# Patient Record
Sex: FEMALE | Race: WHITE | NOT HISPANIC OR LATINO | Employment: OTHER | URBAN - METROPOLITAN AREA
[De-identification: names, ages, dates, MRNs, and addresses within clinical notes are randomized per-mention and may not be internally consistent; named-entity substitution may affect disease eponyms.]

---

## 2017-02-13 ENCOUNTER — GENERIC CONVERSION - ENCOUNTER (OUTPATIENT)
Dept: OTHER | Facility: OTHER | Age: 47
End: 2017-02-13

## 2017-02-13 DIAGNOSIS — Z79.01 LONG TERM CURRENT USE OF ANTICOAGULANT: ICD-10-CM

## 2017-02-13 DIAGNOSIS — Z12.31 ENCOUNTER FOR SCREENING MAMMOGRAM FOR MALIGNANT NEOPLASM OF BREAST: ICD-10-CM

## 2017-02-13 LAB
AMBIG ABBREV CMP14 DEFAULT (HISTORICAL): NORMAL
AMBIG ABBREV LP DEFAULT (HISTORICAL): NORMAL

## 2017-02-14 LAB
A/G RATIO (HISTORICAL): 1.4 (ref 1.1–2.5)
ALBUMIN SERPL BCP-MCNC: 4.3 G/DL (ref 3.5–5.5)
ALP SERPL-CCNC: 73 IU/L (ref 39–117)
ALT SERPL W P-5'-P-CCNC: 23 IU/L (ref 0–32)
AST SERPL W P-5'-P-CCNC: 21 IU/L (ref 0–40)
BILIRUB SERPL-MCNC: <0.2 MG/DL (ref 0–1.2)
BUN SERPL-MCNC: 14 MG/DL (ref 6–24)
BUN/CREA RATIO (HISTORICAL): 20 (ref 9–23)
CALCIUM SERPL-MCNC: 9.4 MG/DL (ref 8.7–10.2)
CHLORIDE SERPL-SCNC: 103 MMOL/L (ref 96–106)
CHOLEST SERPL-MCNC: 223 MG/DL (ref 100–199)
CO2 SERPL-SCNC: 22 MMOL/L (ref 18–29)
CREAT SERPL-MCNC: 0.69 MG/DL (ref 0.57–1)
EGFR AFRICAN AMERICAN (HISTORICAL): 119 ML/MIN/1.73
EGFR-AMERICAN CALC (HISTORICAL): 103 ML/MIN/1.73
GLUCOSE SERPL-MCNC: 117 MG/DL (ref 65–99)
HDLC SERPL-MCNC: 46 MG/DL
LDLC SERPL CALC-MCNC: 116 MG/DL (ref 0–99)
POTASSIUM SERPL-SCNC: 4.4 MMOL/L (ref 3.5–5.2)
SODIUM SERPL-SCNC: 141 MMOL/L (ref 134–144)
TOT. GLOBULIN, SERUM (HISTORICAL): 3 G/DL (ref 1.5–4.5)
TOTAL PROTEIN (HISTORICAL): 7.3 G/DL (ref 6–8.5)
TRIGL SERPL-MCNC: 307 MG/DL (ref 0–149)
TSH SERPL DL<=0.05 MIU/L-ACNC: 2.61 UIU/ML (ref 0.45–4.5)
VLDLC SERPL CALC-MCNC: 61 MG/DL (ref 5–40)

## 2017-02-16 ENCOUNTER — ALLSCRIPTS OFFICE VISIT (OUTPATIENT)
Dept: OTHER | Facility: OTHER | Age: 47
End: 2017-02-16

## 2017-03-20 DIAGNOSIS — Z79.01 LONG TERM CURRENT USE OF ANTICOAGULANT: ICD-10-CM

## 2017-04-24 DIAGNOSIS — Z79.01 LONG TERM CURRENT USE OF ANTICOAGULANT: ICD-10-CM

## 2017-05-16 ENCOUNTER — HOSPITAL ENCOUNTER (OUTPATIENT)
Dept: MAMMOGRAPHY | Facility: CLINIC | Age: 47
Discharge: HOME | End: 2017-05-16
Payer: MEDICARE

## 2017-05-16 DIAGNOSIS — Z12.31 ENCOUNTER FOR SCREENING MAMMOGRAM FOR MALIGNANT NEOPLASM OF BREAST: ICD-10-CM

## 2017-05-16 PROCEDURE — G0202 SCR MAMMO BI INCL CAD: HCPCS

## 2017-05-16 PROCEDURE — 77063 BREAST TOMOSYNTHESIS BI: CPT

## 2017-05-29 DIAGNOSIS — Z79.01 LONG TERM CURRENT USE OF ANTICOAGULANT: ICD-10-CM

## 2017-06-19 DIAGNOSIS — Z12.31 ENCOUNTER FOR SCREENING MAMMOGRAM FOR MALIGNANT NEOPLASM OF BREAST: ICD-10-CM

## 2017-06-19 DIAGNOSIS — R73.01 IMPAIRED FASTING GLUCOSE: ICD-10-CM

## 2017-06-19 DIAGNOSIS — Z79.01 LONG TERM CURRENT USE OF ANTICOAGULANT: ICD-10-CM

## 2017-06-19 DIAGNOSIS — R19.00 INTRA-ABDOMINAL AND PELVIC SWELLING, MASS AND LUMP, UNSPECIFIED SITE: ICD-10-CM

## 2017-06-19 DIAGNOSIS — E78.5 HYPERLIPIDEMIA: ICD-10-CM

## 2017-07-07 ENCOUNTER — HOSPITAL ENCOUNTER (EMERGENCY)
Facility: HOSPITAL | Age: 47
Discharge: HOME | End: 2017-07-07
Attending: EMERGENCY MEDICINE | Admitting: EMERGENCY MEDICINE
Payer: MEDICARE

## 2017-07-07 ENCOUNTER — APPOINTMENT (EMERGENCY)
Dept: CT IMAGING | Facility: HOSPITAL | Age: 47
End: 2017-07-07
Payer: MEDICARE

## 2017-07-07 ENCOUNTER — ALLSCRIPTS OFFICE VISIT (OUTPATIENT)
Dept: OTHER | Facility: OTHER | Age: 47
End: 2017-07-07

## 2017-07-07 VITALS
BODY MASS INDEX: 40.75 KG/M2 | RESPIRATION RATE: 16 BRPM | SYSTOLIC BLOOD PRESSURE: 136 MMHG | WEIGHT: 230 LBS | HEIGHT: 63 IN | TEMPERATURE: 98.7 F | DIASTOLIC BLOOD PRESSURE: 65 MMHG | OXYGEN SATURATION: 97 % | HEART RATE: 94 BPM

## 2017-07-07 DIAGNOSIS — R10.9 NONSPECIFIC ABDOMINAL PAIN: Primary | ICD-10-CM

## 2017-07-07 DIAGNOSIS — N83.8 OVARIAN MASS, RIGHT: ICD-10-CM

## 2017-07-07 LAB
ANION GAP SERPL CALCULATED.3IONS-SCNC: 11 MMOL/L (ref 4–13)
BASOPHILS # BLD AUTO: 0.08 THOUSANDS/ΜL (ref 0–0.1)
BASOPHILS NFR BLD AUTO: 1 % (ref 0–1)
BILIRUB UR QL STRIP: NEGATIVE
BUN SERPL-MCNC: 10 MG/DL (ref 5–25)
CALCIUM SERPL-MCNC: 8.7 MG/DL (ref 8.3–10.1)
CHLORIDE SERPL-SCNC: 103 MMOL/L (ref 100–108)
CLARITY UR: CLEAR
CO2 SERPL-SCNC: 24 MMOL/L (ref 21–32)
COLOR UR: YELLOW
CREAT SERPL-MCNC: 0.68 MG/DL (ref 0.6–1.3)
EOSINOPHIL # BLD AUTO: 0.31 THOUSAND/ΜL (ref 0–0.61)
EOSINOPHIL NFR BLD AUTO: 3 % (ref 0–6)
ERYTHROCYTE [DISTWIDTH] IN BLOOD BY AUTOMATED COUNT: 17.5 % (ref 11.6–15.1)
GFR SERPL CREATININE-BSD FRML MDRD: >60 ML/MIN/1.73SQ M
GLUCOSE SERPL-MCNC: 92 MG/DL (ref 65–140)
GLUCOSE UR STRIP-MCNC: NEGATIVE MG/DL
HCG SERPL QL: NEGATIVE
HCT VFR BLD AUTO: 36.4 % (ref 34.8–46.1)
HGB BLD-MCNC: 11 G/DL (ref 11.5–15.4)
HGB UR QL STRIP.AUTO: NEGATIVE
KETONES UR STRIP-MCNC: NEGATIVE MG/DL
LEUKOCYTE ESTERASE UR QL STRIP: NEGATIVE
LYMPHOCYTES # BLD AUTO: 2.03 THOUSANDS/ΜL (ref 0.6–4.47)
LYMPHOCYTES NFR BLD AUTO: 21 % (ref 14–44)
MCH RBC QN AUTO: 23.5 PG (ref 26.8–34.3)
MCHC RBC AUTO-ENTMCNC: 30.2 G/DL (ref 31.4–37.4)
MCV RBC AUTO: 78 FL (ref 82–98)
MONOCYTES # BLD AUTO: 0.8 THOUSAND/ΜL (ref 0.17–1.22)
MONOCYTES NFR BLD AUTO: 8 % (ref 4–12)
NEUTROPHILS # BLD AUTO: 6.35 THOUSANDS/ΜL (ref 1.85–7.62)
NEUTS SEG NFR BLD AUTO: 66 % (ref 43–75)
NITRITE UR QL STRIP: NEGATIVE
NRBC BLD AUTO-RTO: 0 /100 WBCS
PH UR STRIP.AUTO: 7 [PH] (ref 4.5–8)
PLATELET # BLD AUTO: 242 THOUSANDS/UL (ref 149–390)
PMV BLD AUTO: 11.5 FL (ref 8.9–12.7)
POTASSIUM SERPL-SCNC: 3.6 MMOL/L (ref 3.5–5.3)
PROT UR STRIP-MCNC: NEGATIVE MG/DL
RBC # BLD AUTO: 4.68 MILLION/UL (ref 3.81–5.12)
SODIUM SERPL-SCNC: 138 MMOL/L (ref 136–145)
SP GR UR STRIP.AUTO: 1.01 (ref 1–1.03)
UROBILINOGEN UR QL STRIP.AUTO: 0.2 E.U./DL
WBC # BLD AUTO: 9.61 THOUSAND/UL (ref 4.31–10.16)

## 2017-07-07 PROCEDURE — 36415 COLL VENOUS BLD VENIPUNCTURE: CPT | Performed by: EMERGENCY MEDICINE

## 2017-07-07 PROCEDURE — 84703 CHORIONIC GONADOTROPIN ASSAY: CPT | Performed by: EMERGENCY MEDICINE

## 2017-07-07 PROCEDURE — 80048 BASIC METABOLIC PNL TOTAL CA: CPT | Performed by: EMERGENCY MEDICINE

## 2017-07-07 PROCEDURE — 74177 CT ABD & PELVIS W/CONTRAST: CPT

## 2017-07-07 PROCEDURE — 96360 HYDRATION IV INFUSION INIT: CPT

## 2017-07-07 PROCEDURE — 99284 EMERGENCY DEPT VISIT MOD MDM: CPT

## 2017-07-07 PROCEDURE — 96361 HYDRATE IV INFUSION ADD-ON: CPT

## 2017-07-07 PROCEDURE — 85025 COMPLETE CBC W/AUTO DIFF WBC: CPT | Performed by: EMERGENCY MEDICINE

## 2017-07-07 PROCEDURE — 81003 URINALYSIS AUTO W/O SCOPE: CPT | Performed by: EMERGENCY MEDICINE

## 2017-07-07 RX ADMIN — IOHEXOL 100 ML: 350 INJECTION, SOLUTION INTRAVENOUS at 18:24

## 2017-07-07 RX ADMIN — SODIUM CHLORIDE 1000 ML: 0.9 INJECTION, SOLUTION INTRAVENOUS at 16:18

## 2017-07-14 ENCOUNTER — HOSPITAL ENCOUNTER (OUTPATIENT)
Dept: ULTRASOUND IMAGING | Facility: CLINIC | Age: 47
Discharge: HOME | End: 2017-07-14
Payer: MEDICARE

## 2017-07-14 DIAGNOSIS — R19.00 INTRA-ABDOMINAL AND PELVIC SWELLING, MASS AND LUMP, UNSPECIFIED SITE: ICD-10-CM

## 2017-07-14 PROCEDURE — 76856 US EXAM PELVIC COMPLETE: CPT

## 2017-07-14 PROCEDURE — 76830 TRANSVAGINAL US NON-OB: CPT

## 2017-07-18 ENCOUNTER — ALLSCRIPTS OFFICE VISIT (OUTPATIENT)
Dept: OTHER | Facility: OTHER | Age: 47
End: 2017-07-18

## 2017-07-28 ENCOUNTER — ALLSCRIPTS OFFICE VISIT (OUTPATIENT)
Dept: OTHER | Facility: OTHER | Age: 47
End: 2017-07-28

## 2017-07-28 DIAGNOSIS — N83.9 NONINFLAMMATORY DISORDER OF OVARY, FALLOPIAN TUBE, AND BROAD LIGAMENT: ICD-10-CM

## 2017-08-29 ENCOUNTER — GENERIC CONVERSION - ENCOUNTER (OUTPATIENT)
Dept: OTHER | Facility: OTHER | Age: 47
End: 2017-08-29

## 2017-09-11 DIAGNOSIS — N83.201 CYST OF RIGHT OVARY: ICD-10-CM

## 2017-09-11 DIAGNOSIS — Z01.818 ENCOUNTER FOR OTHER PREPROCEDURAL EXAMINATION: ICD-10-CM

## 2017-09-12 ENCOUNTER — HOSPITAL ENCOUNTER (OUTPATIENT)
Dept: ULTRASOUND IMAGING | Facility: CLINIC | Age: 47
Discharge: HOME | End: 2017-09-12
Payer: MEDICARE

## 2017-09-12 DIAGNOSIS — N83.201 CYST OF RIGHT OVARY: ICD-10-CM

## 2017-09-12 PROCEDURE — 76856 US EXAM PELVIC COMPLETE: CPT

## 2017-09-12 PROCEDURE — 76830 TRANSVAGINAL US NON-OB: CPT

## 2017-09-15 ENCOUNTER — GENERIC CONVERSION - ENCOUNTER (OUTPATIENT)
Dept: OTHER | Facility: OTHER | Age: 47
End: 2017-09-15

## 2017-09-18 ENCOUNTER — GENERIC CONVERSION - ENCOUNTER (OUTPATIENT)
Dept: OTHER | Facility: OTHER | Age: 47
End: 2017-09-18

## 2017-09-20 ENCOUNTER — ALLSCRIPTS OFFICE VISIT (OUTPATIENT)
Dept: OTHER | Facility: OTHER | Age: 47
End: 2017-09-20

## 2017-10-30 ENCOUNTER — ALLSCRIPTS OFFICE VISIT (OUTPATIENT)
Dept: OTHER | Facility: OTHER | Age: 47
End: 2017-10-30

## 2017-10-30 ENCOUNTER — TRANSCRIBE ORDERS (OUTPATIENT)
Dept: LAB | Facility: CLINIC | Age: 47
End: 2017-10-30

## 2017-10-30 ENCOUNTER — APPOINTMENT (OUTPATIENT)
Dept: LAB | Facility: CLINIC | Age: 47
End: 2017-10-30
Payer: MEDICARE

## 2017-10-30 DIAGNOSIS — R73.01 IMPAIRED FASTING GLUCOSE: ICD-10-CM

## 2017-10-30 DIAGNOSIS — Z01.818 PREOP EXAMINATION: Primary | ICD-10-CM

## 2017-10-30 DIAGNOSIS — Z01.818 PREOP EXAMINATION: ICD-10-CM

## 2017-10-30 DIAGNOSIS — Z01.818 ENCOUNTER FOR OTHER PREPROCEDURAL EXAMINATION: ICD-10-CM

## 2017-10-30 DIAGNOSIS — E78.5 HYPERLIPIDEMIA: ICD-10-CM

## 2017-10-30 LAB
APTT PPP: 32 SECONDS (ref 23–35)
CHOLEST SERPL-MCNC: 256 MG/DL (ref 50–200)
ERYTHROCYTE [DISTWIDTH] IN BLOOD BY AUTOMATED COUNT: 16.8 % (ref 11.6–15.1)
EST. AVERAGE GLUCOSE BLD GHB EST-MCNC: 126 MG/DL
HBA1C MFR BLD: 6 % (ref 4.2–6.3)
HCT VFR BLD AUTO: 34.6 % (ref 34.8–46.1)
HDLC SERPL-MCNC: 40 MG/DL (ref 40–60)
HGB BLD-MCNC: 10.6 G/DL (ref 11.5–15.4)
INR PPP: 1.26 (ref 0.86–1.16)
LDLC SERPL CALC-MCNC: 156 MG/DL (ref 0–100)
MCH RBC QN AUTO: 22.9 PG (ref 26.8–34.3)
MCHC RBC AUTO-ENTMCNC: 30.6 G/DL (ref 31.4–37.4)
MCV RBC AUTO: 75 FL (ref 82–98)
PLATELET # BLD AUTO: 240 THOUSANDS/UL (ref 149–390)
PMV BLD AUTO: 11.3 FL (ref 8.9–12.7)
PROTHROMBIN TIME: 15.9 SECONDS (ref 12.1–14.4)
RBC # BLD AUTO: 4.62 MILLION/UL (ref 3.81–5.12)
TRIGL SERPL-MCNC: 298 MG/DL
WBC # BLD AUTO: 5.6 THOUSAND/UL (ref 4.31–10.16)

## 2017-10-30 PROCEDURE — 85730 THROMBOPLASTIN TIME PARTIAL: CPT

## 2017-10-30 PROCEDURE — 85027 COMPLETE CBC AUTOMATED: CPT

## 2017-10-30 PROCEDURE — 85610 PROTHROMBIN TIME: CPT

## 2017-10-30 PROCEDURE — 36415 COLL VENOUS BLD VENIPUNCTURE: CPT

## 2017-10-30 PROCEDURE — 83036 HEMOGLOBIN GLYCOSYLATED A1C: CPT

## 2017-10-30 PROCEDURE — 80061 LIPID PANEL: CPT

## 2017-10-31 ENCOUNTER — APPOINTMENT (OUTPATIENT)
Dept: LAB | Facility: HOSPITAL | Age: 47
End: 2017-10-31
Attending: OBSTETRICS & GYNECOLOGY
Payer: COMMERCIAL

## 2017-10-31 ENCOUNTER — TRANSCRIBE ORDERS (OUTPATIENT)
Dept: ADMINISTRATIVE | Facility: HOSPITAL | Age: 47
End: 2017-10-31

## 2017-10-31 ENCOUNTER — GENERIC CONVERSION - ENCOUNTER (OUTPATIENT)
Dept: OTHER | Facility: OTHER | Age: 47
End: 2017-10-31

## 2017-10-31 DIAGNOSIS — Z01.818 PREOP EXAMINATION: Primary | ICD-10-CM

## 2017-10-31 DIAGNOSIS — Z01.818 PREOP EXAMINATION: ICD-10-CM

## 2017-10-31 LAB
ABO GROUP BLD: NORMAL
BLD GP AB SCN SERPL QL: NEGATIVE
RH BLD: POSITIVE
SPECIMEN EXPIRATION DATE: NORMAL

## 2017-10-31 PROCEDURE — 86901 BLOOD TYPING SEROLOGIC RH(D): CPT

## 2017-10-31 PROCEDURE — 86850 RBC ANTIBODY SCREEN: CPT

## 2017-10-31 PROCEDURE — 86900 BLOOD TYPING SEROLOGIC ABO: CPT

## 2017-10-31 PROCEDURE — 36415 COLL VENOUS BLD VENIPUNCTURE: CPT

## 2017-10-31 RX ORDER — OMEPRAZOLE 20 MG/1
20 CAPSULE, DELAYED RELEASE ORAL DAILY
COMMUNITY
End: 2020-03-02

## 2017-10-31 RX ORDER — ROSUVASTATIN CALCIUM 10 MG/1
10 TABLET, COATED ORAL DAILY
COMMUNITY
End: 2018-04-04 | Stop reason: SDUPTHER

## 2017-11-01 NOTE — PROGRESS NOTES
Assessment  1  Impaired fasting glucose (790 21) (R73 01)   2  Hyperlipidemia (272 4) (E78 5)   3  Animal bites (879 8,E906 5) (T14 8XXA)   4  Preoperative clearance (V72 84) (Z01 818)    Plan   Animal bites    · Tdap    (1) HEMOGLOBIN A1C; Status:Resulted - Requires Verification;   Done: 97UML5392 12:00AM  Due:30Oct2018; Ordered;    For:Hyperlipidemia, Impaired fasting glucose; Ordered By:Dana Hand;   (1) LIPID PANEL, FASTING; Status:Resulted - Requires Verification;   Done: 39FAE7755 12:00AM  Due:30Oct2018; Ordered;    For:Hyperlipidemia, Impaired fasting glucose; Ordered By:Sha Hand; Discussion/Summary  Surgical Clearance: She is at a LOW TO MODERATE risk from a cardiovascular standpoint at this time without any additional cardiac testing  Reevaluation needed, if she should present with symptoms prior to surgery/procedure  Surgical clearance faxed to Dr Tip Vivar   No medical contraindications to proceed with surgery  She will hold her Xarelto 48 hours prior to surgery and she can resume when okayed by surgery  Chief Complaint  preop      History of Present Illness  Pre-Op Visit (Brief): The patient is being seen for a preoperative visit-- and-- oopherectomy/salpingo  Surgical Risk Assessment:   Prior Anesthesia: She had prior anesthesia,-- no prior adverse reaction to edidural anesthesia,-- no prior adverse reaction to spinal anesthesia-- and-- no prior adverse reaction to general anesthesia  Pertinent Past Medical History: no pertinent past medical history  Exercise Capacity: able to walk four blocks without symptoms-- and-- able to walk two flights of stairs without symptoms  Lifestyle Factors: denies tobacco use  Symptoms: no symptoms  HPI: bit by pig right lower ext, red but healing not sure if she needs tdap      Review of Systems    Constitutional: No fever, no chills, feels well, no tiredness, no recent weight gain or weight loss     Eyes: No complaints of eye pain, no red eyes, no eyesight problems, no discharge, no dry eyes, no itching of eyes  ENT: no complaints of earache, no loss of hearing, no nose bleeds, no nasal discharge, no sore throat, no hoarseness  Cardiovascular: No complaints of slow heart rate, no fast heart rate, no chest pain, no palpitations, no leg claudication, no lower extremity edema  Respiratory: No complaints of shortness of breath, no wheezing, no cough, no SOB on exertion, no orthopnea, no PND  Gastrointestinal: No complaints of abdominal pain, no constipation, no nausea or vomiting, no diarrhea, no bloody stools  Genitourinary: No complaints of dysuria, no incontinence, no pelvic pain, no dysmenorrhea, no vaginal discharge or bleeding  Musculoskeletal: No complaints of arthralgias, no myalgias, no joint swelling or stiffness, no limb pain or swelling  Integumentary: as noted in HPI  Neurological: No complaints of headache, no confusion, no convulsions, no numbness, no dizziness or fainting, no tingling, no limb weakness, no difficulty walking  Psychiatric: Not suicidal, no sleep disturbance, no anxiety or depression, no change in personality, no emotional problems  Endocrine: No complaints of proptosis, no hot flashes, no muscle weakness, no deepening of the voice, no feelings of weakness  Hematologic/Lymphatic: No complaints of swollen glands, no swollen glands in the neck, does not bleed easily, does not bruise easily  Active Problems  1  Abdomen enlarged (789 30) (R19 8)   2  Abdominal pain, acute, right lower quadrant (789 03,338 19) (R10 31)   3  Actinic keratosis (702 0) (L57 0)   4  Amputation of leg (V49 70) (Z89 619)   5  Anticoagulant long-term use (V58 61) (Z79 01)   6  Autoimmune disease (279 49) (M35 9)   7  Changing skin lesion (709 9) (L98 9)   8  Cyst of ovary, right (620 2) (N83 201)   9  Depression (311) (F32 9)   10  Dermatitis (692 9) (L30 9)   11  Elevated liver enzymes (790 5) (R74 8)   12   Exertional angina (413 9) (I20 8)   13  Hyperlipidemia (272 4) (E78 5)   14  Impaired fasting glucose (790 21) (R73 01)   15  Inflammatory spondylopathy of lumbosacral region (720 9) (M46 97)   16  Microcytosis (790 09) (R71 8)   17  Multiple benign nevi (216 9) (D22 9)   18  Ovarian mass (620 9) (N83 9)   19  Pelvic mass (789 30) (R19 00)   20  Pre-op testing (V72 84) (Z01 818)   21  Pulmonary embolism (415 19) (I26 99)   22  RSD (reflex sympathetic dystrophy) (337 20) (G90 50)   23  Skin neoplasm (239 2) (D49 2)   24  Urine retention (788 20) (R33 9)   25  Vertigo (780 4) (R42)    Past Medical History   · H/O nonmelanoma skin cancer (V10 83) (Y42 340)   · History of arthritis (V13 4) (Z87 39)   · History of squamous cell carcinoma (V10 89) (Z85 89)   · History of urinary incontinence (V13 09) (Q00 527)   · History of uterine leiomyoma (V13 29) (Z86 018)   · History of Other screening mammogram (V76 12) (Z12 31)    The active problems and past medical history were reviewed and updated today  Surgical History   · History of Amputation Of Leg Below Knee   · History of Ankle Surgery   · History of  Section   · History of Hysterectomy   · History of Spinal Stereotaxis Stimulation Of Cord    The surgical history was reviewed and updated today  Family History  Mother    · Family history of Coronary artery disease due to calcified coronary lesion   · Family history of hypertension (V17 49) (Z82 49)  Father    · Family history of Type 2 diabetes, controlled, with neuropathy  Daughter    · Family history of Vitiligo  Brother    · Family history of Vitiligo  Maternal Cousin    · Family history of malignant neoplasm of breast (V16 3) (Z80 3)  Paternal Cousin    · Family history of malignant neoplasm of breast (V16 3) (Z80 3)  Multiple Family Members    · Family history of malignant neoplasm of breast (V16 3) (Z80 3)    The family history was reviewed and updated today         Social History   · Currently sexually active   ·    · Never smoker   · Occasional alcohol use   · Retired  The social history was reviewed and updated today  Current Meds   1  B Complex Plus Oral Tablet Recorded   2  Desonide 0 05 % External Cream; apply sparingly to face bid  1 week; Therapy: 89Qmf3465 to (Last Rx:33Dlg1287)  Requested for: 10VFO1807 Ordered   3  Folic Acid 1 MG Oral Tablet; 2 tabs daily; Last Rx:78Pos4294 Ordered   4  Glucosamine Chondro Complex TABS; Therapy: (Recorded:61Iry1037) to Recorded   5  LORazepam 0 5 MG Oral Tablet; take 1-2 tabs q 8 hours as needed; Last Rx:19Fzs9923   Ordered   6  Methotrexate 2 5 MG Oral Tablet; 6 q Tuesday; Last Rx:29Ofc0501 Ordered   7  Omeprazole 20 MG Oral Capsule Delayed Release; take bid  Requested for:   54Udc9342; Last Rx:33Wgk0220 Ordered   8  Probiotic Oral Capsule Recorded   9  Rosuvastatin Calcium 10 MG Oral Tablet; take 1 tablet by mouth every day; Therapy: 88BTC6195 to (03 17 74 30 53)  Requested for: 54IMB9300; Last   Rx:94Ssm8165 Ordered   10  Sertraline HCl - 100 MG Oral Tablet; Take 1 tablet daily; Therapy: 75TKI1743 to (Aguilar Chew)  Requested for: 57PEF3107; Last    Rx:21Ukp8390 Ordered   11  SulfaSALAzine 500 MG Oral Tablet; TAKE 6 TABLET Daily; Therapy: (Recorded:53Eyj1880) to Recorded   12  TraMADol HCl - 50 MG Oral Tablet; take one tablet by mouth every six hours as needed; Last Rx:82Xsv7782 Ordered   13  Vitamin D 1000 UNIT Oral Tablet Recorded   14  Xarelto 20 MG Oral Tablet; take one tablet by mouth every day; Therapy: 33Aap0073 to (Evaluate:35Oje2551)  Requested for: 15Asg3186; Last    Rx:03Mkl7386 Ordered    The medication list was reviewed and updated today  Allergies  1  Ciprofloxacin HCl TABS   2  Codeine Sulfate TABS   3  Morphine Sulfate TABS   4   Penicillins    Vitals   Recorded: 96HEA3059 09:01AM   Heart Rate 84   Respiration 14   Systolic 626   Diastolic 88   Height 5 ft 4 in   Weight 229 lb 2 oz   BMI Calculated 39 33 BSA Calculated 2 07     Physical Exam    Constitutional   General appearance: No acute distress, well appearing and well nourished  Head and Face   Head and face: Normal     Eyes   Conjunctiva and lids: No swelling, erythema or discharge  Neck   Neck: Supple, symmetric, trachea midline, no masses  Pulmonary   Auscultation of lungs: Clear to auscultation  Cardiovascular   Auscultation of heart: Normal rate and rhythm, normal S1 and S2, no murmurs  Carotid pulses: 2+ bilaterally  Examination of extremities for edema and/or varicosities: Normal     Abdomen   Abdomen: Non-tender, no masses  Lymphatic   Palpation of lymph nodes in neck: No lymphadenopathy  Musculoskeletal   Gait and station: Normal     Skin   Examination of the skin for lesions: Abnormal  -- small erthythematous abrasions to skin  Education  Education Items with no Session   Tdap;  Provided: 51HXP7880 10:07AM; Counselor: Sheyla Borges; End of Encounter Meds  1  Sertraline HCl - 100 MG Oral Tablet; Take 1 tablet daily; Therapy: 40RLK1716 to [phone])  Requested for: 92ZEM4139; Last   Rx:30Mar2017 Ordered  2  Desonide 0 05 % External Cream; apply sparingly to face bid  1 week; Therapy: 27Ogq5407 to (Last Rx:75Mps8274)  Requested for: 45QVL4747 Ordered  3  Folic Acid 1 MG Oral Tablet; 2 tabs daily; Last Rx:49Ywo8506 Ordered   4  LORazepam 0 5 MG Oral Tablet; take 1-2 tabs q 8 hours as needed; Last Rx:71Ncu5519   Ordered   5  Omeprazole 20 MG Oral Capsule Delayed Release; take bid  Requested for:   22Puq8338; Last Rx:30Jzw7049 Ordered   6  TraMADol HCl - 50 MG Oral Tablet; take one tablet by mouth every six hours as needed; Last Rx:71Sgv8267 Ordered  7  Rosuvastatin Calcium 10 MG Oral Tablet (Crestor); take 1 tablet by mouth every day; Therapy: 01LHI7367 to (03 17 74 30 53)  Requested for: 62BXQ6532; Last   Rx:38Gvb1494 Ordered  8  Methotrexate 2 5 MG Oral Tablet; 6 q Tuesday;  Last Rx:46Qpe5485 Ordered  9  Xarelto 20 MG Oral Tablet; take one tablet by mouth every day; Therapy: 03Mxu7571 to (Evaluate:25Jxu3473)  Requested for: 58Tth9059; Last   Rx:79Btb1991 Ordered  10  B Complex Plus Oral Tablet Recorded   11  Glucosamine Chondro Complex TABS; Therapy: (Recorded:71Vyv6342) to Recorded   12  Probiotic Oral Capsule Recorded   13  SulfaSALAzine 500 MG Oral Tablet; TAKE 6 TABLET Daily; Therapy: (Recorded:27Wuv8610) to Recorded   14   Vitamin D 1000 UNIT Oral Tablet Recorded    Future Appointments    Date/Time Provider Specialty Site   11/24/2017 01:30 PM Deb Landaverde [facility]     Signatures   Electronically signed by : Deb Koehler; Oct 31 2017  9:50AM EST                       (Author)    Electronically signed by : MAURO Hernandez ; Oct 31 2017  6:18PM EST

## 2017-11-09 ENCOUNTER — ANESTHESIA EVENT (OUTPATIENT)
Dept: PERIOP | Facility: HOSPITAL | Age: 47
End: 2017-11-09
Payer: MEDICARE

## 2017-11-10 ENCOUNTER — ANESTHESIA (OUTPATIENT)
Dept: PERIOP | Facility: HOSPITAL | Age: 47
End: 2017-11-10
Payer: MEDICARE

## 2017-11-10 ENCOUNTER — HOSPITAL ENCOUNTER (OUTPATIENT)
Facility: HOSPITAL | Age: 47
Setting detail: OUTPATIENT SURGERY
Discharge: HOME | End: 2017-11-10
Attending: OBSTETRICS & GYNECOLOGY | Admitting: OBSTETRICS & GYNECOLOGY
Payer: MEDICARE

## 2017-11-10 VITALS
SYSTOLIC BLOOD PRESSURE: 123 MMHG | DIASTOLIC BLOOD PRESSURE: 57 MMHG | BODY MASS INDEX: 40.62 KG/M2 | RESPIRATION RATE: 14 BRPM | HEART RATE: 84 BPM | HEIGHT: 63 IN | TEMPERATURE: 98 F | OXYGEN SATURATION: 95 % | WEIGHT: 229.28 LBS

## 2017-11-10 DIAGNOSIS — R10.2 PELVIC AND PERINEAL PAIN: ICD-10-CM

## 2017-11-10 DIAGNOSIS — N83.201 RIGHT OVARIAN CYST: ICD-10-CM

## 2017-11-10 PROCEDURE — 88305 TISSUE EXAM BY PATHOLOGIST: CPT | Performed by: OBSTETRICS & GYNECOLOGY

## 2017-11-10 RX ORDER — SODIUM CHLORIDE, SODIUM LACTATE, POTASSIUM CHLORIDE, CALCIUM CHLORIDE 600; 310; 30; 20 MG/100ML; MG/100ML; MG/100ML; MG/100ML
125 INJECTION, SOLUTION INTRAVENOUS
Status: COMPLETED | OUTPATIENT
Start: 2017-11-10 | End: 2017-11-10

## 2017-11-10 RX ORDER — LIDOCAINE HYDROCHLORIDE 10 MG/ML
INJECTION, SOLUTION INFILTRATION; PERINEURAL AS NEEDED
Status: DISCONTINUED | OUTPATIENT
Start: 2017-11-10 | End: 2017-11-10 | Stop reason: SURG

## 2017-11-10 RX ORDER — KETOROLAC TROMETHAMINE 30 MG/ML
INJECTION, SOLUTION INTRAMUSCULAR; INTRAVENOUS AS NEEDED
Status: DISCONTINUED | OUTPATIENT
Start: 2017-11-10 | End: 2017-11-10 | Stop reason: SURG

## 2017-11-10 RX ORDER — ROCURONIUM BROMIDE 10 MG/ML
INJECTION, SOLUTION INTRAVENOUS AS NEEDED
Status: DISCONTINUED | OUTPATIENT
Start: 2017-11-10 | End: 2017-11-10 | Stop reason: SURG

## 2017-11-10 RX ORDER — MAGNESIUM HYDROXIDE 1200 MG/15ML
LIQUID ORAL AS NEEDED
Status: DISCONTINUED | OUTPATIENT
Start: 2017-11-10 | End: 2017-11-10 | Stop reason: HOSPADM

## 2017-11-10 RX ORDER — SODIUM CHLORIDE 9 MG/ML
INJECTION, SOLUTION INTRAVENOUS CONTINUOUS PRN
Status: DISCONTINUED | OUTPATIENT
Start: 2017-11-10 | End: 2017-11-10 | Stop reason: SURG

## 2017-11-10 RX ORDER — LIDOCAINE HYDROCHLORIDE AND EPINEPHRINE 10; 10 MG/ML; UG/ML
INJECTION, SOLUTION INFILTRATION; PERINEURAL AS NEEDED
Status: DISCONTINUED | OUTPATIENT
Start: 2017-11-10 | End: 2017-11-10 | Stop reason: HOSPADM

## 2017-11-10 RX ORDER — MIDAZOLAM HYDROCHLORIDE 1 MG/ML
INJECTION INTRAMUSCULAR; INTRAVENOUS AS NEEDED
Status: DISCONTINUED | OUTPATIENT
Start: 2017-11-10 | End: 2017-11-10 | Stop reason: SURG

## 2017-11-10 RX ORDER — GLYCOPYRROLATE 0.2 MG/ML
INJECTION INTRAMUSCULAR; INTRAVENOUS AS NEEDED
Status: DISCONTINUED | OUTPATIENT
Start: 2017-11-10 | End: 2017-11-10 | Stop reason: SURG

## 2017-11-10 RX ORDER — OXYCODONE HYDROCHLORIDE AND ACETAMINOPHEN 5; 325 MG/1; MG/1
1 TABLET ORAL EVERY 4 HOURS PRN
Status: DISCONTINUED | OUTPATIENT
Start: 2017-11-10 | End: 2017-11-10 | Stop reason: HOSPADM

## 2017-11-10 RX ORDER — MAG HYDROX/ALUMINUM HYD/SIMETH 400-400-40
SUSPENSION, ORAL (FINAL DOSE FORM) ORAL
COMMUNITY

## 2017-11-10 RX ORDER — FENTANYL CITRATE 50 UG/ML
INJECTION, SOLUTION INTRAMUSCULAR; INTRAVENOUS AS NEEDED
Status: DISCONTINUED | OUTPATIENT
Start: 2017-11-10 | End: 2017-11-10 | Stop reason: SURG

## 2017-11-10 RX ORDER — EPHEDRINE SULFATE 50 MG/ML
INJECTION, SOLUTION INTRAVENOUS AS NEEDED
Status: DISCONTINUED | OUTPATIENT
Start: 2017-11-10 | End: 2017-11-10

## 2017-11-10 RX ORDER — SERTRALINE HYDROCHLORIDE 100 MG/1
50 TABLET, FILM COATED ORAL DAILY
COMMUNITY
End: 2018-04-11 | Stop reason: SDUPTHER

## 2017-11-10 RX ORDER — ONDANSETRON 2 MG/ML
INJECTION INTRAMUSCULAR; INTRAVENOUS AS NEEDED
Status: DISCONTINUED | OUTPATIENT
Start: 2017-11-10 | End: 2017-11-10 | Stop reason: SURG

## 2017-11-10 RX ORDER — OXYCODONE HYDROCHLORIDE AND ACETAMINOPHEN 5; 325 MG/1; MG/1
1 TABLET ORAL EVERY 4 HOURS PRN
Qty: 30 TABLET | Refills: 0 | Status: SHIPPED | OUTPATIENT
Start: 2017-11-10 | End: 2017-11-20

## 2017-11-10 RX ORDER — VITAMIN B COMPLEX
1 CAPSULE ORAL DAILY
COMMUNITY

## 2017-11-10 RX ORDER — PROPOFOL 10 MG/ML
INJECTION, EMULSION INTRAVENOUS CONTINUOUS PRN
Status: DISCONTINUED | OUTPATIENT
Start: 2017-11-10 | End: 2017-11-10 | Stop reason: SURG

## 2017-11-10 RX ORDER — DIPHENHYDRAMINE HYDROCHLORIDE 50 MG/ML
12.5 INJECTION INTRAMUSCULAR; INTRAVENOUS ONCE AS NEEDED
Status: DISCONTINUED | OUTPATIENT
Start: 2017-11-10 | End: 2017-11-10 | Stop reason: HOSPADM

## 2017-11-10 RX ORDER — METOCLOPRAMIDE HYDROCHLORIDE 5 MG/ML
INJECTION INTRAMUSCULAR; INTRAVENOUS AS NEEDED
Status: DISCONTINUED | OUTPATIENT
Start: 2017-11-10 | End: 2017-11-10 | Stop reason: SURG

## 2017-11-10 RX ORDER — SUCCINYLCHOLINE CHLORIDE 20 MG/ML
INJECTION INTRAMUSCULAR; INTRAVENOUS AS NEEDED
Status: DISCONTINUED | OUTPATIENT
Start: 2017-11-10 | End: 2017-11-10 | Stop reason: SURG

## 2017-11-10 RX ORDER — FENTANYL CITRATE/PF 50 MCG/ML
50 SYRINGE (ML) INJECTION
Status: COMPLETED | OUTPATIENT
Start: 2017-11-10 | End: 2017-11-10

## 2017-11-10 RX ORDER — ONDANSETRON 2 MG/ML
4 INJECTION INTRAMUSCULAR; INTRAVENOUS ONCE AS NEEDED
Status: DISCONTINUED | OUTPATIENT
Start: 2017-11-10 | End: 2017-11-10 | Stop reason: HOSPADM

## 2017-11-10 RX ORDER — SCOLOPAMINE TRANSDERMAL SYSTEM 1 MG/1
1 PATCH, EXTENDED RELEASE TRANSDERMAL ONCE
Status: DISCONTINUED | OUTPATIENT
Start: 2017-11-10 | End: 2017-11-10 | Stop reason: HOSPADM

## 2017-11-10 RX ORDER — LANOLIN ALCOHOL/MO/W.PET/CERES
1 CREAM (GRAM) TOPICAL 3 TIMES DAILY
COMMUNITY

## 2017-11-10 RX ORDER — SODIUM CHLORIDE, SODIUM LACTATE, POTASSIUM CHLORIDE, CALCIUM CHLORIDE 600; 310; 30; 20 MG/100ML; MG/100ML; MG/100ML; MG/100ML
INJECTION, SOLUTION INTRAVENOUS CONTINUOUS PRN
Status: DISCONTINUED | OUTPATIENT
Start: 2017-11-10 | End: 2017-11-10 | Stop reason: SURG

## 2017-11-10 RX ORDER — PROPOFOL 10 MG/ML
INJECTION, EMULSION INTRAVENOUS AS NEEDED
Status: DISCONTINUED | OUTPATIENT
Start: 2017-11-10 | End: 2017-11-10 | Stop reason: SURG

## 2017-11-10 RX ORDER — SULFASALAZINE 500 MG/1
500 TABLET ORAL 2 TIMES DAILY
COMMUNITY
End: 2022-02-14 | Stop reason: CLARIF

## 2017-11-10 RX ORDER — KETAMINE HYDROCHLORIDE 50 MG/ML
INJECTION, SOLUTION, CONCENTRATE INTRAMUSCULAR; INTRAVENOUS AS NEEDED
Status: DISCONTINUED | OUTPATIENT
Start: 2017-11-10 | End: 2017-11-10 | Stop reason: SURG

## 2017-11-10 RX ADMIN — ROCURONIUM BROMIDE 50 MG: 10 INJECTION INTRAVENOUS at 11:05

## 2017-11-10 RX ADMIN — HYDROMORPHONE HYDROCHLORIDE 0.4 MG: 1 INJECTION, SOLUTION INTRAMUSCULAR; INTRAVENOUS; SUBCUTANEOUS at 13:38

## 2017-11-10 RX ADMIN — HYDROMORPHONE HYDROCHLORIDE 0.4 MG: 1 INJECTION, SOLUTION INTRAMUSCULAR; INTRAVENOUS; SUBCUTANEOUS at 13:04

## 2017-11-10 RX ADMIN — OXYCODONE HYDROCHLORIDE AND ACETAMINOPHEN 1 TABLET: 5; 325 TABLET ORAL at 13:59

## 2017-11-10 RX ADMIN — SODIUM CHLORIDE, SODIUM LACTATE, POTASSIUM CHLORIDE, AND CALCIUM CHLORIDE: .6; .31; .03; .02 INJECTION, SOLUTION INTRAVENOUS at 10:09

## 2017-11-10 RX ADMIN — SUCCINYLCHOLINE CHLORIDE 160 MG: 20 INJECTION, SOLUTION INTRAMUSCULAR; INTRAVENOUS at 10:56

## 2017-11-10 RX ADMIN — SODIUM CHLORIDE, SODIUM LACTATE, POTASSIUM CHLORIDE, AND CALCIUM CHLORIDE 125 ML/HR: .6; .31; .03; .02 INJECTION, SOLUTION INTRAVENOUS at 10:10

## 2017-11-10 RX ADMIN — KETOROLAC TROMETHAMINE 30 MG: 30 INJECTION, SOLUTION INTRAMUSCULAR at 11:59

## 2017-11-10 RX ADMIN — GLYCOPYRROLATE 0.6 MG: 0.2 INJECTION, SOLUTION INTRAMUSCULAR; INTRAVENOUS at 12:22

## 2017-11-10 RX ADMIN — REMIFENTANIL HYDROCHLORIDE 0.15 MCG/KG/MIN: 1 INJECTION, POWDER, LYOPHILIZED, FOR SOLUTION INTRAVENOUS at 10:59

## 2017-11-10 RX ADMIN — DEXAMETHASONE SODIUM PHOSPHATE 4 MG: 10 INJECTION INTRAMUSCULAR; INTRAVENOUS at 11:03

## 2017-11-10 RX ADMIN — FENTANYL CITRATE 50 MCG: 50 INJECTION INTRAMUSCULAR; INTRAVENOUS at 12:55

## 2017-11-10 RX ADMIN — HYDROMORPHONE HYDROCHLORIDE 0.4 MG: 1 INJECTION, SOLUTION INTRAMUSCULAR; INTRAVENOUS; SUBCUTANEOUS at 13:19

## 2017-11-10 RX ADMIN — DEXMEDETOMIDINE 0.3 MCG/KG/HR: 100 INJECTION, SOLUTION, CONCENTRATE INTRAVENOUS at 10:59

## 2017-11-10 RX ADMIN — SODIUM CHLORIDE: 0.9 INJECTION, SOLUTION INTRAVENOUS at 11:00

## 2017-11-10 RX ADMIN — FENTANYL CITRATE 100 MCG: 50 INJECTION, SOLUTION INTRAMUSCULAR; INTRAVENOUS at 10:56

## 2017-11-10 RX ADMIN — KETAMINE HYDROCHLORIDE 50 MG: 50 INJECTION INTRAMUSCULAR; INTRAVENOUS at 11:02

## 2017-11-10 RX ADMIN — KETAMINE HYDROCHLORIDE 0.15 MG/KG/HR: 50 INJECTION INTRAMUSCULAR; INTRAVENOUS at 10:59

## 2017-11-10 RX ADMIN — METOCLOPRAMIDE HYDROCHLORIDE 10 MG: 5 INJECTION INTRAMUSCULAR; INTRAVENOUS at 11:58

## 2017-11-10 RX ADMIN — SCOPALAMINE 1 PATCH: 1 PATCH, EXTENDED RELEASE TRANSDERMAL at 10:00

## 2017-11-10 RX ADMIN — PROPOFOL 100 MCG/KG/MIN: 10 INJECTION, EMULSION INTRAVENOUS at 10:59

## 2017-11-10 RX ADMIN — LIDOCAINE HYDROCHLORIDE 100 MG: 10 INJECTION, SOLUTION INFILTRATION; PERINEURAL at 10:56

## 2017-11-10 RX ADMIN — NEOSTIGMINE METHYLSULFATE 4 MG: 1 INJECTION, SOLUTION INTRAMUSCULAR; INTRAVENOUS; SUBCUTANEOUS at 12:22

## 2017-11-10 RX ADMIN — FENTANYL CITRATE 50 MCG: 50 INJECTION INTRAMUSCULAR; INTRAVENOUS at 12:49

## 2017-11-10 RX ADMIN — MIDAZOLAM HYDROCHLORIDE 2 MG: 1 INJECTION, SOLUTION INTRAMUSCULAR; INTRAVENOUS at 10:50

## 2017-11-10 RX ADMIN — PROPOFOL 200 MG: 10 INJECTION, EMULSION INTRAVENOUS at 10:56

## 2017-11-10 RX ADMIN — ONDANSETRON 4 MG: 2 INJECTION INTRAMUSCULAR; INTRAVENOUS at 11:57

## 2017-11-10 RX ADMIN — ENOXAPARIN SODIUM 40 MG: 40 INJECTION SUBCUTANEOUS at 10:10

## 2017-11-10 NOTE — ANESTHESIA POSTPROCEDURE EVALUATION
Post-Op Assessment Note      CV Status:  Stable    Post-procedure mental status: resting, arousable      Hydration Status:  Stable    PONV Controlled:  None    Airway Patency:  Patent    Post Op Vitals Reviewed: Yes          Staff: CRNA           BP   136/84   Temp   97 0   Pulse  78   Resp   21   SpO2   97% on 6LFM   Postop VS in PACU noted above, SV non-obstructed, denies pain

## 2017-11-10 NOTE — ANESTHESIA PREPROCEDURE EVALUATION
Review of Systems/Medical History  Patient summary reviewed    History of anesthetic complications PONV    Cardiovascular  DVT  PE,  Pulmonary  Negative pulmonary ROS ,        GI/Hepatic    GERD ,        Negative  ROS        Endo/Other  Arthritis     GYN  Negative gynecology ROS          Hematology  Negative hematology ROS      Musculoskeletal  Obesity  morbid obesity,        Neurology  Negative neurology ROS   Neuromuscular disease (CRPS) ,    Psychology   Negative psychology ROS            Physical Exam    Airway    Mallampati score: II  TM Distance: >3 FB  Neck ROM: full     Dental       Cardiovascular  Cardiovascular exam normal    Pulmonary  Pulmonary exam normal     Other Findings        Anesthesia Plan  ASA Score- 3       Anesthesia Type- general with ASA Monitors  Additional Monitors:   Airway Plan: ETT  Induction- intravenous  Informed Consent- Anesthetic plan and risks discussed with patient

## 2017-11-10 NOTE — DISCHARGE INSTRUCTIONS
Ovarian Cyst   WHAT YOU NEED TO KNOW:   An ovarian cyst is a sac that grows on an ovary  This sac usually contains fluid, but may sometimes have blood or tissue in it  Most ovarian cysts are harmless and go away without treatment in a few months  Some cysts can grow large, cause pain, or break open  DISCHARGE INSTRUCTIONS:   Call 911 for any of the following:   · You are too weak or dizzy to stand up  Return to the emergency department if:   · You have severe abdominal pain  The pain may be sharp and sudden  · You have a fever  Contact your healthcare provider if:   · Your periods are early, late, or more painful than usual     · You have bleeding from your vagina that is not your period  · You have abdominal pain all the time  · Your abdomen is swollen  · You have feelings of fullness, pressure, or discomfort in your abdomen  · You have trouble urinating or emptying your bladder completely  · You have pain during sex  · You are losing weight without trying  · You have questions or concerns about your condition or care  Medicines: You may need any of the following:  · NSAIDs , such as ibuprofen, help decrease swelling, pain, and fever  This medicine is available with or without a doctor's order  NSAIDs can cause stomach bleeding or kidney problems in certain people  If you take blood thinner medicine, always ask if NSAIDs are safe for you  Always read the medicine label and follow directions  Do not give these medicines to children under 10months of age without direction from your child's healthcare provider  · Birth control pills  may help to control your periods, prevent cysts, or cause them to shrink  · Take your medicine as directed  Contact your healthcare provider if you think your medicine is not helping or if you have side effects  Tell him or her if you are allergic to any medicine  Keep a list of the medicines, vitamins, and herbs you take   Include the amounts, and when and why you take them  Bring the list or the pill bottles to follow-up visits  Carry your medicine list with you in case of an emergency  Follow up with your healthcare provider as directed:  Write down your questions so you remember to ask them during your visits  Apply heat to decrease pain and cramping:  Sit in a warm bath, or place a heating pad (turned on low) or a hot water bottle on your abdomen  Do this for 15 to 20 minutes every hour for as many days as directed  © 2017 [address] [employer] Information is for End User's use only and may not be sold, redistributed or otherwise used for commercial purposes  All illustrations and images included in CareNotes® are the copyrighted property of [employer] M [employer]  or [employer]  The above information is an  only  It is not intended as medical advice for individual conditions or treatments  Talk to your doctor, nurse or pharmacist before following any medical regimen to see if it is safe and effective for you  Can restart Eliquis tomorrow  Laparoscopic Oophorectomy   WHAT YOU SHOULD KNOW:   Laparoscopic oophorectomy is surgery to remove one or both of your ovaries  Your surgeon will use a laparoscope (a thin tube with a light and tiny video camera on the end) and small tools  He may use a machine (robot) that has mechanical arms to operate the tools  AFTER YOU LEAVE:   Medicines: The following medicines may be ordered for you:  · NSAIDs  help decrease swelling and pain or fever  This medicine is available with or without a doctor's order  NSAIDs can cause stomach bleeding or kidney problems in certain people  If you take blood thinner medicine, always ask your healthcare provider if NSAIDs are safe for you  Always read the medicine label and follow directions  · Pain medicine  takes away or decreases pain  Do not wait until the pain is severe before you take your medicine  · Take your medicine as directed  Call your healthcare provider if you think your medicine is not helping or if you have side effects  Tell him if you are allergic to any medicine  Keep a list of the medicines, vitamins, and herbs you take  Include the amounts, and when and why you take them  Bring the list or the pill bottles to follow-up visits  Carry your medicine list with you in case of an emergency  Follow up with your primary healthcare provider or surgeon as directed: You may need to return to have your stitches removed  Write down your questions so you remember to ask them during your visits  Wound care:   · Do not remove the strips or medical glue used to close your incisions for a week or as directed by your surgeon  · Keep your surgical incision wound clean  Ask your primary healthcare provider or surgeon how to care for your wound  He will tell you when and how to clean it and to check for signs of infection  · Keep your wound dry  Do not take a shower or bath for 24 hours after surgery, or as directed by your primary healthcare provider  Ask how long you will need to keep your wound covered while you bathe, and what to use to cover it  Check your wound for signs of infection, such as redness or swelling  Self-care:   · You may have a sore throat if a tube was used to give you anesthesia during surgery  Use throat lozenges or gargle with warm salt water to help relieve your sore throat  · Do not lift heavy objects for up to 6 weeks after surgery or as directed by your primary healthcare provider  · Ask your primary healthcare provider when it is okay to start having sex again  Contact your primary healthcare provider or surgeon if:   · You have a fever  · You have shoulder or back pain or nausea that does not go away after a few days or gets worse  · Your wound is red, swollen, or draining pus  · You have questions or concerns about your condition or care    Seek care immediately or call 911 if:   · Blood soaks through your bandage  · Your leg feels warm, tender, and painful  It may look swollen and red  · You feel weak, dizzy, or faint  · You feel lightheaded, short of breath, or have chest pain  © 2014 [address] is for End User's use only and may not be sold, redistributed or otherwise used for commercial purposes  All illustrations and images included in CareNotes® are the copyrighted property of A D [employer] M , Inc  or [address]  The above information is an  only  It is not intended as medical advice for individual conditions or treatments  Talk to your doctor, nurse or pharmacist before following any medical regimen to see if it is safe and effective for you  Menopause   WHAT YOU NEED TO KNOW:   What is menopause? Menopause is a normal stage in a woman's life when her monthly periods stop  Menopause starts when the ovaries slowly stop making the female hormones estrogen and progesterone  After menopause, a woman is no longer able to become pregnant  A woman who has not had a period for a full year after the age of 39 is considered to be in menopause  Perimenopause is a stage before menopause that may cause signs and symptoms similar to menopause  Perimenopause can last an average of 4 to 5 years  What are the signs and symptoms of menopause?   The signs and symptoms of menopause can be different from woman to woman:  · Menstrual period changes such as skipped periods or periods that are closer together, or lighter or heavier than usual    · Hot flashes (feeling warm, flushed, and sweaty)     · Mood changes such as irritability or decreased desire to have sex    · Breast changes such as tenderness or pain    · Hair changes such as thinning hair or increased hair on your face    · Vaginal changes such as increased dryness     · Urinary changes such as increased urinary tract infections (UTIs) or urgency (feeling that you need to urinate right away)    · Other symptoms such as headaches, trouble sleeping, fatigue, or heart palpitations (strong, fast heartbeats)  What do I need to know about menopause? · You can still get pregnant while you have periods  Continue to use birth control if you do not want to get pregnant  You may need to use birth control until it has been 1 year since your periods stopped  Ask your healthcare provider when you can stop using birth control to prevent pregnancy  · Hormone replacement therapy can be used to treat symptoms of menopause  Hormone replacement therapy (HRT) is medicine that replaces your low hormone levels  HRT contains estrogen and sometimes progestin  HRT has benefits and risks  HRT decreases your risk for bone fractures by helping to prevent osteoporosis  HRT also protects you from colon cancer  HRT may increase your risk for breast cancer, blood clots, heart disease, and stroke  Ask your healthcare provider if HRT is right for you  How can I live a healthy lifestyle during and after menopause? After menopause, your risk for heart disease and bone loss increases  Ask about these and other ways to stay healthy:  · Exercise regularly  Exercise helps you maintain a healthy weight  Exercise can also help to control your blood pressure and cholesterol levels  Include weight-bearing exercise for strong bones  Ask your healthcare provider about the best exercise plan for you  · Eat a variety of healthy foods  Include fruits, vegetables, whole grains (whole-wheat bread, pasta, and cereals), low-fat dairy, and lean protein foods (beans, poultry, and fish)  Limit foods high in sodium (salt)  Ask your healthcare provider for more information about a meal plan that is right for you  · Maintain a healthy weight  Check with your healthcare provider before you start any weight loss program      · Take supplements as directed  You may need extra calcium and vitamin D to help prevent osteoporosis       · Limit alcohol and caffeine  Alcohol and caffeine may worsen your symptoms  · Do not smoke  If you smoke, it is never too late to quit  You are more likely to have a heart attack, lung disease, blood clots, and cancer if you smoke  Ask your healthcare provider for information if you need help quitting  When should I contact my healthcare provider? · You have vaginal bleeding after menopause  · You have questions or concerns about your condition or care  CARE AGREEMENT:   You have the right to help plan your care  Learn about your health condition and how it may be treated  Discuss treatment options with your caregivers to decide what care you want to receive  You always have the right to refuse treatment  The above information is an  only  It is not intended as medical advice for individual conditions or treatments  Talk to your doctor, nurse or pharmacist before following any medical regimen to see if it is safe and effective for you  © 2017 [address]  Information is for End User's use only and may not be sold, redistributed or otherwise used for commercial purposes  All illustrations and images included in CareNotes® are the copyrighted property of [employer] , Inc  or [employer]

## 2017-11-12 NOTE — OP NOTE
OPERATIVE REPORT  PATIENT NAME: Jaylene King    :  1970  MRN: [id number]  Pt Location: [facility] OR ROOM 04    SURGERY DATE: 11/10/2017    Surgeon(s) and Role:     * Yasmeen Holden MD - Primary    Preop Diagnosis:  Right ovarian cyst [N83 201]  Pelvic and perineal pain [R10 2]    Post-Op Diagnosis Codes:     * Right ovarian cyst [N83 201]     * Pelvic and perineal pain [R10 2]    Procedure(s) (LRB):  LAPAROSCOPIC REMOVAL OF BILATERAL OVARIES;  LYSIS OF ADHESIONS (N/A)    Specimen(s):  ID Type Source Tests Collected by Time Destination   1 : Right Ovary  Tissue Ovary, Right TISSUE EXAM Yasmeen Holden MD 11/10/2017 1139    2 : Left Ovary  Tissue Ovary, Left TISSUE EXAM Yasmeen Holden MD 11/10/2017 1139        Estimated Blood Loss:   Minimal    Drains:       Anesthesia Type:   General    Operative Indications:  Right ovarian cyst [N83 201]  Pelvic and perineal pain [R10 2]      Operative Findings:  1  Healing boil on right of umbilicus  2  Right Ovary 10cm - no adhesions, ruptured while trying to place specimen into bag-clear fluid  3  Left Ovary - normal in appearance medial pole scarred to anterior abdominal wall with small bowel adhesions as well  4  Anterior abdominal wall with omental adhesions  Complications:   None    Procedure and Technique:  The patient was taken to the operating room and placed in supine position on OR table  General anesthesia was established without difficulty  The patient was then positioned on the operating table in the dorsal lithotomy position with the legs supported using stirrups  All pressure points were padded and a Gerard hugger was placed to maintain control of core body temperature  The patient was then prepped and draped in the usual sterile fashion  A time out was performed to confirm correct patient and correct procedure        The bladder was drained with catheter        Surgeons gloves were then changed and attention was turned to the abdomen  A small incision was then made vertically 2cm above the umbilicus, and a 5mm trocar and sleeve were inserted through the incision into the peritoneum under direct visualization  Laparoscopic visualization confirmed the intraperitoneal insertion of the port  Pneumoperitoneum was established to 15mmHg and maintained using carbon dioxide  Local anesthesia was injected into each port site      Subsequently, two additional small incisions were made in both the right and left lower quadrants, approximately 3 cm above and 2 cm medial to the anterior superior iliac spine  Two ports 11mm (left), 5mm (right) were introduced under direct visualization       The patient was placed in trendelenburg, the entire pelvis was inspected revealing the above noted findings       The anterior abdominal wall adhesions were taken down first using the Harmonic Ace  Attention was then turned to the left adnexa  The ovary was adhered to the lateral anterior abdominal wall  This adhesion was released as well as the small bowel adhesions to the medial ovarian pole  The infundibulopelvic ligament was identified, sealed and  with good hemostasis  The specimen was placed into an endocatch bag in its entirety  The right ovary was elevated and the IPL was identified sealed and  with the Harmonic Ace with good hemostasis  While lifting the ovary to place it into an Endocatch bag, the grasper pierced a cyst and clear fluid was seen  At this time to aid in removal of the specimen, I used an aspirating needle attached to suction to decompress the ovarian cyst   The specimen was than placed into the bag and sealed        The abdomen and pelvis was visualized and good hemostasis was noted  Irrigation was completed  The incision was extended slightly to allow for better exposure  The Right ovary and cyst was aspirated and fluid suctioned out of the bag    This decompressed the specimen and it was pulled through the incision with the bag intact  The left side was easily removed  Pneumoperitoneum was then evacuated  Trocars were removed  The fascia of the 11 mm incision (extended to 3cm)  was grasped with a Solomon Lass and a 0-vicryl was used to close the fascia  The skin was closed with a subcutaneous stitch and covered with Histacryl  The 5 mm trochar incisions were closed with Histoacryl  All instruments were then removed from the vagina      She was placed in the supine position and awakened from general anesthesia without difficulty  She tolerated the procedure well and was transferred to the recovery room in stable condition  All needle, sponge, and instrument counts were noted to be correct at the end of the procedure      I was present for the entire procedure and A qualified resident physician was not available    Patient Disposition:  PACU     SIGNATURE: Jose Montero MD  DATE: November 12, 2017  TIME: 9:50 AM

## 2017-11-21 ENCOUNTER — GENERIC CONVERSION - ENCOUNTER (OUTPATIENT)
Dept: OTHER | Facility: OTHER | Age: 47
End: 2017-11-21

## 2017-11-24 ENCOUNTER — GENERIC CONVERSION - ENCOUNTER (OUTPATIENT)
Dept: OTHER | Facility: OTHER | Age: 47
End: 2017-11-24

## 2018-01-12 VITALS
BODY MASS INDEX: 40.8 KG/M2 | HEART RATE: 80 BPM | SYSTOLIC BLOOD PRESSURE: 124 MMHG | WEIGHT: 230.25 LBS | HEIGHT: 63 IN | DIASTOLIC BLOOD PRESSURE: 76 MMHG | RESPIRATION RATE: 14 BRPM

## 2018-01-12 VITALS
SYSTOLIC BLOOD PRESSURE: 122 MMHG | BODY MASS INDEX: 40.07 KG/M2 | HEIGHT: 63 IN | WEIGHT: 226.13 LBS | DIASTOLIC BLOOD PRESSURE: 72 MMHG

## 2018-01-12 NOTE — MISCELLANEOUS
Message   Recorded as Task   Date: 09/15/2017 07:30 AM, Created By: Santos Angel   Task Name: Go to Result   Assigned To: Colin Freeman   Regarding Patient: Aldo Walters, Status: In Progress   Husambronwyn Gardner - 15 Sep 2017 7:30 AM     TASK CREATED  please call Nisha Arias ultrasound shows that cyst is still present  she should come in to discuss possible surgery to remove the cyst   nIga Madden - 15 Sep 2017 7:42 AM     TASK IN PROGRESS   Inga Madden - 15 Sep 2017 7:47 AM     TASK REPLIED TO: Previously Assigned To [facility],Team  Left message to call me bk @ my ex   Inga Madden - 15 Sep 2017 2:03 PM     TASK REPLIED TO: Previously Assigned To [address] pt again no answer left message to call us valerie  Inga Madden - 15 Sep 2017 2:04 PM     TASK EDITED   Deepika Azar - 18 Sep 2017 7:43 AM     TASK IN PROGRESS   Deepika Azar - 18 Sep 2017 7:48 AM     TASK EDITED  [facility] for pt to cb - tried to call the home # listed, but it never rang  ext: [address] - 18 Sep 2017 11:19 AM     TASK EDITED  lmtcb   Liliana Lainez - 18 Sep 2017 12:10 PM     TASK EDITED  Pt is seeing KTM on 20th to discuss cysts found on [address] - 18 Sep 2017 12:43 PM     TASK IN PROGRESS        Active Problems    1  Abdomen enlarged (789 30) (R19 8)   2  Abdominal pain, acute, right lower quadrant (789 03,338 19) (R10 31)   3  Actinic keratosis (702 0) (L57 0)   4  Amputation of leg (V49 70) (Z89 619)   5  Anticoagulant long-term use (V58 61) (Z79 01)   6  Autoimmune disease (279 49) (M35 9)   7  Changing skin lesion (709 9) (L98 9)   8  Cyst of ovary, right (620 2) (N83 201)   9  Depression (311) (F32 9)   10  Dermatitis (692 9) (L30 9)   11  Elevated liver enzymes (790 5) (R74 8)   12  Exertional angina (413 9) (I20 8)   13  Hyperlipidemia (272 4) (E78 5)   14  Impaired fasting glucose (790 21) (R73 01)   15   Inflammatory spondylopathy of lumbosacral region (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 9) (M46 97)   16  Microcytosis (790 09) (R71 8)   17  Multiple benign nevi (216 9) (D22 9)   18  Ovarian mass (620 9) (N83 9)   19  Pelvic mass (789 30) (R19 00)   20  Pulmonary embolism (415 19) (I26 99)   21  RSD (reflex sympathetic dystrophy) (337 20) (G90 50)   22  Skin neoplasm (239 2) (D49 2)   23  Urine retention (788 20) (R33 9)   24  Vertigo (780 4) (R42)    Current Meds   1  B Complex Plus Oral Tablet Recorded   2  Desonide 0 05 % External Cream; apply sparingly to face bid  1 week; Therapy: 53Ooo7965 to (Last Rx:84Mwb9516)  Requested for: 59VIX1844 Ordered   3  Folic Acid 1 MG Oral Tablet; 2 tabs daily; Last Rx:41Tvp0552 Ordered   4  Glucosamine Chondro Complex TABS; Therapy: (Recorded:10Efd4522) to Recorded   5  LORazepam 0 5 MG Oral Tablet; take 1-2 tabs q 8 hours as needed; Last Rx:40Vhs9029   Ordered   6  Methotrexate 2 5 MG Oral Tablet; 6 q Tuesday; Last Rx:78Yhc9271 Ordered   7  Omeprazole 20 MG Oral Capsule Delayed Release; take bid  Requested for:   96Ifw8215; Last Rx:81Kzi9044 Ordered   8  Probiotic Oral Capsule Recorded   9  Rosuvastatin Calcium 10 MG Oral Tablet (Crestor); take 1 tablet by mouth every day; Therapy: 20FFE6967 to ([phone])  Requested for: 41MWQ8065; Last   Rx:88Fjb0657 Ordered   10  Sertraline HCl - 100 MG Oral Tablet; Take 1 tablet daily; Therapy: 78XGB7135 to (Gege Gottron)  Requested for: 17FQK6170; Last    Rx:30Mar2017 Ordered   11  SulfaSALAzine 500 MG Oral Tablet; TAKE 6 TABLET Daily; Therapy: (Recorded:11Lpf9656) to Recorded   12  TraMADol HCl - 50 MG Oral Tablet; take one tablet by mouth every six hours as needed; Last Rx:39Dhv4517 Ordered   13  Vitamin D 1000 UNIT Oral Tablet Recorded   14  Xarelto 20 MG Oral Tablet; take one tablet by mouth every day; Therapy: 74Cvh9659 to (Evaluate:47Zmq7998)  Requested for: 20Clt5336; Last    Rx:39Why4281 Ordered    Allergies    1  Ciprofloxacin HCl TABS   2  Codeine Sulfate TABS   3   Morphine Sulfate TABS   4   Penicillins    Signatures   Electronically signed by : Kimani Willams, ; Sep 18 2017  5:13PM EST                       (Author)

## 2018-01-13 VITALS
WEIGHT: 229.13 LBS | DIASTOLIC BLOOD PRESSURE: 88 MMHG | HEART RATE: 84 BPM | RESPIRATION RATE: 14 BRPM | SYSTOLIC BLOOD PRESSURE: 120 MMHG | HEIGHT: 64 IN | BODY MASS INDEX: 39.12 KG/M2

## 2018-01-13 VITALS
WEIGHT: 227 LBS | DIASTOLIC BLOOD PRESSURE: 72 MMHG | SYSTOLIC BLOOD PRESSURE: 118 MMHG | BODY MASS INDEX: 38.76 KG/M2 | HEIGHT: 64 IN

## 2018-01-13 VITALS
HEART RATE: 94 BPM | DIASTOLIC BLOOD PRESSURE: 78 MMHG | WEIGHT: 230 LBS | SYSTOLIC BLOOD PRESSURE: 118 MMHG | OXYGEN SATURATION: 96 % | HEIGHT: 63 IN | BODY MASS INDEX: 40.75 KG/M2

## 2018-01-13 VITALS — SYSTOLIC BLOOD PRESSURE: 122 MMHG | DIASTOLIC BLOOD PRESSURE: 70 MMHG | HEIGHT: 64 IN

## 2018-01-16 NOTE — MISCELLANEOUS
Message   Recorded as Task   Date: 09/15/2017 07:30 AM, Created By: Janett Etienne   Task Name: Go to Result   Assigned To: Jaswinder Cole   Regarding Patient: Karan Jacobo, Status: In Progress   CommentCheri Spine - 15 Sep 2017 7:30 AM     TASK CREATED  please call Mitchel Velarde ultrasound shows that cyst is still present  she should come in to discuss possible surgery to remove the cyst   Rani Bal - 15 Sep 2017 7:42 AM     TASK IN PROGRESS   Rani Bal - 15 Sep 2017 7:47 AM     TASK REPLIED TO: Previously Assigned To [facility],Team  Left message to call me valerie @ my ex   Rani Bal - 15 Sep 2017 2:03 PM     TASK REPLIED TO: Previously Assigned To [address] pt again no answer left message to call us valerie  Active Problems    1  Abdomen enlarged (789 30) (R19 8)   2  Abdominal pain, acute, right lower quadrant (789 03,338 19) (R10 31)   3  Actinic keratosis (702 0) (L57 0)   4  Amputation of leg (V49 70) (Z89 619)   5  Anticoagulant long-term use (V58 61) (Z79 01)   6  Autoimmune disease (279 49) (M35 9)   7  Changing skin lesion (709 9) (L98 9)   8  Cyst of ovary, right (620 2) (N83 201)   9  Depression (311) (F32 9)   10  Dermatitis (692 9) (L30 9)   11  Elevated liver enzymes (790 5) (R74 8)   12  Exertional angina (413 9) (I20 8)   13  Hyperlipidemia (272 4) (E78 5)   14  Impaired fasting glucose (790 21) (R73 01)   15  Inflammatory spondylopathy of lumbosacral region (720 9) (M46 97)   16  Microcytosis (790 09) (R71 8)   17  Multiple benign nevi (216 9) (D22 9)   18  Ovarian mass (620 9) (N83 9)   19  Pelvic mass (789 30) (R19 00)   20  Pulmonary embolism (415 19) (I26 99)   21  RSD (reflex sympathetic dystrophy) (337 20) (G90 50)   22  Skin neoplasm (239 2) (D49 2)   23  Urine retention (788 20) (R33 9)   24  Vertigo (780 4) (R42)    Current Meds   1  B Complex Plus Oral Tablet Recorded   2   Desonide 0 05 % External Cream; apply sparingly to face bid  1 week; Therapy: 73Ggc4703 to (Last Rx:30Vns5405)  Requested for: 10LOT8033 Ordered   3  Folic Acid 1 MG Oral Tablet; 2 tabs daily; Last Rx:79Nup2425 Ordered   4  Glucosamine Chondro Complex TABS; Therapy: (Recorded:71Xeb8666) to Recorded   5  LORazepam 0 5 MG Oral Tablet; take 1-2 tabs q 8 hours as needed; Last Rx:88Sje9390   Ordered   6  Methotrexate 2 5 MG Oral Tablet; 6 q Tuesday; Last Rx:23Dvf8632 Ordered   7  Omeprazole 20 MG Oral Capsule Delayed Release; take bid  Requested for:   80Xyc2773; Last Rx:15Uiu0977 Ordered   8  Probiotic Oral Capsule Recorded   9  Rosuvastatin Calcium 10 MG Oral Tablet (Crestor); take 1 tablet by mouth every day; Therapy: 72FPD5683 to (96 529480)  Requested for: 04TMW0777; Last   Rx:21Jnp5417 Ordered   10  Sertraline HCl - 100 MG Oral Tablet; Take 1 tablet daily; Therapy: 59FUO2104 to (077 0252 9144)  Requested for: 85BFY1999; Last    Rx:45Azm7845 Ordered   11  SulfaSALAzine 500 MG Oral Tablet; TAKE 6 TABLET Daily; Therapy: (Recorded:42Ffu5172) to Recorded   12  TraMADol HCl - 50 MG Oral Tablet; take one tablet by mouth every six hours as needed; Last Rx:49Ujq8172 Ordered   13  Vitamin D 1000 UNIT Oral Tablet Recorded   14  Xarelto 20 MG Oral Tablet; take one tablet by mouth every day; Therapy: 78Uua9029 to (Evaluate:22Gih5910)  Requested for: 67Mzp9084; Last    Rx:82Rns0290 Ordered    Allergies    1  Ciprofloxacin HCl TABS   2  Codeine Sulfate TABS   3  Morphine Sulfate TABS   4   Penicillins    Signatures   Electronically signed by : Hazeline Najjar, MA; Sep 15 2017  2:04PM EST                       (Author)

## 2018-01-18 NOTE — CONSULTS
Chief Complaint  preop      History of Present Illness  Pre-Op Visit (Brief): The patient is being seen for a preoperative visit and oopherectomy/salpingo  Surgical Risk Assessment:   Prior Anesthesia: She had prior anesthesia, no prior adverse reaction to edidural anesthesia, no prior adverse reaction to spinal anesthesia and no prior adverse reaction to general anesthesia  Pertinent Past Medical History: no pertinent past medical history  Exercise Capacity: able to walk four blocks without symptoms and able to walk two flights of stairs without symptoms  Lifestyle Factors: denies tobacco use  Symptoms: no symptoms  HPI: bit by pig right lower ext, red but healing not sure if she needs tdap      Review of Systems    Constitutional: No fever, no chills, feels well, no tiredness, no recent weight gain or weight loss  Eyes: No complaints of eye pain, no red eyes, no eyesight problems, no discharge, no dry eyes, no itching of eyes  ENT: no complaints of earache, no loss of hearing, no nose bleeds, no nasal discharge, no sore throat, no hoarseness  Cardiovascular: No complaints of slow heart rate, no fast heart rate, no chest pain, no palpitations, no leg claudication, no lower extremity edema  Respiratory: No complaints of shortness of breath, no wheezing, no cough, no SOB on exertion, no orthopnea, no PND  Gastrointestinal: No complaints of abdominal pain, no constipation, no nausea or vomiting, no diarrhea, no bloody stools  Genitourinary: No complaints of dysuria, no incontinence, no pelvic pain, no dysmenorrhea, no vaginal discharge or bleeding  Musculoskeletal: No complaints of arthralgias, no myalgias, no joint swelling or stiffness, no limb pain or swelling  Integumentary: as noted in HPI  Neurological: No complaints of headache, no confusion, no convulsions, no numbness, no dizziness or fainting, no tingling, no limb weakness, no difficulty walking     Psychiatric: Not suicidal, no sleep disturbance, no anxiety or depression, no change in personality, no emotional problems  Endocrine: No complaints of proptosis, no hot flashes, no muscle weakness, no deepening of the voice, no feelings of weakness  Hematologic/Lymphatic: No complaints of swollen glands, no swollen glands in the neck, does not bleed easily, does not bruise easily  Active Problems    1  Abdomen enlarged (789 30) (R19 8)   2  Abdominal pain, acute, right lower quadrant (789 03,338 19) (R10 31)   3  Actinic keratosis (702 0) (L57 0)   4  Amputation of leg (V49 70) (Z89 619)   5  Anticoagulant long-term use (V58 61) (Z79 01)   6  Autoimmune disease (279 49) (M35 9)   7  Changing skin lesion (709 9) (L98 9)   8  Cyst of ovary, right (620 2) (N83 201)   9  Depression (311) (F32 9)   10  Dermatitis (692 9) (L30 9)   11  Elevated liver enzymes (790 5) (R74 8)   12  Exertional angina (413 9) (I20 8)   13  Hyperlipidemia (272 4) (E78 5)   14  Impaired fasting glucose (790 21) (R73 01)   15  Inflammatory spondylopathy of lumbosacral region (720 9) (M46 97)   16  Microcytosis (790 09) (R71 8)   17  Multiple benign nevi (216 9) (D22 9)   18  Ovarian mass (620 9) (N83 9)   19  Pelvic mass (789 30) (R19 00)   20  Pre-op testing (V72 84) (Z01 818)   21  Pulmonary embolism (415 19) (I26 99)   22  RSD (reflex sympathetic dystrophy) (337 20) (G90 50)   23  Skin neoplasm (239 2) (D49 2)   24  Urine retention (788 20) (R33 9)   25  Vertigo (780 4) (R42)    Past Medical History    · H/O nonmelanoma skin cancer (V10 83) (D70 103)   · History of arthritis (V13 4) (Z87 39)   · History of squamous cell carcinoma (V10 89) (Z85 89)   · History of urinary incontinence (V13 09) (A45 335)   · History of uterine leiomyoma (V13 29) (Z86 018)   · History of Other screening mammogram (V76 12) (Z12 31)    The active problems and past medical history were reviewed and updated today        Surgical History    · History of Amputation Of Leg Below Knee   · History of Ankle Surgery   · History of  Section   · History of Hysterectomy   · History of Spinal Stereotaxis Stimulation Of Cord    The surgical history was reviewed and updated today  Family History    · Family history of Coronary artery disease due to calcified coronary lesion   · Family history of hypertension (V17 49) (Z82 49)    · Family history of Type 2 diabetes, controlled, with neuropathy    · Family history of Vitiligo    · Family history of Vitiligo    · Family history of malignant neoplasm of breast (V16 3) (Z80 3)    · Family history of malignant neoplasm of breast (V16 3) (Z80 3)    · Family history of malignant neoplasm of breast (V16 3) (Z80 3)    The family history was reviewed and updated today  Social History    · Currently sexually active   ·    · Never smoker   · Occasional alcohol use   · Retired  The social history was reviewed and updated today  Current Meds   1  B Complex Plus Oral Tablet Recorded   2  Desonide 0 05 % External Cream; apply sparingly to face bid  1 week; Therapy: 28Kvo0026 to (Last Rx:55Ggm1820)  Requested for: 95IFV5623 Ordered   3  Folic Acid 1 MG Oral Tablet; 2 tabs daily; Last Rx:37Nib0690 Ordered   4  Glucosamine Chondro Complex TABS; Therapy: (Recorded:99Iat1969) to Recorded   5  LORazepam 0 5 MG Oral Tablet; take 1-2 tabs q 8 hours as needed; Last Rx:22Jtp6564   Ordered   6  Methotrexate 2 5 MG Oral Tablet; 6 q Tuesday; Last Rx:44Wmk4001 Ordered   7  Omeprazole 20 MG Oral Capsule Delayed Release; take bid  Requested for:   88Azt0185; Last Rx:15Ztl2764 Ordered   8  Probiotic Oral Capsule Recorded   9  Rosuvastatin Calcium 10 MG Oral Tablet; take 1 tablet by mouth every day; Therapy: 82NKJ4439 to (77 879 135)  Requested for: 22RCZ9519; Last   Rx:86Rkw3830 Ordered   10  Sertraline HCl - 100 MG Oral Tablet; Take 1 tablet daily; Therapy: 41FPA1242 to (14650 61 83 15)  Requested for: 81DWJ2471; Last    Rx:14Vko7546 Ordered   11  SulfaSALAzine 500 MG Oral Tablet; TAKE 6 TABLET Daily; Therapy: (Recorded:23Abp8710) to Recorded   12  TraMADol HCl - 50 MG Oral Tablet; take one tablet by mouth every six hours as needed; Last Rx:48Zzt2213 Ordered   13  Vitamin D 1000 UNIT Oral Tablet Recorded   14  Xarelto 20 MG Oral Tablet; take one tablet by mouth every day; Therapy: 78Eec7597 to (Evaluate:85Jub9159)  Requested for: 08Ltu7993; Last    Rx:73Orv7232 Ordered    The medication list was reviewed and updated today  Allergies    1  Ciprofloxacin HCl TABS   2  Codeine Sulfate TABS   3  Morphine Sulfate TABS   4  Penicillins    Vitals  Signs    Heart Rate: 84  Respiration: 14  Systolic: 189  Diastolic: 88  Height: 5 ft 4 in  Weight: 229 lb 2 oz  BMI Calculated: 39 33  BSA Calculated: 2 07    Physical Exam    Constitutional   General appearance: No acute distress, well appearing and well nourished  Head and Face   Head and face: Normal     Eyes   Conjunctiva and lids: No swelling, erythema or discharge  Neck   Neck: Supple, symmetric, trachea midline, no masses  Pulmonary   Auscultation of lungs: Clear to auscultation  Cardiovascular   Auscultation of heart: Normal rate and rhythm, normal S1 and S2, no murmurs  Carotid pulses: 2+ bilaterally  Examination of extremities for edema and/or varicosities: Normal     Abdomen   Abdomen: Non-tender, no masses  Lymphatic   Palpation of lymph nodes in neck: No lymphadenopathy  Musculoskeletal   Gait and station: Normal     Skin   Examination of the skin for lesions: Abnormal   small erthythematous abrasions to skin  Assessment    1  Impaired fasting glucose (790 21) (R73 01)   2  Hyperlipidemia (272 4) (E78 5)   3  Animal bites (879 8,E906 5) (T14 8XXA)   4  Preoperative clearance (V72 84) (Z01 818)    Plan   Animal bites    · Tdap    (1) HEMOGLOBIN A1C; Status:Resulted - Requires Verification;   Done: 17HXD2620 12:00AM  Due:30Oct2018; Ordered;    For:Hyperlipidemia, Impaired fasting glucose; Ordered By:Sha Hand;   (1) LIPID PANEL, FASTING; Status:Resulted - Requires Verification;   Done: 63FFX0468 12:00AM  Due:30Oct2018; Ordered;    For:Hyperlipidemia, Impaired fasting glucose; Ordered By:Sha Hand; Discussion/Summary  Surgical Clearance: She is at a LOW TO MODERATE risk from a cardiovascular standpoint at this time without any additional cardiac testing  Reevaluation needed, if she should present with symptoms prior to surgery/procedure  Surgical clearance faxed to Dr Tip Vivar   No medical contraindications to proceed with surgery  She will hold her Xarelto 48 hours prior to surgery and she can resume when okayed by surgery  Education  Education Items with no Session   Tdap;  Provided: 93HCS3440 10:07AM; Counselor: Staci Gloria; End of Encounter Meds    1  Sertraline HCl - 100 MG Oral Tablet; Take 1 tablet daily; Therapy: 47WVL4007 to (077 0252 9144)  Requested for: 94WUL5633; Last   Rx:30Mar2017 Ordered    2  Desonide 0 05 % External Cream; apply sparingly to face bid  1 week; Therapy: 49Qgp1239 to (Last Rx:30Psy1240)  Requested for: 61JXM1629 Ordered    3  Folic Acid 1 MG Oral Tablet; 2 tabs daily; Last Rx:86Dvg6749 Ordered   4  LORazepam 0 5 MG Oral Tablet; take 1-2 tabs q 8 hours as needed; Last Rx:75Iog6350   Ordered   5  Omeprazole 20 MG Oral Capsule Delayed Release; take bid  Requested for:   84Bbb3150; Last Rx:17Ozq0714 Ordered   6  TraMADol HCl - 50 MG Oral Tablet; take one tablet by mouth every six hours as needed; Last Rx:54Egq7518 Ordered    7  Rosuvastatin Calcium 10 MG Oral Tablet (Crestor); take 1 tablet by mouth every day; Therapy: 59RDZ9018 to (96 019618)  Requested for: 32MGK9894; Last   Rx:61Rih6343 Ordered    8  Methotrexate 2 5 MG Oral Tablet; 6 q Tuesday; Last Rx:91Ioy3550 Ordered    9  Xarelto 20 MG Oral Tablet; take one tablet by mouth every day;    Therapy: 35Hkm3646 to (Evaluate:67Ywd3750)  Requested for: 66Lxr9827; Last   Rx:65Bnz4267 Ordered    10  B Complex Plus Oral Tablet Recorded   11  Glucosamine Chondro Complex TABS; Therapy: (Recorded:17Fic7689) to Recorded   12  Probiotic Oral Capsule Recorded   13  SulfaSALAzine 500 MG Oral Tablet; TAKE 6 TABLET Daily; Therapy: (Recorded:69Lnj0578) to Recorded   14   Vitamin D 1000 UNIT Oral Tablet Recorded    Signatures   Electronically signed by : Gloria Carey; Oct 31 2017  9:50AM EST                       (Author)    Electronically signed by : MAURO Salas ; Oct 31 2017  6:18PM EST

## 2018-01-22 VITALS
BODY MASS INDEX: 38.58 KG/M2 | WEIGHT: 226 LBS | SYSTOLIC BLOOD PRESSURE: 126 MMHG | HEIGHT: 64 IN | DIASTOLIC BLOOD PRESSURE: 84 MMHG

## 2018-01-22 VITALS
HEIGHT: 64 IN | BODY MASS INDEX: 38.84 KG/M2 | DIASTOLIC BLOOD PRESSURE: 72 MMHG | WEIGHT: 227.5 LBS | SYSTOLIC BLOOD PRESSURE: 126 MMHG | HEART RATE: 72 BPM | RESPIRATION RATE: 14 BRPM

## 2018-02-13 ENCOUNTER — TELEPHONE (OUTPATIENT)
Dept: INTERNAL MEDICINE CLINIC | Facility: CLINIC | Age: 48
End: 2018-02-13

## 2018-02-13 NOTE — TELEPHONE ENCOUNTER
[facility] [place] ([facility]) calling re: pt s clearance sheet for surgery next month which DD signed  She will be sending back to us for reconsideration of meds discontinuation    Instead of d/c Xarelto for 2 days, they require she d/c for 5 days but need Dr Abreu Speaker  She will be faxing to our live fax today      T: [phone] ext  [phone]  F: [phone]

## 2018-02-13 NOTE — TELEPHONE ENCOUNTER
There is no reason to discontinue Xarelto 5 days prior to surgery  She has had life-threatening venous thromboembolism  Half life of Xarelto is between 5 and 9 hours and will be eliminated within 2 days  Longer discontinuation can put her at high risk for recurrent venous thromboembolism

## 2018-02-27 ENCOUNTER — CONSULT (OUTPATIENT)
Dept: INTERNAL MEDICINE CLINIC | Facility: CLINIC | Age: 48
End: 2018-02-27
Payer: MEDICARE

## 2018-02-27 VITALS
BODY MASS INDEX: 41.92 KG/M2 | HEIGHT: 63 IN | HEART RATE: 80 BPM | DIASTOLIC BLOOD PRESSURE: 78 MMHG | RESPIRATION RATE: 16 BRPM | WEIGHT: 236.6 LBS | SYSTOLIC BLOOD PRESSURE: 136 MMHG

## 2018-02-27 DIAGNOSIS — M46.97 INFLAMMATORY SPONDYLOPATHY OF LUMBOSACRAL REGION (HCC): Primary | ICD-10-CM

## 2018-02-27 DIAGNOSIS — Z15.89 HLA B27 (HLA B27 POSITIVE): ICD-10-CM

## 2018-02-27 DIAGNOSIS — I26.99 OTHER PULMONARY EMBOLISM WITHOUT ACUTE COR PULMONALE, UNSPECIFIED CHRONICITY (HCC): ICD-10-CM

## 2018-02-27 DIAGNOSIS — M35.9 AUTOIMMUNE DISEASE (HCC): ICD-10-CM

## 2018-02-27 DIAGNOSIS — R73.01 IMPAIRED FASTING GLUCOSE: ICD-10-CM

## 2018-02-27 DIAGNOSIS — K21.9 GASTROESOPHAGEAL REFLUX DISEASE WITHOUT ESOPHAGITIS: ICD-10-CM

## 2018-02-27 PROCEDURE — 99214 OFFICE O/P EST MOD 30 MIN: CPT | Performed by: INTERNAL MEDICINE

## 2018-02-27 RX ORDER — PHENOL 1.4 %
600 AEROSOL, SPRAY (ML) MUCOUS MEMBRANE 2 TIMES DAILY WITH MEALS
COMMUNITY

## 2018-02-27 RX ORDER — LORAZEPAM 0.5 MG/1
TABLET ORAL
COMMUNITY
End: 2018-11-26 | Stop reason: SDUPTHER

## 2018-02-27 RX ORDER — FOLIC ACID 1 MG/1
3 TABLET ORAL DAILY
COMMUNITY

## 2018-02-27 NOTE — PATIENT INSTRUCTIONS
I see no medical contraindication to proceeding with planned surgery  Patient with significant history of venous thromboembolism and pulmonary embolism - temporary IVC filter is reasonable in prophylaxis against PE  Xarelto should be resumed at 10 mg daily postoperatively as soon as hemostasis is achieved and then IVC filter should be retrieved as soon as possible  Resume methotrexate and sulfasalazine postoperatively at discretion of Rheumatology and surgeon  Other medications should be continued  Throughout perioperative period

## 2018-03-02 ENCOUNTER — TELEPHONE (OUTPATIENT)
Dept: INTERNAL MEDICINE CLINIC | Facility: CLINIC | Age: 48
End: 2018-03-02

## 2018-03-02 NOTE — TELEPHONE ENCOUNTER
Spoke w/ alex, nothing received    I called and spoke anna marie/ [facility], they faxed several times to live fax, she is re faxing now

## 2018-03-02 NOTE — TELEPHONE ENCOUNTER
Received paperwork, faxed to central scanning to be scanned in and put a copy on dr [employer]    Forwarding message to dr Dennison Many

## 2018-03-02 NOTE — TELEPHONE ENCOUNTER
She is waiting for PCP clearance  PT's surgery is Mon  She faxed bloodwork, EKG CXR here several times  She rec'ed ov note but doesn't state that she is cleared  She has to get this ASAP this morning as it has to go to anesthesia

## 2018-03-08 ENCOUNTER — TELEPHONE (OUTPATIENT)
Dept: INTERNAL MEDICINE CLINIC | Facility: CLINIC | Age: 48
End: 2018-03-08

## 2018-03-08 NOTE — TELEPHONE ENCOUNTER
Pt cld from hospital stating that they won't release her and she wanted to know if Dr Hung Irwin could do something  I did explain that he is out until Lula Heard but is being updated   And if she feels that bad to have the nurse there call the on-call

## 2018-03-14 ENCOUNTER — TELEPHONE (OUTPATIENT)
Dept: INTERNAL MEDICINE CLINIC | Facility: CLINIC | Age: 48
End: 2018-03-14

## 2018-03-14 ENCOUNTER — LAB (OUTPATIENT)
Dept: LAB | Facility: CLINIC | Age: 48
End: 2018-03-14
Payer: MEDICARE

## 2018-03-14 ENCOUNTER — OFFICE VISIT (OUTPATIENT)
Dept: INTERNAL MEDICINE CLINIC | Facility: CLINIC | Age: 48
End: 2018-03-14
Payer: MEDICARE

## 2018-03-14 VITALS
HEART RATE: 76 BPM | HEIGHT: 63 IN | WEIGHT: 236.2 LBS | BODY MASS INDEX: 41.85 KG/M2 | RESPIRATION RATE: 14 BRPM | DIASTOLIC BLOOD PRESSURE: 80 MMHG | SYSTOLIC BLOOD PRESSURE: 142 MMHG

## 2018-03-14 DIAGNOSIS — M46.97 INFLAMMATORY SPONDYLOPATHY OF LUMBOSACRAL REGION (HCC): ICD-10-CM

## 2018-03-14 DIAGNOSIS — D50.0 BLOOD LOSS ANEMIA: Primary | ICD-10-CM

## 2018-03-14 DIAGNOSIS — D50.0 BLOOD LOSS ANEMIA: ICD-10-CM

## 2018-03-14 DIAGNOSIS — I26.99 OTHER PULMONARY EMBOLISM WITHOUT ACUTE COR PULMONALE, UNSPECIFIED CHRONICITY (HCC): ICD-10-CM

## 2018-03-14 LAB
ALBUMIN SERPL BCP-MCNC: 3.4 G/DL (ref 3.5–5)
ALP SERPL-CCNC: 75 U/L (ref 46–116)
ALT SERPL W P-5'-P-CCNC: 64 U/L (ref 12–78)
ANION GAP SERPL CALCULATED.3IONS-SCNC: 9 MMOL/L (ref 4–13)
AST SERPL W P-5'-P-CCNC: 28 U/L (ref 5–45)
BILIRUB SERPL-MCNC: 0.39 MG/DL (ref 0.2–1)
BUN SERPL-MCNC: 17 MG/DL (ref 5–25)
CALCIUM SERPL-MCNC: 9.5 MG/DL (ref 8.3–10.1)
CHLORIDE SERPL-SCNC: 102 MMOL/L (ref 100–108)
CO2 SERPL-SCNC: 25 MMOL/L (ref 21–32)
CREAT SERPL-MCNC: 0.8 MG/DL (ref 0.6–1.3)
ERYTHROCYTE [DISTWIDTH] IN BLOOD BY AUTOMATED COUNT: 19.7 % (ref 11.6–15.1)
ERYTHROCYTE [SEDIMENTATION RATE] IN BLOOD: 38 MM/HOUR (ref 0–20)
FERRITIN SERPL-MCNC: 69 NG/ML (ref 8–388)
GFR SERPL CREATININE-BSD FRML MDRD: 87 ML/MIN/1.73SQ M
GLUCOSE P FAST SERPL-MCNC: 143 MG/DL (ref 65–99)
HCT VFR BLD AUTO: 38.1 % (ref 34.8–46.1)
HGB BLD-MCNC: 12 G/DL (ref 11.5–15.4)
IRON SATN MFR SERPL: 18 %
IRON SERPL-MCNC: 60 UG/DL (ref 50–170)
MCH RBC QN AUTO: 24.5 PG (ref 26.8–34.3)
MCHC RBC AUTO-ENTMCNC: 31.5 G/DL (ref 31.4–37.4)
MCV RBC AUTO: 78 FL (ref 82–98)
PLATELET # BLD AUTO: 397 THOUSANDS/UL (ref 149–390)
PMV BLD AUTO: 10.4 FL (ref 8.9–12.7)
POTASSIUM SERPL-SCNC: 4.3 MMOL/L (ref 3.5–5.3)
PROT SERPL-MCNC: 7.7 G/DL (ref 6.4–8.2)
RBC # BLD AUTO: 4.9 MILLION/UL (ref 3.81–5.12)
SODIUM SERPL-SCNC: 136 MMOL/L (ref 136–145)
TIBC SERPL-MCNC: 342 UG/DL (ref 250–450)
WBC # BLD AUTO: 13.61 THOUSAND/UL (ref 4.31–10.16)

## 2018-03-14 PROCEDURE — 36415 COLL VENOUS BLD VENIPUNCTURE: CPT

## 2018-03-14 PROCEDURE — 82728 ASSAY OF FERRITIN: CPT

## 2018-03-14 PROCEDURE — 99214 OFFICE O/P EST MOD 30 MIN: CPT | Performed by: INTERNAL MEDICINE

## 2018-03-14 PROCEDURE — 85027 COMPLETE CBC AUTOMATED: CPT

## 2018-03-14 PROCEDURE — 83540 ASSAY OF IRON: CPT

## 2018-03-14 PROCEDURE — 83550 IRON BINDING TEST: CPT

## 2018-03-14 PROCEDURE — 85652 RBC SED RATE AUTOMATED: CPT

## 2018-03-14 PROCEDURE — 80053 COMPREHEN METABOLIC PANEL: CPT

## 2018-03-14 RX ORDER — TRAMADOL HYDROCHLORIDE 50 MG/1
1 TABLET ORAL EVERY 6 HOURS PRN
COMMUNITY
End: 2018-11-26

## 2018-03-14 RX ORDER — PREDNISONE 20 MG/1
TABLET ORAL
Refills: 0 | COMMUNITY
Start: 2018-03-09 | End: 2018-11-26

## 2018-03-14 RX ORDER — DIAZEPAM 5 MG/1
TABLET ORAL
Refills: 0 | COMMUNITY
Start: 2018-03-09 | End: 2019-04-12 | Stop reason: ALTCHOICE

## 2018-03-14 RX ORDER — MOMETASONE FUROATE 50 UG/1
SPRAY, METERED NASAL
COMMUNITY
Start: 2014-01-27 | End: 2018-12-17

## 2018-03-14 RX ORDER — OXYCODONE HYDROCHLORIDE AND ACETAMINOPHEN 5; 325 MG/1; MG/1
1 TABLET ORAL
Refills: 0 | COMMUNITY
Start: 2018-03-09 | End: 2018-11-26

## 2018-03-14 NOTE — PATIENT INSTRUCTIONS
Patient appears to be doing quite well postoperatively  I am concerned for the high doses of steroids for prolonged periods of time with regards to wound healing  She does appears stable for a more rapid taper and I have advised 2 days in between tapering each 5 mg dose rather than 5  Certainly, she should address this and follow up with her surgeon and rheumatologist as well and resume the methotrexate and sulfasalazine as soon as deemed safe and appropriate      Acute blood loss anemia- patient appears stable hemodynamically, check CBC and iron studies today and follow accordingly

## 2018-03-14 NOTE — TELEPHONE ENCOUNTER
----- Message from Claribel Chua MD sent at 3/14/2018  6:16 PM EDT -----  Notify-labs look pretty good, hemoglobin and iron levels are normal, from my perspective okay to stop the iron supplements

## 2018-03-14 NOTE — PROGRESS NOTES
Notify-labs look pretty good, hemoglobin and iron levels are normal, from my perspective okay to stop the iron supplements

## 2018-03-14 NOTE — PROGRESS NOTES
Assessment/Plan:    No problem-specific Assessment & Plan notes found for this encounter  Diagnoses and all orders for this visit:    Blood loss anemia  -     CBC; Future  -     Comprehensive metabolic panel; Future  -     Iron Panel; Future  -     Ferritin; Future    Other pulmonary embolism without acute cor pulmonale, unspecified chronicity (HCC)    Inflammatory spondylopathy of lumbosacral region ([facility] [address] )  -     Sedimentation rate, automated; Future    Other orders  -     oxyCODONE-acetaminophen (PERCOCET) 5-325 mg per tablet; Take 1 tablet by mouth Every 4 to 6 hours as needed for pain  -     predniSONE 20 mg tablet; 2 TABLET ORAL DAILY DURING MEAL TAPER 5 MG PO X5 MH EVERY 5 DAYS  -     polyethylene glycol-propylene glycol (SYSTANE) 0 4-0 3 %; Apply to eye  -     traMADol (ULTRAM) 50 mg tablet; Take 1 tablet by mouth every 6 (six) hours as needed  -     mometasone (NASONEX) 50 mcg/act nasal spray; into each nostril  -     diazepam (VALIUM) 5 mg tablet; TAKE 1 TABLET BY MOUTH EVERY 6 HOURS AS NEEDED FOR MUSCLE SPASM        Patient Instructions    Patient appears to be doing quite well postoperatively  I am concerned for the high doses of steroids for prolonged periods of time with regards to wound healing  She does appears stable for a more rapid taper and I have advised 2 days in between tapering each 5 mg dose rather than 5  Certainly, she should address this and follow up with her surgeon and rheumatologist as well and resume the methotrexate and sulfasalazine as soon as deemed safe and appropriate  Acute blood loss anemia- patient appears stable hemodynamically, check CBC and iron studies today and follow accordingly      Subjective:      Patient ID: Minerva Chaparro is a 50 y o  female  Patient was operated on March 5th  Procedure was scheduled for 2 and 0 5 hours but took more than 8 hours due to difficulty with the anterior approach due to scar tissue and adhesions    She was given 2 units of packed red blood cells intraoperatively, 1 unit on postoperative day 1 and then 2 more units on postoperative day 2  She had 2 CT scans to assess for bleeding both of which were presumably negative however I do not have them or any medical records from her hospitalization for review  She is not sure what her discharge hemoglobin was  Her last CT scan was on Wednesday March 7th  Xarelto was resumed on March 8th and she was discharged on March 9th  She had significant arthralgias and had been advised to discontinue her methotrexate and sulfasalazine 2 weeks preoperatively  She was put on a prolonged prednisone taper starting at 40 mg for 5 days and decreasing 5 mg every 5 days  She questions whether this can be tapered more quickly  Her joint pains are relatively well controlled, she just dropped to 35 mg today  She  Did discuss with her rheumatologist however no further directive was given on tapering the steroids or resuming the methotrexate and sulfasalazine  Presumably this is to be addressed at her surgical follow-up next week  She was fed clear liquids on postoperative day 1  She has not had any issues with bowel movements, ileus, melena, dysuria, hematuria  She has not had postoperative fevers or chills  She is concerned with a slight foot drop on the right  She had some calf pain on the left and had ultrasound that did not show evidence for deep vein thrombosis  The following portions of the patient's history were reviewed and updated as appropriate: allergies, current medications, past family history, past medical history, past social history, past surgical history and problem list     Review of Systems   Constitutional: Negative for appetite change, chills, diaphoresis, fatigue, fever and unexpected weight change  HENT: Negative for congestion, hearing loss and rhinorrhea  Eyes: Negative for visual disturbance     Respiratory: Negative for cough, chest tightness, shortness of breath and wheezing  Cardiovascular: Negative for chest pain, palpitations and leg swelling  Gastrointestinal: Negative for abdominal pain and blood in stool  Endocrine: Negative for cold intolerance, heat intolerance, polydipsia and polyuria  Genitourinary: Negative for difficulty urinating, dysuria, frequency and urgency  Musculoskeletal: Positive for arthralgias  Negative for myalgias  Skin: Negative for rash  Neurological: Negative for dizziness, weakness, light-headedness and headaches  Hematological: Does not bruise/bleed easily  Psychiatric/Behavioral: Negative for dysphoric mood and sleep disturbance  Objective:      /80 (BP Location: Left arm, Patient Position: Sitting)   Pulse 76   Resp 14   Ht 5' 3" (1 6 m)   Wt 107 kg (236 lb 3 2 oz)   BMI 41 84 kg/m²          Physical Exam   Constitutional: She is oriented to person, place, and time  She appears well-developed and well-nourished  HENT:   Head: Normocephalic and atraumatic  Nose: Nose normal    Mouth/Throat: Oropharynx is clear and moist    Eyes: Conjunctivae and EOM are normal  Pupils are equal, round, and reactive to light  No scleral icterus  Neck: Normal range of motion  Neck supple  No JVD present  No tracheal deviation present  No thyromegaly present  Cardiovascular: Normal rate, regular rhythm and intact distal pulses  Exam reveals no gallop and no friction rub  No murmur heard  Pulmonary/Chest: Effort normal and breath sounds normal  No respiratory distress  She has no wheezes  She has no rales  Abdominal: Soft  Bowel sounds are normal  She exhibits no distension and no mass  There is tenderness  There is no rebound and no guarding  Musculoskeletal: She exhibits no edema or tenderness  Lymphadenopathy:     She has no cervical adenopathy  Neurological: She is alert and oriented to person, place, and time  No cranial nerve deficit  Skin: Skin is warm and dry  No rash noted  No erythema  Incisions midline in the abdomen as well as bilateral paralumbar spine are covered with Steri-Strips but are without erythema or exudate   Psychiatric: She has a normal mood and affect   Her behavior is normal  Judgment and thought content normal

## 2018-04-04 DIAGNOSIS — E78.49 OTHER HYPERLIPIDEMIA: Primary | ICD-10-CM

## 2018-04-05 RX ORDER — ROSUVASTATIN CALCIUM 10 MG/1
TABLET, COATED ORAL
Qty: 30 TABLET | Refills: 4 | Status: SHIPPED | OUTPATIENT
Start: 2018-04-05 | End: 2018-08-26 | Stop reason: SDUPTHER

## 2018-04-11 DIAGNOSIS — F32.A DEPRESSION, UNSPECIFIED DEPRESSION TYPE: Primary | ICD-10-CM

## 2018-04-12 RX ORDER — SERTRALINE HYDROCHLORIDE 100 MG/1
TABLET, FILM COATED ORAL
Qty: 90 TABLET | Refills: 3 | Status: SHIPPED | OUTPATIENT
Start: 2018-04-12 | End: 2018-11-12 | Stop reason: SDUPTHER

## 2018-04-23 DIAGNOSIS — R73.01 IMPAIRED FASTING GLUCOSE: ICD-10-CM

## 2018-04-23 DIAGNOSIS — E78.5 HYPERLIPIDEMIA: ICD-10-CM

## 2018-07-08 DIAGNOSIS — O22.30 DVT (DEEP VEIN THROMBOSIS) IN PREGNANCY: Primary | ICD-10-CM

## 2018-07-09 RX ORDER — RIVAROXABAN 20 MG/1
TABLET, FILM COATED ORAL
Qty: 90 TABLET | Refills: 3 | Status: SHIPPED | OUTPATIENT
Start: 2018-07-09 | End: 2018-12-17 | Stop reason: SDUPTHER

## 2018-08-26 DIAGNOSIS — E78.49 OTHER HYPERLIPIDEMIA: ICD-10-CM

## 2018-08-27 RX ORDER — ROSUVASTATIN CALCIUM 10 MG/1
TABLET, COATED ORAL
Qty: 30 TABLET | Refills: 4 | Status: SHIPPED | OUTPATIENT
Start: 2018-08-27 | End: 2019-01-17 | Stop reason: SDUPTHER

## 2018-10-31 ENCOUNTER — OFFICE VISIT (OUTPATIENT)
Dept: DERMATOLOGY | Facility: CLINIC | Age: 48
End: 2018-10-31
Payer: MEDICARE

## 2018-10-31 DIAGNOSIS — Z13.89 SCREENING FOR SKIN CONDITION: ICD-10-CM

## 2018-10-31 DIAGNOSIS — Z85.828 HISTORY OF SKIN CANCER: ICD-10-CM

## 2018-10-31 DIAGNOSIS — L98.9 UNKNOWN SKIN LESION: Primary | ICD-10-CM

## 2018-10-31 DIAGNOSIS — H01.139 ECZEMATOUS DERMATITIS OF EYELID, UNSPECIFIED LATERALITY: ICD-10-CM

## 2018-10-31 DIAGNOSIS — F42.4 EXCORIATION (SKIN-PICKING) DISORDER: ICD-10-CM

## 2018-10-31 PROCEDURE — 99213 OFFICE O/P EST LOW 20 MIN: CPT | Performed by: DERMATOLOGY

## 2018-10-31 PROCEDURE — 88305 TISSUE EXAM BY PATHOLOGIST: CPT | Performed by: PATHOLOGY

## 2018-10-31 PROCEDURE — 11100 PR BIOPSY OF SKIN LESION: CPT | Performed by: DERMATOLOGY

## 2018-10-31 PROCEDURE — 88312 SPECIAL STAINS GROUP 1: CPT | Performed by: PATHOLOGY

## 2018-10-31 PROCEDURE — 11101 PR BIOPSY, EACH ADDED LESION: CPT | Performed by: DERMATOLOGY

## 2018-10-31 RX ORDER — KETOCONAZOLE 20 MG/G
CREAM TOPICAL DAILY
Qty: 30 G | Refills: 2 | Status: SHIPPED | OUTPATIENT
Start: 2018-10-31 | End: 2021-07-06 | Stop reason: CLARIF

## 2018-10-31 NOTE — PATIENT INSTRUCTIONS
Skin lesions await results of biopsy further treatment needed hopefully lesions will be amenable to curettage if positive  Eyelid dermatitis possible seborrheic dermatitis we discussed the potential of this being atopic related or just an eczematous irritant reaction will go ahead and try to treat this with ketoconazole cream to see if this will help  Excoriation and told patient difficult to assess underlying process and if there is something that is actually problem will be difficult to assess  History of skin cancer in no recurrence nothing else atypical sunblock recommended follow-up in 6 months  Screening for dermatologic disorders nothing else of concern noted on complete exam follow-up in 6 months  Wound care instructions given to patient

## 2018-10-31 NOTE — PROGRESS NOTES
[facility] DERMATOLOGY  [address] [place] [address]  [phone]  [phone]     MRN: [id number] : 1970  Encounter: [id number]  Patient Information: Will Doe  Chief complaint:  Skin rash on eyelids also irritated lesions on her skin with history of skin cancer    History of present illness:  57-year-old female who had not seen for 2 years with previous squamous cell carcinoma and actinic keratosis presents for overall checkup patient notably has some sores on her skin that does not resolve she does know she picks at lots of lesions and is concerned regarding a couple spots that do not seem to resolve    In addition the rash on the eyelids over a year and has not resolved even with use of steroid cream  Past Medical History:   Diagnosis Date    Arthritis     CRPS (complex regional pain syndrome)     [facility] thrombosis) ([facility])     Inflammatory spondylopathies ([address] )     Nonmelanoma skin cancer     LAST ASSESSED: [id number]    PE [facility]pulmonary thromboembolism) ([facility])     PONV (postoperative nausea and vomiting)     Squamous cell carcinoma     Urinary incontinence     Uterine leiomyoma     LAST ASSESSED: [id number]     Past Surgical History:   Procedure Laterality Date    ANKLE SURGERY      BACK SURGERY  2018    BELOW KNEE LEG AMPUTATION      LAST ASSESSED: [id number]     SECTION      HYSTERECTOMY      LAST ASSESSED: [id number]    OTHER SURGICAL HISTORY      SPINAL STEROTAXIS STIMULATION OF CORD; LAST ASSESSED: [id number]    LA LAP,RMV  ADNEXAL STRUCTURE N/A 11/10/2017    Procedure: LAPAROSCOPIC REMOVAL OF BILATERAL OVARIES;  LYSIS OF ADHESIONS;  Surgeon: Zach Glez MD;  Location: [facility];  Service: Gynecology    SINUS SURGERY       Social History   History   Alcohol Use No     Comment: occasional alcohol use as per all scripts      History   Drug Use No     History   Smoking Status    Never Smoker Smokeless Tobacco    Never Used     Family History   Problem Relation Age of Onset    Hypertension Mother     Heart disease Mother     Coronary artery disease Mother         Due to calcified coronary lesion     Diabetes Father         Type 2, controlled with neuropathy     Vitiligo Brother     Breast cancer Cousin     Breast cancer Cousin     Breast cancer Other     Vitiligo Daughter      Meds/Allergies   Allergies   Allergen Reactions    Penicillins Hives    Ciprofloxacin Rash    Codeine Itching and Rash    Morphine Itching and Rash       Meds:  Prior to Admission medications    Medication Sig Start Date End Date Taking?  Authorizing Provider   b complex vitamins capsule Take 1 capsule by mouth daily   Yes Historical Provider, MD   calcium carbonate (OS-APRYL) 600 MG tablet Take 600 mg by mouth 2 (two) times a day with meals   Yes Historical Provider, MD   Cholecalciferol (VITAMIN D3) 5000 units CAPS Take by mouth   Yes Historical Provider, MD   diazepam (VALIUM) 5 mg tablet TAKE 1 TABLET BY MOUTH EVERY 6 HOURS AS NEEDED FOR MUSCLE SPASM 3/9/18  Yes Historical Provider, MD   ferrous gluconate (FERGON) 324 mg tablet Take 1 tablet by mouth 2 (two) times a day with meals 3/12/18  Yes Historical Provider, MD   folic acid (FOLVITE) 1 mg tablet Take 2 tablets by mouth daily   Yes Historical Provider, MD   glucosamine-chondroitin 500-400 MG tablet Take 1 tablet by mouth 3 (three) times a day   Yes Historical Provider, MD   LORazepam (ATIVAN) 0 5 mg tablet Take by mouth   Yes Historical Provider, MD   methotrexate 2 5 mg tablet Take 2 5 mg by mouth once a week 6 pills weekly    Yes Historical Provider, MD   mometasone (NASONEX) 50 mcg/act nasal spray into each nostril 1/27/14  Yes Historical Provider, MD   NUCYNTA 50 MG tablet  2/14/18  Yes Historical Provider, MD   olopatadine HCl (PATADAY) 0 2 % opth drops Apply to eye 6/4/14  Yes Historical Provider, MD   omeprazole (PriLOSEC) 20 mg delayed release capsule Take 20 mg by mouth daily   Yes Historical Provider, MD   oxyCODONE-acetaminophen (PERCOCET) 5-325 mg per tablet Take 1 tablet by mouth Every 4 to 6 hours as needed for pain 3/9/18  Yes Historical Provider, MD   polyethylene glycol-propylene glycol (SYSTANE) 0 4-0 3 % Apply to eye   Yes Historical Provider, MD   predniSONE 20 mg tablet 2 TABLET ORAL DAILY DURING MEAL TAPER 5 MG PO X5 MH EVERY 5 DAYS 3/9/18  Yes Historical Provider, MD   Probiotic Product (PROBIOTIC-10) CAPS Take by mouth   Yes Historical Provider, MD   rivaroxaban (XARELTO) 10 mg tablet Take 10 mg by mouth daily   Yes Historical Provider, MD   rosuvastatin (CRESTOR) 10 MG tablet TAKE 1 TABLET BY MOUTH EVERY DAY 8/27/18  Yes BRAYDEN Quesada   sertraline (ZOLOFT) 100 mg tablet TAKE 1 TABLET DAILY 4/12/18  Yes BRAYDEN Quesada   sulfaSALAzine (AZULFIDINE) 500 mg tablet Take 500 mg by mouth 2 (two) times a day 3 tabs bid     Yes Historical Provider, MD   Tapentadol HCl (NUCYNTA PO) Take by mouth   Yes Historical Provider, MD   traMADol (ULTRAM) 50 mg tablet Take 1 tablet by mouth every 6 (six) hours as needed   Yes Historical Provider, MD   XARELTO 20 MG tablet TAKE ONE TABLET BY MOUTH EVERY DAY 7/9/18  Yes BRAYDEN Quesada       Subjective:     Review of Systems:    General: negative for - chills, fatigue, fever,  weight gain or weight loss  Psychological: negative for - anxiety, behavioral disorder, concentration difficulties, decreased libido, depression, irritability, memory difficulties, mood swings, sleep disturbances or suicidal ideation  ENT: negative for - hearing difficulties , nasal congestion, nasal discharge, oral lesions, sinus pain, sneezing, sore throat  Allergy and Immunology: negative for - hives, insect bite sensitivity,  Hematological and Lymphatic: negative for - bleeding problems, blood clots,bruising, swollen lymph nodes  Endocrine: negative for - hair pattern changes, hot flashes, malaise/lethargy, mood swings, palpitations, polydipsia/polyuria, skin changes, temperature intolerance or unexpected weight change  Respiratory: negative for - cough, hemoptysis, orthopnea, shortness of breath, or wheezing  Cardiovascular: negative for - chest pain, dyspnea on exertion, edema,  Gastrointestinal: negative for - abdominal pain, nausea/vomiting  Genito-Urinary: negative for - dysuria, incontinence, irregular/heavy menses or urinary frequency/urgency  Musculoskeletal: negative for - gait disturbance, joint pain, joint stiffness, joint swelling, muscle pain, muscular weakness  Dermatological:  As in HPI  Neurological: negative for confusion, dizziness, headaches, impaired coordination/balance, memory loss, numbness/tingling, seizures, speech problems, tremors or weakness       Objective: There were no vitals taken for this visit  Physical Exam:    General Appearance:    Alert, cooperative, no distress   Head:    Normocephalic, without obvious abnormality, atraumatic           Skin:   A full skin exam was performed including scalp, head scalp, eyes, ears, nose, lips, neck, chest, axilla, abdomen, back, buttocks, bilateral upper extremities, bilateral lower extremities, hands, feet, fingers, toes, fingernails, and toenails scaling erythematous area on the upper eyelid a numerous areas of excoriations noted face and elsewhere some postinflammatory erythema the 1 she is pointing out to which she feels is different is a 4 mm red papule on left forearm for 3 mm red papule on the right shoulder        Shave Biopsy Procedure Note    Pre-operative Diagnosis:  Squamous cell cancer versus prurigo nodularis    Plan:  1  Instructed to keep the wound dry and covered for 24 and clean thereafter  2  Warning signs of infection were reviewed  3  Recommended that the patient use OTC acetaminophen as needed for pain     4  Return  Pending results of biopsy(ies)    Locations:Right shoulder and left forearm    Indications:  Nonhealing lesion    Anesthesia: Lidocaine 1% without epinephrine without added sodium bicarbonate    Procedure Details     Patient informed of the risks (including bleeding and infection) and benefits of the   procedure and Verbal informed consent obtained  The lesion and surrounding area were given a sterile prep using alcohol and draped in the usual sterile fashion  A Blue blade razor was used to obtain a specimen  Hemostasis achieved with aluminum chloride  Petrolatum and a sterile dressing applied  The specimen was sent for pathologic examination  The patient tolerated the procedure(s) well  Complications:  none  Assessment:     1  Unknown skin lesion     2  Excoriation (skin-picking) disorder     3  Eczematous dermatitis of eyelid, unspecified laterality     4  Screening for skin condition     5  History of skin cancer           Plan:   Skin lesions await results of biopsy further treatment needed hopefully lesions will be amenable to curettage if positive  Eyelid dermatitis possible seborrheic dermatitis we discussed the potential of this being atopic related or just an eczematous irritant reaction will go ahead and try to treat this with ketoconazole cream to see if this will help  Excoriation and told patient difficult to assess underlying process and if there is something that is actually problem will be difficult to assess  History of skin cancer in no recurrence nothing else atypical sunblock recommended follow-up in 6 months  Screening for dermatologic disorders nothing else of concern noted on complete exam follow-up in 6 months    Lindsay Thomas MD  10/31/2018,2:38 PM    Portions of the record may have been created with voice recognition software   Occasional wrong word or "sound a like" substitutions may have occurred due to the inherent limitations of voice recognition software   Read the chart carefully and recognize, using context, where substitutions have occurred

## 2018-11-01 ENCOUNTER — APPOINTMENT (OUTPATIENT)
Dept: LAB | Facility: CLINIC | Age: 48
End: 2018-11-01
Payer: MEDICARE

## 2018-11-01 ENCOUNTER — OFFICE VISIT (OUTPATIENT)
Dept: INTERNAL MEDICINE CLINIC | Facility: CLINIC | Age: 48
End: 2018-11-01
Payer: MEDICARE

## 2018-11-01 VITALS
DIASTOLIC BLOOD PRESSURE: 84 MMHG | BODY MASS INDEX: 42.06 KG/M2 | HEIGHT: 63 IN | SYSTOLIC BLOOD PRESSURE: 152 MMHG | HEART RATE: 76 BPM | WEIGHT: 237.4 LBS

## 2018-11-01 DIAGNOSIS — R73.01 IMPAIRED FASTING BLOOD SUGAR: ICD-10-CM

## 2018-11-01 DIAGNOSIS — E78.5 HYPERLIPIDEMIA: ICD-10-CM

## 2018-11-01 DIAGNOSIS — R73.01 IMPAIRED FASTING GLUCOSE: ICD-10-CM

## 2018-11-01 DIAGNOSIS — R53.83 OTHER FATIGUE: ICD-10-CM

## 2018-11-01 DIAGNOSIS — E78.5 HYPERLIPIDEMIA, UNSPECIFIED HYPERLIPIDEMIA TYPE: ICD-10-CM

## 2018-11-01 DIAGNOSIS — M35.9 AUTOIMMUNE DISEASE (HCC): ICD-10-CM

## 2018-11-01 DIAGNOSIS — D64.9 ANEMIA, UNSPECIFIED TYPE: ICD-10-CM

## 2018-11-01 DIAGNOSIS — D64.9 ANEMIA, UNSPECIFIED TYPE: Primary | ICD-10-CM

## 2018-11-01 DIAGNOSIS — M46.97 INFLAMMATORY SPONDYLOPATHY OF LUMBOSACRAL REGION (HCC): ICD-10-CM

## 2018-11-01 DIAGNOSIS — G90.50 RSD (REFLEX SYMPATHETIC DYSTROPHY): ICD-10-CM

## 2018-11-01 LAB
ALBUMIN SERPL BCP-MCNC: 3.8 G/DL (ref 3.5–5)
ALP SERPL-CCNC: 72 U/L (ref 46–116)
ALT SERPL W P-5'-P-CCNC: 33 U/L (ref 12–78)
ANION GAP SERPL CALCULATED.3IONS-SCNC: 6 MMOL/L (ref 4–13)
AST SERPL W P-5'-P-CCNC: 22 U/L (ref 5–45)
BILIRUB SERPL-MCNC: 0.18 MG/DL (ref 0.2–1)
BUN SERPL-MCNC: 14 MG/DL (ref 5–25)
CALCIUM SERPL-MCNC: 9.4 MG/DL (ref 8.3–10.1)
CHLORIDE SERPL-SCNC: 106 MMOL/L (ref 100–108)
CHOLEST SERPL-MCNC: 198 MG/DL (ref 50–200)
CO2 SERPL-SCNC: 27 MMOL/L (ref 21–32)
CREAT SERPL-MCNC: 0.75 MG/DL (ref 0.6–1.3)
ERYTHROCYTE [DISTWIDTH] IN BLOOD BY AUTOMATED COUNT: 17.3 % (ref 11.6–15.1)
EST. AVERAGE GLUCOSE BLD GHB EST-MCNC: 131 MG/DL
FERRITIN SERPL-MCNC: 6 NG/ML (ref 8–388)
GFR SERPL CREATININE-BSD FRML MDRD: 93 ML/MIN/1.73SQ M
GLUCOSE SERPL-MCNC: 125 MG/DL (ref 65–140)
HBA1C MFR BLD: 6.2 % (ref 4.2–6.3)
HCT VFR BLD AUTO: 37.1 % (ref 34.8–46.1)
HDLC SERPL-MCNC: 50 MG/DL (ref 40–60)
HGB BLD-MCNC: 11.1 G/DL (ref 11.5–15.4)
IRON SATN MFR SERPL: 7 %
IRON SERPL-MCNC: 25 UG/DL (ref 50–170)
LDLC SERPL CALC-MCNC: 80 MG/DL (ref 0–100)
MCH RBC QN AUTO: 22.8 PG (ref 26.8–34.3)
MCHC RBC AUTO-ENTMCNC: 29.9 G/DL (ref 31.4–37.4)
MCV RBC AUTO: 76 FL (ref 82–98)
NONHDLC SERPL-MCNC: 148 MG/DL
PLATELET # BLD AUTO: 265 THOUSANDS/UL (ref 149–390)
PMV BLD AUTO: 11.7 FL (ref 8.9–12.7)
POTASSIUM SERPL-SCNC: 4.1 MMOL/L (ref 3.5–5.3)
PROT SERPL-MCNC: 7.7 G/DL (ref 6.4–8.2)
RBC # BLD AUTO: 4.86 MILLION/UL (ref 3.81–5.12)
SODIUM SERPL-SCNC: 139 MMOL/L (ref 136–145)
TIBC SERPL-MCNC: 352 UG/DL (ref 250–450)
TRIGL SERPL-MCNC: 339 MG/DL
TSH SERPL DL<=0.05 MIU/L-ACNC: 1.62 UIU/ML (ref 0.36–3.74)
WBC # BLD AUTO: 6.81 THOUSAND/UL (ref 4.31–10.16)

## 2018-11-01 PROCEDURE — 82728 ASSAY OF FERRITIN: CPT

## 2018-11-01 PROCEDURE — 83036 HEMOGLOBIN GLYCOSYLATED A1C: CPT

## 2018-11-01 PROCEDURE — 85027 COMPLETE CBC AUTOMATED: CPT

## 2018-11-01 PROCEDURE — 84443 ASSAY THYROID STIM HORMONE: CPT

## 2018-11-01 PROCEDURE — 99214 OFFICE O/P EST MOD 30 MIN: CPT | Performed by: NURSE PRACTITIONER

## 2018-11-01 PROCEDURE — 80061 LIPID PANEL: CPT

## 2018-11-01 PROCEDURE — 80053 COMPREHEN METABOLIC PANEL: CPT

## 2018-11-01 PROCEDURE — 83540 ASSAY OF IRON: CPT

## 2018-11-01 PROCEDURE — 83550 IRON BINDING TEST: CPT

## 2018-11-01 PROCEDURE — 36415 COLL VENOUS BLD VENIPUNCTURE: CPT

## 2018-11-01 RX ORDER — PREDNISONE 1 MG/1
5 TABLET ORAL DAILY
COMMUNITY
End: 2018-11-26

## 2018-11-01 RX ORDER — LEFLUNOMIDE 10 MG/1
20 TABLET ORAL DAILY
COMMUNITY
End: 2018-12-17 | Stop reason: ALTCHOICE

## 2018-11-01 NOTE — PROGRESS NOTES
Assessment/Plan:    Patient Instructions   Anemia, question if this is iron deficiency or anemia of chronic disease, will check iron panel and stool test   I did discuss with the patient that if her stool test is positive that she would need colonoscopy EGD in capsule she is concerned about the prep in relationship to her RSD and her hypercoagulable state  She is high risk for recurrence of DVT off anticoagulant  I discussed with her that we can have this consultation with GI after tests are reviewed  Inflammatory spondylopathy following with Rheumatology         Diagnoses and all orders for this visit:    Anemia, unspecified type  -     Iron Panel; Future  -     Ferritin; Future  -     Occult Blood, Fecal Immunochemical; Future    Hyperlipidemia, unspecified hyperlipidemia type  -     Lipid panel; Future    Other fatigue  -     TSH, 3rd generation; Future    Impaired fasting blood sugar  -     Hemoglobin A1C; Future    Other orders  -     leflunomide (ARAVA) 10 MG tablet; Take 20 mg by mouth daily  -     predniSONE 5 mg tablet; Take 5 mg by mouth daily         Subjective:      Patient ID: Brandie Pino is a 46 y o  female    Patient was seen by Rheumatology today notified that she was anemic  She is not recall being told this in the past except for after her surgery when she became anemic and needed a blood transfusion  She does have inflammatory arthropathy she is currently on methotrexate prednisone and Rheumatology starting her on a new medication    Sometimes when she has a bowel movement she feels like the stools are are and but she can't tell if it is blood or just a odd colored stool    She has never had colonoscopy she is concerned because of her RSD the prep may trigger her symptoms  Additionally she is high risk for DVT coming off of her anticoagulant            Current Outpatient Prescriptions:     b complex vitamins capsule, Take 1 capsule by mouth daily, Disp: , Rfl:     calcium carbonate (OS-APRYL) 600 MG tablet, Take 600 mg by mouth 2 (two) times a day with meals, Disp: , Rfl:     Cholecalciferol (VITAMIN D3) 5000 units CAPS, Take by mouth, Disp: , Rfl:     diazepam (VALIUM) 5 mg tablet, TAKE 1 TABLET BY MOUTH EVERY 6 HOURS AS NEEDED FOR MUSCLE SPASM, Disp: , Rfl: 0    ferrous gluconate (FERGON) 324 mg tablet, Take 1 tablet by mouth 2 (two) times a day with meals, Disp: , Rfl: 0    folic acid (FOLVITE) 1 mg tablet, Take 2 tablets by mouth daily, Disp: , Rfl:     glucosamine-chondroitin 500-400 MG tablet, Take 1 tablet by mouth 3 (three) times a day, Disp: , Rfl:     ketoconazole (NIZORAL) 2 % cream, Apply topically daily, Disp: 30 g, Rfl: 2    leflunomide (ARAVA) 10 MG tablet, Take 20 mg by mouth daily, Disp: , Rfl:     LORazepam (ATIVAN) 0 5 mg tablet, Take by mouth, Disp: , Rfl:     methotrexate 2 5 mg tablet, Take 2 5 mg by mouth once a week 6 pills weekly , Disp: , Rfl:     mometasone (NASONEX) 50 mcg/act nasal spray, into each nostril, Disp: , Rfl:     NUCYNTA 50 MG tablet, , Disp: , Rfl:     olopatadine HCl (PATADAY) 0 2 % opth drops, Apply to eye, Disp: , Rfl:     omeprazole (PriLOSEC) 20 mg delayed release capsule, Take 20 mg by mouth daily, Disp: , Rfl:     oxyCODONE-acetaminophen (PERCOCET) 5-325 mg per tablet, Take 1 tablet by mouth Every 4 to 6 hours as needed for pain, Disp: , Rfl: 0    polyethylene glycol-propylene glycol (SYSTANE) 0 4-0 3 %, Apply to eye, Disp: , Rfl:     predniSONE 5 mg tablet, Take 5 mg by mouth daily, Disp: , Rfl:     Probiotic Product (PROBIOTIC-10) CAPS, Take by mouth, Disp: , Rfl:     rivaroxaban (XARELTO) 10 mg tablet, Take 10 mg by mouth daily, Disp: , Rfl:     rosuvastatin (CRESTOR) 10 MG tablet, TAKE 1 TABLET BY MOUTH EVERY DAY, Disp: 30 tablet, Rfl: 4    sertraline (ZOLOFT) 100 mg tablet, TAKE 1 TABLET DAILY, Disp: 90 tablet, Rfl: 3    sulfaSALAzine (AZULFIDINE) 500 mg tablet, Take 500 mg by mouth 2 (two) times a day 3 tabs bid  , Disp: , Rfl:     Tapentadol HCl (NUCYNTA PO), Take by mouth, Disp: , Rfl:     traMADol (ULTRAM) 50 mg tablet, Take 1 tablet by mouth every 6 (six) hours as needed, Disp: , Rfl:     XARELTO 20 MG tablet, TAKE ONE TABLET BY MOUTH EVERY DAY, Disp: 90 tablet, Rfl: 3    predniSONE 20 mg tablet, 2 TABLET ORAL DAILY DURING MEAL TAPER 5 MG PO X5 MH EVERY 5 DAYS, Disp: , Rfl: 0    No results found for this or any previous visit (from the past 1008 hour(s))  The following portions of the patient's history were reviewed and updated as appropriate: allergies, current medications, past family history, past medical history, past social history, past surgical history and problem list      Review of Systems   Constitutional: Negative for appetite change, chills, diaphoresis, fatigue, fever and unexpected weight change  HENT: Negative for postnasal drip and sneezing  Eyes: Negative for visual disturbance  Respiratory: Negative for chest tightness and shortness of breath  Cardiovascular: Negative for chest pain, palpitations and leg swelling  Gastrointestinal: Negative for abdominal pain and blood in stool  Endocrine: Negative for cold intolerance, heat intolerance, polydipsia, polyphagia and polyuria  Genitourinary: Negative for difficulty urinating, dysuria, frequency and urgency  Musculoskeletal: Negative for arthralgias and myalgias  Skin: Negative for rash and wound  Neurological: Negative for dizziness, weakness, light-headedness and headaches  Hematological: Negative for adenopathy  Psychiatric/Behavioral: Negative for confusion, dysphoric mood and sleep disturbance  The patient is not nervous/anxious  Objective:      /84 (BP Location: Left arm, Patient Position: Sitting)   Pulse 76   Ht 5' 3" (1 6 m)   Wt 108 kg (237 lb 6 4 oz)   BMI 42 05 kg/m²        Physical Exam   Constitutional: She is oriented to person, place, and time  She appears well-developed  No distress     HENT: Head: Normocephalic and atraumatic  Nose: Nose normal    Mouth/Throat: Oropharynx is clear and moist    Eyes: Pupils are equal, round, and reactive to light  Conjunctivae and EOM are normal    Neck: Normal range of motion  Neck supple  No JVD present  No tracheal deviation present  No thyromegaly present  Cardiovascular: Normal rate, regular rhythm, normal heart sounds and intact distal pulses  Exam reveals no gallop and no friction rub  No murmur heard  Pulmonary/Chest: Effort normal and breath sounds normal  No respiratory distress  She has no wheezes  She has no rales  Abdominal: Soft  Bowel sounds are normal  She exhibits no distension  There is no tenderness  Musculoskeletal: Normal range of motion  She exhibits no edema  Lymphadenopathy:     She has no cervical adenopathy  Neurological: She is alert and oriented to person, place, and time  No cranial nerve deficit  Skin: Skin is warm and dry  No rash noted  She is not diaphoretic  Psychiatric: She has a normal mood and affect   Her behavior is normal  Judgment and thought content normal

## 2018-11-01 NOTE — PATIENT INSTRUCTIONS
Anemia, question if this is iron deficiency or anemia of chronic disease, will check iron panel and stool test   I did discuss with the patient that if her stool test is positive that she would need colonoscopy EGD in capsule she is concerned about the prep in relationship to her RSD and her hypercoagulable state  She is high risk for recurrence of DVT off anticoagulant  I discussed with her that we can have this consultation with GI after tests are reviewed      Inflammatory spondylopathy following with Rheumatology    Labs consistent w/ [facility]- referred to GI for egd/colo/capsule

## 2018-11-02 DIAGNOSIS — D50.9 IRON DEFICIENCY ANEMIA, UNSPECIFIED IRON DEFICIENCY ANEMIA TYPE: Primary | ICD-10-CM

## 2018-11-12 ENCOUNTER — OFFICE VISIT (OUTPATIENT)
Dept: INTERNAL MEDICINE CLINIC | Facility: CLINIC | Age: 48
End: 2018-11-12
Payer: MEDICARE

## 2018-11-12 ENCOUNTER — LAB (OUTPATIENT)
Dept: LAB | Facility: CLINIC | Age: 48
End: 2018-11-12
Payer: MEDICARE

## 2018-11-12 VITALS
RESPIRATION RATE: 18 BRPM | TEMPERATURE: 98.6 F | HEART RATE: 104 BPM | DIASTOLIC BLOOD PRESSURE: 78 MMHG | BODY MASS INDEX: 41.96 KG/M2 | HEIGHT: 63 IN | SYSTOLIC BLOOD PRESSURE: 130 MMHG | WEIGHT: 236.8 LBS

## 2018-11-12 DIAGNOSIS — D64.9 ANEMIA, UNSPECIFIED TYPE: ICD-10-CM

## 2018-11-12 DIAGNOSIS — J18.9 PNEUMONIA DUE TO INFECTIOUS ORGANISM, UNSPECIFIED LATERALITY, UNSPECIFIED PART OF LUNG: Primary | ICD-10-CM

## 2018-11-12 DIAGNOSIS — F32.A DEPRESSION, UNSPECIFIED DEPRESSION TYPE: ICD-10-CM

## 2018-11-12 PROBLEM — D50.0 IRON DEFICIENCY ANEMIA DUE TO CHRONIC BLOOD LOSS: Status: ACTIVE | Noted: 2018-11-12

## 2018-11-12 LAB — HEMOCCULT STL QL IA: POSITIVE

## 2018-11-12 PROCEDURE — 99214 OFFICE O/P EST MOD 30 MIN: CPT | Performed by: NURSE PRACTITIONER

## 2018-11-12 PROCEDURE — G0328 FECAL BLOOD SCRN IMMUNOASSAY: HCPCS

## 2018-11-12 RX ORDER — PREDNISONE 10 MG/1
TABLET ORAL
Qty: 18 TABLET | Refills: 0 | Status: SHIPPED | OUTPATIENT
Start: 2018-11-12 | End: 2018-11-26 | Stop reason: SDUPTHER

## 2018-11-12 RX ORDER — ALBUTEROL SULFATE 90 UG/1
2 AEROSOL, METERED RESPIRATORY (INHALATION) EVERY 6 HOURS PRN
Qty: 8.5 G | Refills: 0 | Status: SHIPPED | OUTPATIENT
Start: 2018-11-12 | End: 2018-12-17

## 2018-11-12 RX ORDER — SERTRALINE HYDROCHLORIDE 100 MG/1
TABLET, FILM COATED ORAL
Qty: 30 TABLET | Refills: 0 | Status: SHIPPED | OUTPATIENT
Start: 2018-11-12 | End: 2019-02-09 | Stop reason: SDUPTHER

## 2018-11-12 RX ORDER — AZITHROMYCIN 250 MG/1
TABLET, FILM COATED ORAL
Qty: 6 TABLET | Refills: 0 | Status: SHIPPED | OUTPATIENT
Start: 2018-11-12 | End: 2018-11-17

## 2018-11-12 RX ORDER — BUPROPION HYDROCHLORIDE 150 MG/1
150 TABLET ORAL DAILY
Qty: 30 TABLET | Refills: 1 | Status: SHIPPED | OUTPATIENT
Start: 2018-11-12 | End: 2018-12-17 | Stop reason: SDUPTHER

## 2018-11-12 NOTE — PROGRESS NOTES
Assessment/Plan:    Patient Instructions   Acute bronchitis vs early pneumonia- Recommend prednisone 30mg x 3 days then 20mg x 3 days then 15mg x 5 days then 10 x 5 days then 5mg x 5 days  Also start antibiotics  Continue probiotics  She had been on a taper by Rheumatology she will start with my taper and then switched to there is mid way     Inhaler also sent in    Depression start Wellbutrin 150 in the morning cut Zoloft down to 50 in the evening follow-up in 1 month    Iron deficiency anemia following with Dr Brenden Ho in December         Diagnoses and all orders for this visit:    Pneumonia due to infectious organism, unspecified laterality, unspecified part of lung  -     azithromycin (ZITHROMAX) 250 mg tablet; Take 2 tabs today then 1 tab each additional day until complete  -     predniSONE 10 mg tablet; Take 3 tablets for 3 days then 2 tablets for 3 days then 1 tablet for 3 days  -     albuterol (PROAIR HFA) 90 mcg/act inhaler; Inhale 2 puffs every 6 (six) hours as needed for wheezing    Depression, unspecified depression type  -     buPROPion (WELLBUTRIN XL) 150 mg 24 hr tablet; Take 1 tablet (150 mg total) by mouth daily  -     sertraline (ZOLOFT) 100 mg tablet; Take 1/2 tab daily         Subjective:      Patient ID: Axel Killian is a 46 y o  female    Got her flu shot and then the next day started to not feel well she thinks it was just a coincidence  She started with nasal congestion postnasal drip now she has heaviness in the chest productive cough for a white mucus she feels fatigued chills wheeze and shortness of breath  She using over-the-counter cough medicine it is not helping  She is profoundly depressed  She states she just walks in here with a happy face  She cries all the time  Has no motivation            Current Outpatient Prescriptions:     b complex vitamins capsule, Take 1 capsule by mouth daily, Disp: , Rfl:     calcium carbonate (OS-APRYL) 600 MG tablet, Take 600 mg by mouth 2 (two) times a day with meals, Disp: , Rfl:     Cholecalciferol (VITAMIN D3) 5000 units CAPS, Take by mouth, Disp: , Rfl:     diazepam (VALIUM) 5 mg tablet, TAKE 1 TABLET BY MOUTH EVERY 6 HOURS AS NEEDED FOR MUSCLE SPASM, Disp: , Rfl: 0    folic acid (FOLVITE) 1 mg tablet, Take 3 tablets by mouth daily  , Disp: , Rfl:     glucosamine-chondroitin 500-400 MG tablet, Take 1 tablet by mouth 3 (three) times a day, Disp: , Rfl:     ketoconazole (NIZORAL) 2 % cream, Apply topically daily, Disp: 30 g, Rfl: 2    leflunomide (ARAVA) 10 MG tablet, Take 20 mg by mouth daily, Disp: , Rfl:     LORazepam (ATIVAN) 0 5 mg tablet, Take by mouth, Disp: , Rfl:     methotrexate 2 5 mg tablet, Take 2 5 mg by mouth once a week 6 pills weekly , Disp: , Rfl:     NUCYNTA 50 MG tablet, , Disp: , Rfl:     olopatadine HCl (PATADAY) 0 2 % opth drops, Apply to eye, Disp: , Rfl:     omeprazole (PriLOSEC) 20 mg delayed release capsule, Take 20 mg by mouth daily, Disp: , Rfl:     predniSONE 20 mg tablet, 2 TABLET ORAL DAILY DURING MEAL TAPER 5 MG PO X5 MH EVERY 5 DAYS, Disp: , Rfl: 0    rivaroxaban (XARELTO) 10 mg tablet, Take 10 mg by mouth daily, Disp: , Rfl:     rosuvastatin (CRESTOR) 10 MG tablet, TAKE 1 TABLET BY MOUTH EVERY DAY, Disp: 30 tablet, Rfl: 4    sertraline (ZOLOFT) 100 mg tablet, Take 1/2 tab daily, Disp: 30 tablet, Rfl: 0    sulfaSALAzine (AZULFIDINE) 500 mg tablet, Take 500 mg by mouth 2 (two) times a day 3 tabs bid  , Disp: , Rfl:     Tapentadol HCl (NUCYNTA PO), Take by mouth, Disp: , Rfl:     traMADol (ULTRAM) 50 mg tablet, Take 1 tablet by mouth every 6 (six) hours as needed, Disp: , Rfl:     XARELTO 20 MG tablet, TAKE ONE TABLET BY MOUTH EVERY DAY, Disp: 90 tablet, Rfl: 3    albuterol (PROAIR HFA) 90 mcg/act inhaler, Inhale 2 puffs every 6 (six) hours as needed for wheezing, Disp: 8 5 g, Rfl: 0    azithromycin (ZITHROMAX) 250 mg tablet, Take 2 tabs today then 1 tab each additional day until complete, Disp: 6 tablet, Rfl: 0    buPROPion (WELLBUTRIN XL) 150 mg 24 hr tablet, Take 1 tablet (150 mg total) by mouth daily, Disp: 30 tablet, Rfl: 1    ferrous gluconate (FERGON) 324 mg tablet, Take 1 tablet by mouth 2 (two) times a day with meals, Disp: , Rfl: 0    mometasone (NASONEX) 50 mcg/act nasal spray, into each nostril, Disp: , Rfl:     oxyCODONE-acetaminophen (PERCOCET) 5-325 mg per tablet, Take 1 tablet by mouth Every 4 to 6 hours as needed for pain, Disp: , Rfl: 0    polyethylene glycol-propylene glycol (SYSTANE) 0 4-0 3 %, Apply to eye, Disp: , Rfl:     predniSONE 10 mg tablet, Take 3 tablets for 3 days then 2 tablets for 3 days then 1 tablet for 3 days, Disp: 18 tablet, Rfl: 0    predniSONE 5 mg tablet, Take 5 mg by mouth daily, Disp: , Rfl:     Probiotic Product (PROBIOTIC-10) CAPS, Take by mouth, Disp: , Rfl:     Recent Results (from the past 1008 hour(s))   Tissue Exam    Collection Time: 10/31/18  2:44 PM   Result Value Ref Range    Case Report       Surgical Pathology Report                         Case: [id number]                                   Authorizing Provider:  Lala Sal MD          Collected:           10/31/2018 1444              Ordering Location:     [facility]           Received:            10/31/2018 [facility]                                                           Pathologist:           Marilee Bland MD                                                             Specimens:   A) - Arm, Left, L forearm                                                                           B) - Arm, Right, R upper arm                                                               Addendum       Special stain for fungus (PAS) is performed on specimen B and negative  A copy of the addendum is faxed to Dr Elgin Skaggs on 11/7/18 @ 7478 hours  Final Diagnosis       A  Skin Arm, Left, Left forearm, shave biopsy:  - Benign verrucous keratosis  - Focal epidermolytic hyperkeratosis  - Negative for malignancy  B  Skin Arm, Right, Right upper arm, shave biopsy:  - Benign irritated keratosis with features of prurigo nodularis and excoriation  See Note  - Special stain for fungus (PAS) pending; results will be reported in an addendum    - Negative for malignancy  Note: The chronic stage of an eczematous dermatitis cannot be completely excluded  Interpretation performed at [facility], [address] [place] [address]  Additional Information       All controls performed with the immunohistochemical stains reported above reacted appropriately  These tests were developed and their performance characteristics determined by [facility] or [facility]  They may not be cleared or approved by the [place]  Food and Drug Administration  The FDA has determined that such clearance or approval is not necessary  These tests are used for clinical purposes  They should not be regarded as investigational or for research  This laboratory has been approved by [place] [id number], designated as a high-complexity laboratory and is qualified to perform these tests  Gross Description       A  The specimen is received in formalin, labeled with the patient's name and hospital number, and is designated "left forearm", is a shave biopsy of tan-white, roughened, and slightly raised skin measuring 0 7 x 0 6 x 0 1 cm  The apparent margin of resection is inked green  The specimen is trisected along the short axis and entirely submitted 1 cassette  B  The specimen is received in formalin, labeled with the patient's name and hospital number, and is designated "right upper arm, is a shave biopsy of tan-white, roughened, and slightly raised skin measuring 0 8 x 0 5 x 0 1 cm  The apparent margin of resection is inked green    The specimen is trisected along the short axis and entirely submitted 1 cassette  Note: The estimated total formalin fixation time based upon information provided by the submitting clinician and the standard processing schedule is under 72 hours    RRavotti            Clinical Information       r/o scc[shave x2  Squamous cell cancer versus prurigo nodularis   CBC    Collection Time: 11/01/18  4:09 PM   Result Value Ref Range    WBC 6 81 4 31 - 10 16 Thousand/uL    RBC 4 86 3 81 - 5 12 Million/uL    Hemoglobin 11 1 (L) 11 5 - 15 4 g/dL    Hematocrit 37 1 34 8 - 46 1 %    MCV 76 (L) 82 - 98 fL    MCH 22 8 (L) 26 8 - 34 3 pg    MCHC 29 9 (L) 31 4 - 37 4 g/dL    RDW 17 3 (H) 11 6 - 15 1 %    Platelets 796 210 - 242 Thousands/uL    MPV 11 7 8 9 - 12 7 fL   Comprehensive metabolic panel    Collection Time: 11/01/18  4:09 PM   Result Value Ref Range    Sodium 139 136 - 145 mmol/L    Potassium 4 1 3 5 - 5 3 mmol/L    Chloride 106 100 - 108 mmol/L    CO2 27 21 - 32 mmol/L    ANION GAP 6 4 - 13 mmol/L    BUN 14 5 - 25 mg/dL    Creatinine 0 75 0 60 - 1 30 mg/dL    Glucose 125 65 - 140 mg/dL    Calcium 9 4 8 3 - 10 1 mg/dL    AST 22 5 - 45 U/L    ALT 33 12 - 78 U/L    Alkaline Phosphatase 72 46 - 116 U/L    Total Protein 7 7 6 4 - 8 2 g/dL    Albumin 3 8 3 5 - 5 0 g/dL    Total Bilirubin 0 18 (L) 0 20 - 1 00 mg/dL    eGFR 93 ml/min/1 73sq m   Hemoglobin A1c    Collection Time: 11/01/18  4:09 PM   Result Value Ref Range    Hemoglobin A1C 6 2 4 2 - 6 3 %     mg/dl   Lipid panel    Collection Time: 11/01/18  4:09 PM   Result Value Ref Range    Cholesterol 198 50 - 200 mg/dL    Triglycerides 339 (H) <=150 mg/dL    HDL, Direct 50 40 - 60 mg/dL    LDL Calculated 80 0 - 100 mg/dL    Non-HDL-Chol (CHOL-HDL) 148 mg/dl   TSH, 3rd generation    Collection Time: 11/01/18  4:09 PM   Result Value Ref Range    TSH 3RD GENERATON 1 620 0 358 - 3 740 uIU/mL   Iron Saturation %    Collection Time: 11/01/18  4:09 PM   Result Value Ref Range Iron Saturation 7 %    TIBC 352 250 - 450 ug/dL    Iron 25 (L) 50 - 170 ug/dL   Ferritin    Collection Time: 11/01/18  4:09 PM   Result Value Ref Range    Ferritin 6 (L) 8 - 388 ng/mL       The following portions of the patient's history were reviewed and updated as appropriate: allergies, current medications, past family history, past medical history, past social history, past surgical history and problem list      Review of Systems   Constitutional: Positive for fatigue  Negative for appetite change, chills, diaphoresis, fever and unexpected weight change  HENT: Positive for postnasal drip  Negative for sneezing  Eyes: Negative for visual disturbance  Respiratory: Positive for cough, shortness of breath and wheezing  Negative for chest tightness  Cardiovascular: Negative for chest pain, palpitations and leg swelling  Gastrointestinal: Negative for abdominal pain and blood in stool  Endocrine: Negative for cold intolerance, heat intolerance, polydipsia, polyphagia and polyuria  Genitourinary: Negative for difficulty urinating, dysuria, frequency and urgency  Musculoskeletal: Negative for arthralgias and myalgias  Skin: Negative for rash and wound  Neurological: Negative for dizziness, weakness, light-headedness and headaches  Hematological: Negative for adenopathy  Psychiatric/Behavioral: Positive for decreased concentration  Negative for confusion, dysphoric mood and sleep disturbance  The patient is not nervous/anxious  Objective:      /78   Pulse 104   Temp 98 6 °F (37 °C)   Resp 18   Ht 5' 3" (1 6 m)   Wt 107 kg (236 lb 12 8 oz)   BMI 41 95 kg/m²        Physical Exam   Constitutional: She is oriented to person, place, and time  She appears well-developed  No distress  HENT:   Head: Normocephalic and atraumatic  Nose: Nose normal    Mouth/Throat: Oropharynx is clear and moist    Eyes: Pupils are equal, round, and reactive to light   Conjunctivae and EOM are normal    Neck: Normal range of motion  Neck supple  No JVD present  No tracheal deviation present  No thyromegaly present  Cardiovascular: Normal rate, regular rhythm, normal heart sounds and intact distal pulses  Exam reveals no gallop and no friction rub  No murmur heard  Pulmonary/Chest: Effort normal and breath sounds normal  No respiratory distress  She has no wheezes  She has no rales  Crackle right upper wheeze right lower   Abdominal: Soft  Bowel sounds are normal  She exhibits no distension  There is no tenderness  Musculoskeletal: Normal range of motion  She exhibits no edema  Lymphadenopathy:     She has no cervical adenopathy  Neurological: She is alert and oriented to person, place, and time  No cranial nerve deficit  Skin: Skin is warm and dry  No rash noted  She is not diaphoretic  Psychiatric: She has a normal mood and affect   Her behavior is normal  Judgment and thought content normal

## 2018-11-12 NOTE — PATIENT INSTRUCTIONS
Acute bronchitis vs early pneumonia- Recommend prednisone 30mg x 3 days then 20mg x 3 days then 15mg x 5 days then 10 x 5 days then 5mg x 5 days  Also start antibiotics  Continue probiotics  She had been on a taper by Rheumatology she will start with my taper and then switched to there is mid way     Inhaler also sent in    Depression start Wellbutrin 150 in the morning cut Zoloft down to 50 in the evening follow-up in 1 month    Iron deficiency anemia following with Dr Marta Leon in December

## 2018-11-13 ENCOUNTER — TELEPHONE (OUTPATIENT)
Dept: INTERNAL MEDICINE CLINIC | Facility: CLINIC | Age: 48
End: 2018-11-13

## 2018-11-13 ENCOUNTER — TELEPHONE (OUTPATIENT)
Dept: DERMATOLOGY | Facility: CLINIC | Age: 48
End: 2018-11-13

## 2018-11-13 NOTE — TELEPHONE ENCOUNTER
----- Message from Jessica Knowles, [address] sent at 11/13/2018  8:02 AM EST -----  Let her know that her stool test was positive for blood   She already has appt w/ dr Ivelisse Blank so we just have to see what he says

## 2018-11-13 NOTE — PROGRESS NOTES
Let her know that her stool test was positive for blood   She already has appt w/ dr Ivelisse Blank so we just have to see what he says

## 2018-11-13 NOTE — TELEPHONE ENCOUNTER
----- Message from Wilmer Caballero MD sent at 11/5/2018  3:32 PM EST -----  Please call the patient regarding her abnormal result   Wart  And pickers nodule no treat ment needed

## 2018-11-13 NOTE — TELEPHONE ENCOUNTER
----- Message from Rosi Bronson MD sent at 11/5/2018  3:32 PM EST -----  Please call the patient regarding her abnormal result   Wart  And pickers nodule no treat ment needed

## 2018-11-14 ENCOUNTER — TELEPHONE (OUTPATIENT)
Dept: INTERNAL MEDICINE CLINIC | Facility: CLINIC | Age: 48
End: 2018-11-14

## 2018-11-15 ENCOUNTER — OFFICE VISIT (OUTPATIENT)
Dept: INTERNAL MEDICINE CLINIC | Facility: CLINIC | Age: 48
End: 2018-11-15
Payer: MEDICARE

## 2018-11-15 ENCOUNTER — TELEPHONE (OUTPATIENT)
Dept: INTERNAL MEDICINE CLINIC | Facility: CLINIC | Age: 48
End: 2018-11-15

## 2018-11-15 ENCOUNTER — APPOINTMENT (OUTPATIENT)
Dept: RADIOLOGY | Facility: CLINIC | Age: 48
End: 2018-11-15
Payer: MEDICARE

## 2018-11-15 VITALS
BODY MASS INDEX: 41.96 KG/M2 | DIASTOLIC BLOOD PRESSURE: 80 MMHG | WEIGHT: 236.8 LBS | SYSTOLIC BLOOD PRESSURE: 130 MMHG | RESPIRATION RATE: 22 BRPM | TEMPERATURE: 98.6 F | HEART RATE: 92 BPM | HEIGHT: 63 IN

## 2018-11-15 DIAGNOSIS — R05.9 COUGH: Primary | ICD-10-CM

## 2018-11-15 DIAGNOSIS — R05.9 COUGH: ICD-10-CM

## 2018-11-15 PROCEDURE — 99213 OFFICE O/P EST LOW 20 MIN: CPT | Performed by: NURSE PRACTITIONER

## 2018-11-15 PROCEDURE — 71046 X-RAY EXAM CHEST 2 VIEWS: CPT

## 2018-11-15 RX ORDER — ALBUTEROL SULFATE 2.5 MG/3ML
2.5 SOLUTION RESPIRATORY (INHALATION) EVERY 6 HOURS PRN
Qty: 45 ML | Refills: 0 | Status: SHIPPED | OUTPATIENT
Start: 2018-11-15 | End: 2018-12-17

## 2018-11-15 RX ORDER — PREDNISONE 10 MG/1
TABLET ORAL
Qty: 12 TABLET | Refills: 0 | Status: SHIPPED | OUTPATIENT
Start: 2018-11-15 | End: 2018-11-26

## 2018-11-15 RX ORDER — ALBUTEROL SULFATE 2.5 MG/3ML
2.5 SOLUTION RESPIRATORY (INHALATION) ONCE
Status: COMPLETED | OUTPATIENT
Start: 2018-11-15 | End: 2018-11-15

## 2018-11-15 RX ADMIN — ALBUTEROL SULFATE 2.5 MG: 2.5 SOLUTION RESPIRATORY (INHALATION) at 13:29

## 2018-11-15 NOTE — PATIENT INSTRUCTIONS
Persistent cough/early pneumonia continue prednisone continue 30 mg for another 2 days then taper as discussed, continue to finish out your azithromycin, add nebulizer/albuterol every 4-6 hours for the next 24-48 hours then just as needed  Check chest x-ray today we will call with results if any time your worsening of symptoms okay to go to the emergency room    Addendum:  Patient has been sick for well over 3 weeks despite steroids, nebulizer and antibiotics  She was seen in the emergency over this past weekend for profound fatigue, cough, shortness of breath essentially all her labs and repeat chest x-ray were normal   At this time with her history of autoimmune condition and taking DMARD medications I think it is prudent to check CT to rule out interstitial lung disease versus fibrosis versus other    Will also order PFT

## 2018-11-15 NOTE — PROGRESS NOTES
So she has some inflammation to suggest a viral syndrome, let us just proceed with our plan there is no evidence of pneumonia

## 2018-11-15 NOTE — PROGRESS NOTES
Assessment/Plan:    Patient Instructions   Persistent cough/early pneumonia continue prednisone continue 30 mg for another 2 days then taper as discussed, continue to finish out your azithromycin, add nebulizer/albuterol every 4-6 hours for the next 24-48 hours then just as needed  Check chest x-ray today we will call with results if any time your worsening of symptoms okay to go to the emergency room    Addendum:  Patient has been sick for well over 3 weeks despite steroids, nebulizer and antibiotics  She was seen in the emergency over this past weekend for profound fatigue, cough, shortness of breath essentially all her labs and repeat chest x-ray were normal   At this time with her history of autoimmune condition and taking DMARD medications I think it is prudent to check CT to rule out interstitial lung disease versus fibrosis versus other  Will also order PFT         Diagnoses and all orders for this visit:    Cough  -     Mini neb; Future  -     albuterol inhalation solution 2 5 mg; Take 3 mL (2 5 mg total) by nebulization once   -     XR chest pa & lateral; Future  -     albuterol (2 5 mg/3 mL) 0 083 % nebulizer solution; Take 1 vial (2 5 mg total) by nebulization every 6 (six) hours as needed for wheezing or shortness of breath  -     predniSONE 10 mg tablet; Take 3 tabs daily x 2 days then 2 tabs x 2 days then 1 tab x 2 garber         Subjective:      Patient ID: Sona Ellis is a 46 y o  female    Patient was seen several days ago still not feeling well continues a complaint of heaviness in the chest cough wheeze and shortness of breath  She increased her prednisone to 30 mg for 2 days now she is on 20  She has rescue inhaler she still has cough predominantly only at nighttime feels winded and has wheeze  She can't say that she feels worse but she is just not feeling any better            Current Outpatient Prescriptions:     albuterol (PROAIR HFA) 90 mcg/act inhaler, Inhale 2 puffs every 6 (six) hours as needed for wheezing, Disp: 8 5 g, Rfl: 0    b complex vitamins capsule, Take 1 capsule by mouth daily, Disp: , Rfl:     buPROPion (WELLBUTRIN XL) 150 mg 24 hr tablet, Take 1 tablet (150 mg total) by mouth daily, Disp: 30 tablet, Rfl: 1    calcium carbonate (OS-APRYL) 600 MG tablet, Take 600 mg by mouth 2 (two) times a day with meals, Disp: , Rfl:     Cholecalciferol (VITAMIN D3) 5000 units CAPS, Take by mouth, Disp: , Rfl:     diazepam (VALIUM) 5 mg tablet, TAKE 1 TABLET BY MOUTH EVERY 6 HOURS AS NEEDED FOR MUSCLE SPASM, Disp: , Rfl: 0    ferrous gluconate (FERGON) 324 mg tablet, Take 1 tablet by mouth 2 (two) times a day with meals, Disp: , Rfl: 0    folic acid (FOLVITE) 1 mg tablet, Take 3 tablets by mouth daily  , Disp: , Rfl:     glucosamine-chondroitin 500-400 MG tablet, Take 1 tablet by mouth 3 (three) times a day, Disp: , Rfl:     ketoconazole (NIZORAL) 2 % cream, Apply topically daily, Disp: 30 g, Rfl: 2    leflunomide (ARAVA) 10 MG tablet, Take 20 mg by mouth daily, Disp: , Rfl:     LORazepam (ATIVAN) 0 5 mg tablet, Take by mouth, Disp: , Rfl:     methotrexate 2 5 mg tablet, Take 2 5 mg by mouth once a week 6 pills weekly , Disp: , Rfl:     mometasone (NASONEX) 50 mcg/act nasal spray, into each nostril, Disp: , Rfl:     NUCYNTA 50 MG tablet, , Disp: , Rfl:     olopatadine HCl (PATADAY) 0 2 % opth drops, Apply to eye, Disp: , Rfl:     omeprazole (PriLOSEC) 20 mg delayed release capsule, Take 20 mg by mouth daily, Disp: , Rfl:     oxyCODONE-acetaminophen (PERCOCET) 5-325 mg per tablet, Take 1 tablet by mouth Every 4 to 6 hours as needed for pain, Disp: , Rfl: 0    polyethylene glycol-propylene glycol (SYSTANE) 0 4-0 3 %, Apply to eye, Disp: , Rfl:     predniSONE 10 mg tablet, Take 3 tablets for 3 days then 2 tablets for 3 days then 1 tablet for 3 days, Disp: 18 tablet, Rfl: 0    predniSONE 20 mg tablet, 2 TABLET ORAL DAILY DURING MEAL TAPER 5 MG PO X5 MH EVERY 5 DAYS, Disp: , Rfl: 0    predniSONE 5 mg tablet, Take 5 mg by mouth daily, Disp: , Rfl:     Probiotic Product (PROBIOTIC-10) CAPS, Take by mouth, Disp: , Rfl:     rivaroxaban (XARELTO) 10 mg tablet, Take 10 mg by mouth daily, Disp: , Rfl:     rosuvastatin (CRESTOR) 10 MG tablet, TAKE 1 TABLET BY MOUTH EVERY DAY, Disp: 30 tablet, Rfl: 4    sertraline (ZOLOFT) 100 mg tablet, Take 1/2 tab daily, Disp: 30 tablet, Rfl: 0    sulfaSALAzine (AZULFIDINE) 500 mg tablet, Take 500 mg by mouth 2 (two) times a day 3 tabs bid  , Disp: , Rfl:     Tapentadol HCl (NUCYNTA PO), Take by mouth, Disp: , Rfl:     traMADol (ULTRAM) 50 mg tablet, Take 1 tablet by mouth every 6 (six) hours as needed, Disp: , Rfl:     XARELTO 20 MG tablet, TAKE ONE TABLET BY MOUTH EVERY DAY, Disp: 90 tablet, Rfl: 3    albuterol (2 5 mg/3 mL) 0 083 % nebulizer solution, Take 1 vial (2 5 mg total) by nebulization every 6 (six) hours as needed for wheezing or shortness of breath, Disp: 45 mL, Rfl: 0    predniSONE 10 mg tablet, Take 3 tabs daily x 2 days then 2 tabs x 2 days then 1 tab x 2 garber, Disp: 12 tablet, Rfl: 0    Recent Results (from the past 1008 hour(s))   Tissue Exam    Collection Time: 10/31/18  2:44 PM   Result Value Ref Range    Case Report       Surgical Pathology Report                         Case: [id number]                                   Authorizing Provider:  Erick Minor MD          Collected:           10/31/2018 1444              Ordering Location:     [facility]           Received:            10/31/2018 1917 \[facility] [place] [facility]\"" Dermatology                                                           Pathologist:           Adrian Barnhart MD                                                             Specimens:   A) - Arm, Left, L forearm                                                                           B) - Arm, Right, R upper arm                                                               Addendum       Special stain for fungus (PAS) is performed on specimen B and negative  A copy of the addendum is faxed to Dr Beatrice Arvizu on 11/7/18 @ 1745 hours  Final Diagnosis       A  Skin Arm, Left, Left forearm, shave biopsy:  - Benign verrucous keratosis  - Focal epidermolytic hyperkeratosis  - Negative for malignancy  B  Skin Arm, Right, Right upper arm, shave biopsy:  - Benign irritated keratosis with features of prurigo nodularis and excoriation  See Note  - Special stain for fungus (PAS) pending; results will be reported in an addendum    - Negative for malignancy  Note: The chronic stage of an eczematous dermatitis cannot be completely excluded  Interpretation performed at [facility], [address] [place] [id number]  Additional Information       All controls performed with the immunohistochemical stains reported above reacted appropriately  These tests were developed and their performance characteristics determined by [facility] or [facility]  They may not be cleared or approved by the [place]  Food and Drug Administration  The FDA has determined that such clearance or approval is not necessary  These tests are used for clinical purposes  They should not be regarded as investigational or for research  This laboratory has been approved by [facility], designated as a high-complexity laboratory and is qualified to perform these tests  Gross Description       A  The specimen is received in formalin, labeled with the patient's name and hospital number, and is designated "left forearm", is a shave biopsy of tan-white, roughened, and slightly raised skin measuring 0 7 x 0 6 x 0 1 cm  The apparent margin of resection is inked green  The specimen is trisected along the short axis and entirely submitted 1 cassette  B   The specimen is received in formalin, labeled with the patient's name and hospital number, and is designated "right upper arm, is a shave biopsy of tan-white, roughened, and slightly raised skin measuring 0 8 x 0 5 x 0 1 cm  The apparent margin of resection is inked green  The specimen is trisected along the short axis and entirely submitted 1 cassette  Note: The estimated total formalin fixation time based upon information provided by the submitting clinician and the standard processing schedule is under 72 hours    RRavotti            Clinical Information       r/o scc[shave x2  Squamous cell cancer versus prurigo nodularis   CBC    Collection Time: 11/01/18  4:09 PM   Result Value Ref Range    WBC 6 81 4 31 - 10 16 Thousand/uL    RBC 4 86 3 81 - 5 12 Million/uL    Hemoglobin 11 1 (L) 11 5 - 15 4 g/dL    Hematocrit 37 1 34 8 - 46 1 %    MCV 76 (L) 82 - 98 fL    MCH 22 8 (L) 26 8 - 34 3 pg    MCHC 29 9 (L) 31 4 - 37 4 g/dL    RDW 17 3 (H) 11 6 - 15 1 %    Platelets 697 037 - 363 Thousands/uL    MPV 11 7 8 9 - 12 7 fL   Comprehensive metabolic panel    Collection Time: 11/01/18  4:09 PM   Result Value Ref Range    Sodium 139 136 - 145 mmol/L    Potassium 4 1 3 5 - 5 3 mmol/L    Chloride 106 100 - 108 mmol/L    CO2 27 21 - 32 mmol/L    ANION GAP 6 4 - 13 mmol/L    BUN 14 5 - 25 mg/dL    Creatinine 0 75 0 60 - 1 30 mg/dL    Glucose 125 65 - 140 mg/dL    Calcium 9 4 8 3 - 10 1 mg/dL    AST 22 5 - 45 U/L    ALT 33 12 - 78 U/L    Alkaline Phosphatase 72 46 - 116 U/L    Total Protein 7 7 6 4 - 8 2 g/dL    Albumin 3 8 3 5 - 5 0 g/dL    Total Bilirubin 0 18 (L) 0 20 - 1 00 mg/dL    eGFR 93 ml/min/1 73sq m   Hemoglobin A1c    Collection Time: 11/01/18  4:09 PM   Result Value Ref Range    Hemoglobin A1C 6 2 4 2 - 6 3 %     mg/dl   Lipid panel    Collection Time: 11/01/18  4:09 PM   Result Value Ref Range    Cholesterol 198 50 - 200 mg/dL    Triglycerides 339 (H) <=150 mg/dL    HDL, Direct 50 40 - 60 mg/dL    LDL Calculated 80 0 - 100 mg/dL Non-HDL-Chol (CHOL-HDL) 148 mg/dl   TSH, 3rd generation    Collection Time: 11/01/18  4:09 PM   Result Value Ref Range    TSH 3RD GENERATON 1 620 0 358 - 3 740 uIU/mL   Iron Saturation %    Collection Time: 11/01/18  4:09 PM   Result Value Ref Range    Iron Saturation 7 %    TIBC 352 250 - 450 ug/dL    Iron 25 (L) 50 - 170 ug/dL   Ferritin    Collection Time: 11/01/18  4:09 PM   Result Value Ref Range    Ferritin 6 (L) 8 - 388 ng/mL   Occult Blood, Fecal Immunochemical    Collection Time: 11/12/18  3:14 PM   Result Value Ref Range    OCCULT BLD, FECAL IMMUNOLOGICAL Positive (A) Negative   Comprehensive metabolic panel    Collection Time: 11/24/18  8:34 PM   Result Value Ref Range    Sodium 140 136 - 145 mmol/L    Potassium 4 5 3 5 - 5 3 mmol/L    Chloride 103 100 - 108 mmol/L    CO2 26 21 - 32 mmol/L    ANION GAP 11 4 - 13 mmol/L    BUN 11 5 - 25 mg/dL    Creatinine 0 67 0 60 - 1 30 mg/dL    Glucose 119 65 - 140 mg/dL    Calcium 9 5 8 3 - 10 1 mg/dL    AST 23 5 - 45 U/L    ALT 36 12 - 78 U/L    Alkaline Phosphatase 66 46 - 116 U/L    Total Protein 7 7 6 4 - 8 2 g/dL    Albumin 3 7 3 5 - 5 0 g/dL    Total Bilirubin 0 10 (L) 0 20 - 1 00 mg/dL    eGFR 103 ml/min/1 73sq m   CBC and differential    Collection Time: 11/24/18  8:34 PM   Result Value Ref Range    WBC 9 40 4 31 - 10 16 Thousand/uL    RBC 4 91 3 81 - 5 12 Million/uL    Hemoglobin 11 0 (L) 11 5 - 15 4 g/dL    Hematocrit 36 4 34 8 - 46 1 %    MCV 74 (L) 82 - 98 fL    MCH 22 4 (L) 26 8 - 34 3 pg    MCHC 30 2 (L) 31 4 - 37 4 g/dL    RDW 17 2 (H) 11 6 - 15 1 %    MPV 11 8 8 9 - 12 7 fL    Platelets 639 259 - 946 Thousands/uL    nRBC 0 /100 WBCs    Neutrophils Relative 77 (H) 43 - 75 %    Immat GRANS % 0 0 - 2 %    Lymphocytes Relative 15 14 - 44 %    Monocytes Relative 6 4 - 12 %    Eosinophils Relative 1 0 - 6 %    Basophils Relative 1 0 - 1 %    Neutrophils Absolute 7 22 1 85 - 7 62 Thousands/µL    Immature Grans Absolute 0 03 0 00 - 0 20 Thousand/uL Lymphocytes Absolute 1 44 0 60 - 4 47 Thousands/µL    Monocytes Absolute 0 59 0 17 - 1 22 Thousand/µL    Eosinophils Absolute 0 06 0 00 - 0 61 Thousand/µL    Basophils Absolute 0 06 0 00 - 0 10 Thousands/µL   Troponin I    Collection Time: 11/24/18  8:34 PM   Result Value Ref Range    Troponin I <0 02 <=0 04 ng/mL       The following portions of the patient's history were reviewed and updated as appropriate: allergies, current medications, past family history, past medical history, past social history, past surgical history and problem list      Review of Systems   Constitutional: Positive for fatigue  Respiratory: Positive for cough, shortness of breath and wheezing  Objective:      /80   Pulse 92   Temp 98 6 °F (37 °C)   Resp 22   Ht 5' 3" (1 6 m)   Wt 107 kg (236 lb 12 8 oz)   BMI 41 95 kg/m²        Physical Exam   Constitutional: She is oriented to person, place, and time  She appears well-developed  No distress  HENT:   Head: Normocephalic and atraumatic  Nose: Nose normal    Mouth/Throat: Oropharynx is clear and moist    Eyes: Pupils are equal, round, and reactive to light  Conjunctivae and EOM are normal    Neck: Normal range of motion  Neck supple  No JVD present  No tracheal deviation present  No thyromegaly present  Cardiovascular: Normal rate, regular rhythm, normal heart sounds and intact distal pulses  Exam reveals no gallop and no friction rub  No murmur heard  Pulmonary/Chest: Effort normal  No respiratory distress  She has wheezes  She has no rales  Abdominal: Soft  Bowel sounds are normal  She exhibits no distension  There is no tenderness  Musculoskeletal: Normal range of motion  She exhibits no edema  Lymphadenopathy:     She has no cervical adenopathy  Neurological: She is alert and oriented to person, place, and time  No cranial nerve deficit  Skin: Skin is warm and dry  No rash noted  She is not diaphoretic     Psychiatric: She has a normal mood and affect   Her behavior is normal  Judgment and thought content normal

## 2018-11-15 NOTE — TELEPHONE ENCOUNTER
----- Message from Tamie Edwards, [address] sent at 11/15/2018  2:32 PM EST -----  So she has some inflammation to suggest a viral syndrome, let us just proceed with our plan there is no evidence of pneumonia

## 2018-11-24 ENCOUNTER — APPOINTMENT (EMERGENCY)
Dept: RADIOLOGY | Facility: HOSPITAL | Age: 48
End: 2018-11-24
Payer: MEDICARE

## 2018-11-24 ENCOUNTER — HOSPITAL ENCOUNTER (EMERGENCY)
Facility: HOSPITAL | Age: 48
Discharge: HOME | End: 2018-11-24
Attending: EMERGENCY MEDICINE | Admitting: EMERGENCY MEDICINE
Payer: MEDICARE

## 2018-11-24 VITALS
HEART RATE: 89 BPM | OXYGEN SATURATION: 97 % | BODY MASS INDEX: 40.75 KG/M2 | WEIGHT: 230 LBS | SYSTOLIC BLOOD PRESSURE: 136 MMHG | TEMPERATURE: 98.6 F | DIASTOLIC BLOOD PRESSURE: 79 MMHG | RESPIRATION RATE: 16 BRPM | HEIGHT: 63 IN

## 2018-11-24 DIAGNOSIS — R05.9 COUGH: ICD-10-CM

## 2018-11-24 DIAGNOSIS — R53.1 WEAKNESS: Primary | ICD-10-CM

## 2018-11-24 LAB
ALBUMIN SERPL BCP-MCNC: 3.7 G/DL (ref 3.5–5)
ALP SERPL-CCNC: 66 U/L (ref 46–116)
ALT SERPL W P-5'-P-CCNC: 36 U/L (ref 12–78)
ANION GAP SERPL CALCULATED.3IONS-SCNC: 11 MMOL/L (ref 4–13)
AST SERPL W P-5'-P-CCNC: 23 U/L (ref 5–45)
BASOPHILS # BLD AUTO: 0.06 THOUSANDS/ΜL (ref 0–0.1)
BASOPHILS NFR BLD AUTO: 1 % (ref 0–1)
BILIRUB SERPL-MCNC: 0.1 MG/DL (ref 0.2–1)
BUN SERPL-MCNC: 11 MG/DL (ref 5–25)
CALCIUM SERPL-MCNC: 9.5 MG/DL (ref 8.3–10.1)
CHLORIDE SERPL-SCNC: 103 MMOL/L (ref 100–108)
CO2 SERPL-SCNC: 26 MMOL/L (ref 21–32)
CREAT SERPL-MCNC: 0.67 MG/DL (ref 0.6–1.3)
EOSINOPHIL # BLD AUTO: 0.06 THOUSAND/ΜL (ref 0–0.61)
EOSINOPHIL NFR BLD AUTO: 1 % (ref 0–6)
ERYTHROCYTE [DISTWIDTH] IN BLOOD BY AUTOMATED COUNT: 17.2 % (ref 11.6–15.1)
GFR SERPL CREATININE-BSD FRML MDRD: 103 ML/MIN/1.73SQ M
GLUCOSE SERPL-MCNC: 119 MG/DL (ref 65–140)
HCT VFR BLD AUTO: 36.4 % (ref 34.8–46.1)
HGB BLD-MCNC: 11 G/DL (ref 11.5–15.4)
IMM GRANULOCYTES # BLD AUTO: 0.03 THOUSAND/UL (ref 0–0.2)
IMM GRANULOCYTES NFR BLD AUTO: 0 % (ref 0–2)
LYMPHOCYTES # BLD AUTO: 1.44 THOUSANDS/ΜL (ref 0.6–4.47)
LYMPHOCYTES NFR BLD AUTO: 15 % (ref 14–44)
MCH RBC QN AUTO: 22.4 PG (ref 26.8–34.3)
MCHC RBC AUTO-ENTMCNC: 30.2 G/DL (ref 31.4–37.4)
MCV RBC AUTO: 74 FL (ref 82–98)
MONOCYTES # BLD AUTO: 0.59 THOUSAND/ΜL (ref 0.17–1.22)
MONOCYTES NFR BLD AUTO: 6 % (ref 4–12)
NEUTROPHILS # BLD AUTO: 7.22 THOUSANDS/ΜL (ref 1.85–7.62)
NEUTS SEG NFR BLD AUTO: 77 % (ref 43–75)
NRBC BLD AUTO-RTO: 0 /100 WBCS
PLATELET # BLD AUTO: 225 THOUSANDS/UL (ref 149–390)
PMV BLD AUTO: 11.8 FL (ref 8.9–12.7)
POTASSIUM SERPL-SCNC: 4.5 MMOL/L (ref 3.5–5.3)
PROT SERPL-MCNC: 7.7 G/DL (ref 6.4–8.2)
RBC # BLD AUTO: 4.91 MILLION/UL (ref 3.81–5.12)
SODIUM SERPL-SCNC: 140 MMOL/L (ref 136–145)
TROPONIN I SERPL-MCNC: <0.02 NG/ML
WBC # BLD AUTO: 9.4 THOUSAND/UL (ref 4.31–10.16)

## 2018-11-24 PROCEDURE — 96361 HYDRATE IV INFUSION ADD-ON: CPT

## 2018-11-24 PROCEDURE — 96360 HYDRATION IV INFUSION INIT: CPT

## 2018-11-24 PROCEDURE — 71046 X-RAY EXAM CHEST 2 VIEWS: CPT

## 2018-11-24 PROCEDURE — 80053 COMPREHEN METABOLIC PANEL: CPT | Performed by: EMERGENCY MEDICINE

## 2018-11-24 PROCEDURE — 85025 COMPLETE CBC W/AUTO DIFF WBC: CPT | Performed by: EMERGENCY MEDICINE

## 2018-11-24 PROCEDURE — 99283 EMERGENCY DEPT VISIT LOW MDM: CPT

## 2018-11-24 PROCEDURE — 36415 COLL VENOUS BLD VENIPUNCTURE: CPT | Performed by: EMERGENCY MEDICINE

## 2018-11-24 PROCEDURE — 84484 ASSAY OF TROPONIN QUANT: CPT | Performed by: EMERGENCY MEDICINE

## 2018-11-24 RX ADMIN — SODIUM CHLORIDE 1000 ML: 0.9 INJECTION, SOLUTION INTRAVENOUS at 20:46

## 2018-11-25 NOTE — ED PROVIDER NOTES
History  Chief Complaint   Patient presents with    Hypertension     pt reports being "sick with pneumnia stuff for the last few weeks " pt reports talking her blood pressure being 150/106 and her doctor saying she should come in       3 weeks cough, nonproductive, and runny nose and sore throat  No aggravating or alleviating factors  No syncope or hemoptysis or chest pain  No leg pain or swelling  No recent surgery  No fever  Hypertension   Associated symptoms: shortness of breath    Associated symptoms: no fever        Prior to Admission Medications   Prescriptions Last Dose Informant Patient Reported? Taking?    Cholecalciferol (VITAMIN D3) 5000 units CAPS  Self Yes No   Sig: Take by mouth   LORazepam (ATIVAN) 0 5 mg tablet  Self Yes No   Sig: Take by mouth   NUCYNTA 50 MG tablet  Self Yes No   Probiotic Product (PROBIOTIC-10) CAPS  Self Yes No   Sig: Take by mouth   Tapentadol HCl (NUCYNTA PO)  Self Yes No   Sig: Take by mouth   XARELTO 20 MG tablet  Self No No   Sig: TAKE ONE TABLET BY MOUTH EVERY DAY   albuterol (2 5 mg/3 mL) 0 083 % nebulizer solution   No No   Sig: Take 1 vial (2 5 mg total) by nebulization every 6 (six) hours as needed for wheezing or shortness of breath   albuterol (PROAIR HFA) 90 mcg/act inhaler   No No   Sig: Inhale 2 puffs every 6 (six) hours as needed for wheezing   b complex vitamins capsule  Self Yes No   Sig: Take 1 capsule by mouth daily   buPROPion (WELLBUTRIN XL) 150 mg 24 hr tablet   No No   Sig: Take 1 tablet (150 mg total) by mouth daily   calcium carbonate (OS-APRYL) 600 MG tablet  Self Yes No   Sig: Take 600 mg by mouth 2 (two) times a day with meals   diazepam (VALIUM) 5 mg tablet  Self Yes No   Sig: TAKE 1 TABLET BY MOUTH EVERY 6 HOURS AS NEEDED FOR MUSCLE SPASM   ferrous gluconate (FERGON) 324 mg tablet  Self Yes No   Sig: Take 1 tablet by mouth 2 (two) times a day with meals   folic acid (FOLVITE) 1 mg tablet  Self Yes No   Sig: Take 3 tablets by mouth daily glucosamine-chondroitin 500-400 MG tablet  Self Yes No   Sig: Take 1 tablet by mouth 3 (three) times a day   ketoconazole (NIZORAL) 2 % cream  Self No No   Sig: Apply topically daily   leflunomide (ARAVA) 10 MG tablet  Self Yes No   Sig: Take 20 mg by mouth daily   methotrexate 2 5 mg tablet  Self Yes No   Sig: Take 2 5 mg by mouth once a week 6 pills weekly    mometasone (NASONEX) 50 mcg/act nasal spray  Self Yes No   Sig: into each nostril   olopatadine HCl (PATADAY) 0 2 % opth drops  Self Yes No   Sig: Apply to eye   omeprazole (PriLOSEC) 20 mg delayed release capsule  Self Yes No   Sig: Take 20 mg by mouth daily   oxyCODONE-acetaminophen (PERCOCET) 5-325 mg per tablet  Self Yes No   Sig: Take 1 tablet by mouth Every 4 to 6 hours as needed for pain   polyethylene glycol-propylene glycol (SYSTANE) 0 4-0 3 %  Self Yes No   Sig: Apply to eye   predniSONE 10 mg tablet   No No   Sig: Take 3 tablets for 3 days then 2 tablets for 3 days then 1 tablet for 3 days   predniSONE 10 mg tablet   No No   Sig: Take 3 tabs daily x 2 days then 2 tabs x 2 days then 1 tab x 2 garber   predniSONE 20 mg tablet  Self Yes No   Si TABLET ORAL DAILY DURING MEAL TAPER 5 MG PO X5 MH EVERY 5 DAYS   predniSONE 5 mg tablet   Yes No   Sig: Take 5 mg by mouth daily   rivaroxaban (XARELTO) 10 mg tablet  Self Yes No   Sig: Take 10 mg by mouth daily   rosuvastatin (CRESTOR) 10 MG tablet  Self No No   Sig: TAKE 1 TABLET BY MOUTH EVERY DAY   sertraline (ZOLOFT) 100 mg tablet   No No   Sig: Take 1/2 tab daily   sulfaSALAzine (AZULFIDINE) 500 mg tablet  Self Yes No   Sig: Take 500 mg by mouth 2 (two) times a day 3 tabs bid     traMADol (ULTRAM) 50 mg tablet  Self Yes No   Sig: Take 1 tablet by mouth every 6 (six) hours as needed      Facility-Administered Medications: None       Past Medical History:   Diagnosis Date    Arthritis     CRPS (complex regional pain syndrome)     DVT (deep venous thrombosis) (HCC)     Inflammatory spondylopathies ([facility] [address] )     Nonmelanoma skin cancer     LAST ASSESSED: [id number]    PE (pulmonary thromboembolism) (HCC)     PONV (postoperative nausea and vomiting)     Squamous cell carcinoma     Urinary incontinence     Uterine leiomyoma     LAST ASSESSED: [id number]       Past Surgical History:   Procedure Laterality Date    ANKLE SURGERY      BACK SURGERY  2018    BELOW KNEE LEG AMPUTATION      LAST ASSESSED: [id number]     SECTION      HYSTERECTOMY      LAST ASSESSED: [id number]    OTHER SURGICAL HISTORY      SPINAL STEROTAXIS STIMULATION OF CORD; LAST ASSESSED: [id number]    [place] LAP,RMV  ADNEXAL STRUCTURE N/A 11/10/2017    Procedure: LAPAROSCOPIC REMOVAL OF BILATERAL OVARIES;  LYSIS OF ADHESIONS;  Surgeon: Julianne Ho MD;  Location: [facility];  Service: Gynecology    SINUS SURGERY         Family History   Problem Relation Age of Onset    Hypertension Mother     Heart disease Mother     Coronary artery disease Mother         Due to calcified coronary lesion     Diabetes Father         Type 2, controlled with neuropathy     Vitiligo Brother     Breast cancer Cousin     Breast cancer Cousin     Breast cancer Other     Vitiligo Daughter      I have reviewed and agree with the history as documented  Social History   Substance Use Topics    Smoking status: Never Smoker    Smokeless tobacco: Never Used    Alcohol use No      Comment: occasional alcohol use as per all scripts         Review of Systems   Constitutional: Positive for activity change  Negative for chills and fever  Respiratory: Positive for cough and shortness of breath  All other systems reviewed and are negative  Physical Exam  Physical Exam   Constitutional: She is oriented to person, place, and time  She appears well-developed and well-nourished  No distress  HENT:   Head: Normocephalic and atraumatic  Eyes: Pupils are equal, round, and reactive to light   Conjunctivae and EOM are normal    Neck: Normal range of motion  Neck supple  No JVD present  Cardiovascular: Normal rate, regular rhythm, normal heart sounds and intact distal pulses  Pulmonary/Chest: Effort normal and breath sounds normal  No stridor  Abdominal: Soft  She exhibits no distension  There is no tenderness  There is no rebound and no guarding  Musculoskeletal: Normal range of motion  She exhibits no edema, tenderness or deformity  Neurological: She is alert and oriented to person, place, and time  No cranial nerve deficit or sensory deficit  She exhibits normal muscle tone  Coordination normal    Skin: Skin is warm and dry  Capillary refill takes less than 2 seconds  No rash noted  She is not diaphoretic  No erythema  No pallor  Nursing note and vitals reviewed        Vital Signs  ED Triage Vitals [11/24/18 1908]   Temperature Pulse Respirations Blood Pressure SpO2   98 6 °F (37 °C) 91 16 151/83 98 %      Temp Source Heart Rate Source Patient Position - Orthostatic VS BP Location FiO2 (%)   Oral Monitor Sitting Left arm --      Pain Score       No Pain           Vitals:    11/24/18 1908 11/24/18 2006 11/24/18 2048 11/24/18 2215   BP: 151/83 123/71 123/63 136/79   Pulse: 91 81 84 89   Patient Position - Orthostatic VS: Sitting Sitting Sitting        Visual Acuity      ED Medications  Medications   sodium chloride 0 9 % bolus 1,000 mL (0 mL Intravenous Stopped 11/24/18 2221)       Diagnostic Studies  Results Reviewed     Procedure Component Value Units Date/Time    Troponin I [[id number]]  (Normal) Collected:  11/24/18 2034    Lab Status:  Final result Specimen:  Blood from Arm, Left Updated:  11/24/18 2103     Troponin I <0 02 ng/mL     Comprehensive metabolic panel [[id number]]  (Abnormal) Collected:  11/24/18 2034    Lab Status:  Final result Specimen:  Blood from Hand, Left Updated:  11/24/18 2101     Sodium 140 mmol/L      Potassium 4 5 mmol/L      Chloride 103 mmol/L      CO2 26 mmol/L      ANION GAP 11 mmol/L      BUN 11 mg/dL Creatinine 0 67 mg/dL      Glucose 119 mg/dL      Calcium 9 5 mg/dL      AST 23 U/L      ALT 36 U/L      Alkaline Phosphatase 66 U/L      Total Protein 7 7 g/dL      Albumin 3 7 g/dL      Total Bilirubin 0 10 (L) mg/dL      eGFR 103 ml/min/1 73sq m     Narrative:         National Kidney Disease Education Program recommendations are as follows:  GFR calculation is accurate only with a steady state creatinine  Chronic Kidney disease less than 60 ml/min/1 73 sq  meters  Kidney failure less than 15 ml/min/1 73 sq  meters  CBC and differential [[id number]]  (Abnormal) Collected:  11/24/18 2034    Lab Status:  Final result Specimen:  Blood from Arm, Left Updated:  11/24/18 2042     WBC 9 40 Thousand/uL      RBC 4 91 Million/uL      Hemoglobin 11 0 (L) g/dL      Hematocrit 36 4 %      MCV 74 (L) fL      MCH 22 4 (L) pg      MCHC 30 2 (L) g/dL      RDW 17 2 (H) %      MPV 11 8 fL      Platelets 272 Thousands/uL      nRBC 0 /100 WBCs      Neutrophils Relative 77 (H) %      Immat GRANS % 0 %      Lymphocytes Relative 15 %      Monocytes Relative 6 %      Eosinophils Relative 1 %      Basophils Relative 1 %      Neutrophils Absolute 7 22 Thousands/µL      Immature Grans Absolute 0 03 Thousand/uL      Lymphocytes Absolute 1 44 Thousands/µL      Monocytes Absolute 0 59 Thousand/µL      Eosinophils Absolute 0 06 Thousand/µL      Basophils Absolute 0 06 Thousands/µL                  XR chest 2 views    (Results Pending)              Procedures  Procedures       Phone Contacts  ED Phone Contact    ED Course  ED Course as of Nov 25 0052   Sat Nov 24, 2018 2155 9:55 PM reexamined after fluids, feels improved, vitals remains normal, comfortable w plan to d/c home at this time  Has PCP appt Monday for further testing/tx  Discussed anemia w pt, she is aware, has scheduled f/u for this as well                                   MDM  Number of Diagnoses or Management Options  Cough:   Weakness:   Diagnosis management comments: 3 weeks cough and dyspnea  Well appearing pt  Perc negative, wells 0  Doubt anginal equivalent  Has pcp appt in 2 days  Was concerned about htn but highest   Appears appropriate for d/c home at this time  Amount and/or Complexity of Data Reviewed  Clinical lab tests: reviewed and ordered  Tests in the radiology section of CPT®: ordered and reviewed  Tests in the medicine section of CPT®: ordered and reviewed  Obtain history from someone other than the patient: yes      CritCare Time    Disposition  Final diagnoses:   Weakness   Cough     Time reflects when diagnosis was documented in both MDM as applicable and the Disposition within this note     Time User Action Codes Description Comment    11/24/2018  9:54 PM Amadeo Barthel Add [R53 1] Weakness     11/24/2018  9:54 PM Kennedy, Terri3 Roxanna Sanders [R05] Cough       ED Disposition     ED Disposition Condition Comment    Discharge  Crispin Johnson discharge to home/self care      Condition at discharge: Stable        Follow-up Information    None         Discharge Medication List as of 11/24/2018  9:55 PM      CONTINUE these medications which have NOT CHANGED    Details   albuterol (2 5 mg/3 mL) 0 083 % nebulizer solution Take 1 vial (2 5 mg total) by nebulization every 6 (six) hours as needed for wheezing or shortness of breath, Starting Thu 11/15/2018, Normal      albuterol (PROAIR HFA) 90 mcg/act inhaler Inhale 2 puffs every 6 (six) hours as needed for wheezing, Starting Mon 11/12/2018, Normal      b complex vitamins capsule Take 1 capsule by mouth daily, Historical Med      buPROPion (WELLBUTRIN XL) 150 mg 24 hr tablet Take 1 tablet (150 mg total) by mouth daily, Starting Mon 11/12/2018, Normal      calcium carbonate (OS-APRYL) 600 MG tablet Take 600 mg by mouth 2 (two) times a day with meals, Historical Med      Cholecalciferol (VITAMIN D3) 5000 units CAPS Take by mouth, Historical Med      diazepam (VALIUM) 5 mg tablet TAKE 1 TABLET BY MOUTH EVERY 6 HOURS AS NEEDED FOR MUSCLE SPASM, Historical Med      ferrous gluconate (FERGON) 324 mg tablet Take 1 tablet by mouth 2 (two) times a day with meals, Starting Mon 3/12/2018, Historical Med      folic acid (FOLVITE) 1 mg tablet Take 3 tablets by mouth daily  , Historical Med      glucosamine-chondroitin 500-400 MG tablet Take 1 tablet by mouth 3 (three) times a day, Historical Med      ketoconazole (NIZORAL) 2 % cream Apply topically daily, Starting Wed 10/31/2018, Normal      leflunomide (ARAVA) 10 MG tablet Take 20 mg by mouth daily, Historical Med      LORazepam (ATIVAN) 0 5 mg tablet Take by mouth, Historical Med      methotrexate 2 5 mg tablet Take 2 5 mg by mouth once a week 6 pills weekly , Historical Med      mometasone (NASONEX) 50 mcg/act nasal spray into each nostril, Starting Mon 1/27/2014, Historical Med      !! NUCYNTA 50 MG tablet Starting Wed 2/14/2018, Historical Med      olopatadine HCl (PATADAY) 0 2 % opth drops Apply to eye, Starting Wed 6/4/2014, Historical Med      omeprazole (PriLOSEC) 20 mg delayed release capsule Take 20 mg by mouth daily, Historical Med      oxyCODONE-acetaminophen (PERCOCET) 5-325 mg per tablet Take 1 tablet by mouth Every 4 to 6 hours as needed for pain, Starting Fri 3/9/2018, Historical Med      polyethylene glycol-propylene glycol (SYSTANE) 0 4-0 3 % Apply to eye, Historical Med      !! predniSONE 10 mg tablet Take 3 tablets for 3 days then 2 tablets for 3 days then 1 tablet for 3 days, Normal      !! predniSONE 10 mg tablet Take 3 tabs daily x 2 days then 2 tabs x 2 days then 1 tab x 2 garber, Normal      !! predniSONE 20 mg tablet 2 TABLET ORAL DAILY DURING MEAL TAPER 5 MG PO X5 MH EVERY 5 DAYS, Historical Med      !! predniSONE 5 mg tablet Take 5 mg by mouth daily, Historical Med      Probiotic Product (PROBIOTIC-10) CAPS Take by mouth, Historical Med      !! rivaroxaban (XARELTO) 10 mg tablet Take 10 mg by mouth daily, Historical Med      rosuvastatin (CRESTOR) 10 MG tablet TAKE 1 TABLET BY MOUTH EVERY DAY, Normal      sertraline (ZOLOFT) 100 mg tablet Take 1/2 tab daily, Normal      sulfaSALAzine (AZULFIDINE) 500 mg tablet Take 500 mg by mouth 2 (two) times a day 3 tabs bid  , Historical Med      !! Tapentadol HCl (NUCYNTA PO) Take by mouth, Historical Med      traMADol (ULTRAM) 50 mg tablet Take 1 tablet by mouth every 6 (six) hours as needed, Historical Med      !! XARELTO 20 MG tablet TAKE ONE TABLET BY MOUTH EVERY DAY, Normal       !! - Potential duplicate medications found  Please discuss with provider  No discharge procedures on file      ED Provider  Electronically Signed by           Leandro Robison MD  11/25/18 0572

## 2018-11-25 NOTE — DISCHARGE INSTRUCTIONS
Acute Cough   WHAT YOU NEED TO KNOW:   An acute cough can last up to 3 weeks  Common causes of an acute cough include a cold, allergies, or a lung infection  DISCHARGE INSTRUCTIONS:   Return to the emergency department if:   · You have trouble breathing or feel short of breath  · You cough up blood, or you see blood in your mucus  · You faint or feel weak or dizzy  · You have chest pain when you cough or take a deep breath  · You have new wheezing  Contact your healthcare provider if:   · You have a fever  · Your cough lasts longer than 4 weeks  · Your symptoms do not improve with treatment  · You have questions or concerns about your condition or care  Medicines:   · Medicines  may be needed to stop the cough, decrease swelling in your airways, or help open your airways  Medicine may also be given to help you cough up mucus  Ask your healthcare provider what over-the-counter medicines you can take  If you have an infection caused by bacteria, you may need antibiotics  · Take your medicine as directed  Contact your healthcare provider if you think your medicine is not helping or if you have side effects  Tell him or her if you are allergic to any medicine  Keep a list of the medicines, vitamins, and herbs you take  Include the amounts, and when and why you take them  Bring the list or the pill bottles to follow-up visits  Carry your medicine list with you in case of an emergency  Manage your symptoms:   · Do not smoke and stay away from others who smoke  Nicotine and other chemicals in cigarettes and cigars can cause lung damage and make your cough worse  Ask your healthcare provider for information if you currently smoke and need help to quit  E-cigarettes or smokeless tobacco still contain nicotine  Talk to your healthcare provider before you use these products  · Drink extra liquids as directed  Liquids will help thin and loosen mucus so you can cough it up   Liquids will also help prevent dehydration  Examples of good liquids to drink include water, fruit juice, and broth  Do not drink liquids that contain caffeine  Caffeine can increase your risk for dehydration  Ask your healthcare provider how much liquid to drink each day  · Rest as directed  Do not do activities that make your cough worse, such as exercise  · Use a humidifier or vaporizer  Use a cool mist humidifier or a vaporizer to increase air moisture in your home  This may make it easier for you to breathe and help decrease your cough  · Eat 2 to 5 mL of honey 2 times each day  Honey can help thin mucus and decrease your cough  · Use cough drops or lozenges  These can help decrease throat irritation and your cough  Follow up with your healthcare provider as directed:  Write down your questions so you remember to ask them during your visits  © 2017 [employer] Information is for End User's use only and may not be sold, redistributed or otherwise used for commercial purposes  All illustrations and images included in CareNotes® are the copyrighted property of [employer]  or [employer]  The above information is an  only  It is not intended as medical advice for individual conditions or treatments  Talk to your doctor, nurse or pharmacist before following any medical regimen to see if it is safe and effective for you

## 2018-11-26 ENCOUNTER — OFFICE VISIT (OUTPATIENT)
Dept: INTERNAL MEDICINE CLINIC | Facility: CLINIC | Age: 48
End: 2018-11-26
Payer: MEDICARE

## 2018-11-26 ENCOUNTER — HOSPITAL ENCOUNTER (OUTPATIENT)
Dept: CT IMAGING | Facility: HOSPITAL | Age: 48
Discharge: HOME | End: 2018-11-26
Payer: MEDICARE

## 2018-11-26 VITALS
HEIGHT: 63 IN | SYSTOLIC BLOOD PRESSURE: 130 MMHG | WEIGHT: 238 LBS | BODY MASS INDEX: 42.17 KG/M2 | DIASTOLIC BLOOD PRESSURE: 78 MMHG | TEMPERATURE: 98 F | RESPIRATION RATE: 14 BRPM | HEART RATE: 94 BPM

## 2018-11-26 DIAGNOSIS — R05.9 COUGH: ICD-10-CM

## 2018-11-26 DIAGNOSIS — J18.9 PNEUMONIA DUE TO INFECTIOUS ORGANISM, UNSPECIFIED LATERALITY, UNSPECIFIED PART OF LUNG: ICD-10-CM

## 2018-11-26 DIAGNOSIS — R05.9 COUGH: Primary | ICD-10-CM

## 2018-11-26 DIAGNOSIS — M35.9 AUTOIMMUNE DISEASE (HCC): ICD-10-CM

## 2018-11-26 DIAGNOSIS — D50.0 IRON DEFICIENCY ANEMIA DUE TO CHRONIC BLOOD LOSS: ICD-10-CM

## 2018-11-26 DIAGNOSIS — R00.2 PALPITATION: ICD-10-CM

## 2018-11-26 DIAGNOSIS — F41.9 ANXIETY: Primary | ICD-10-CM

## 2018-11-26 PROCEDURE — 93000 ELECTROCARDIOGRAM COMPLETE: CPT | Performed by: NURSE PRACTITIONER

## 2018-11-26 PROCEDURE — 99214 OFFICE O/P EST MOD 30 MIN: CPT | Performed by: NURSE PRACTITIONER

## 2018-11-26 PROCEDURE — 71250 CT THORAX DX C-: CPT

## 2018-11-26 RX ORDER — LORAZEPAM 0.5 MG/1
0.5 TABLET ORAL
Qty: 30 TABLET | Refills: 0 | Status: SHIPPED | OUTPATIENT
Start: 2018-11-26 | End: 2021-06-25 | Stop reason: SDUPTHER

## 2018-11-26 RX ORDER — PREDNISONE 10 MG/1
TABLET ORAL
Qty: 18 TABLET | Refills: 0 | Status: SHIPPED | OUTPATIENT
Start: 2018-11-26 | End: 2018-12-17

## 2018-11-26 NOTE — PROGRESS NOTES
Assessment/Plan:    Patient Instructions   Status post acute bronchitis with persistent cough fatigue and now palpitations she has completed the azithromycin and 2 courses of prednisone taper  I feel that her fatigue and palpitation are related to just prolonged illness and side effects of the medicine  I discussed over the next several days she should start to improve  Okay to take Ativan as needed  As far as the palpitations EKG in the office was normal, will follow up with a Holter monitor  She should continue to hold off on her DMARDs and contact Rheumatology with how she has been feeling since she has also started a new medication through them  Hypertension blood pressure in office is normal but she is documenting 150s over 100 again may be related to prednisone versus anxiety continue to monitor no indication for medicine at this time         Diagnoses and all orders for this visit:    Anxiety  -     LORazepam (ATIVAN) 0 5 mg tablet; Take 1 tablet (0 5 mg total) by mouth daily at bedtime    Palpitation  -     POCT ECG  -     Holter monitor - 24 hour; Future    Autoimmune disease ([facility] )    Iron deficiency anemia due to chronic blood loss         Subjective:      Patient ID: Rosi Sue is a 46 y o  female    Patient has not been feeling well for several weeks now    She completed antibiotics and 2 rounds of steroids now using nebulizer she continues with cough although that slowly improving has profound fatigue and odd sensation in her chest   She describes it as her heart is racing and flipping in her chest   She recently started arrive she wonders if she is having side effects she has not called Rheumatology  She has intermittent diarrhea she is scheduled to see GI in 2 weeks for heme-positive stool          Current Outpatient Prescriptions:     albuterol (2 5 mg/3 mL) 0 083 % nebulizer solution, Take 1 vial (2 5 mg total) by nebulization every 6 (six) hours as needed for wheezing or shortness of breath, Disp: 45 mL, Rfl: 0    albuterol (PROAIR HFA) 90 mcg/act inhaler, Inhale 2 puffs every 6 (six) hours as needed for wheezing, Disp: 8 5 g, Rfl: 0    b complex vitamins capsule, Take 1 capsule by mouth daily, Disp: , Rfl:     buPROPion (WELLBUTRIN XL) 150 mg 24 hr tablet, Take 1 tablet (150 mg total) by mouth daily, Disp: 30 tablet, Rfl: 1    calcium carbonate (OS-APRYL) 600 MG tablet, Take 600 mg by mouth 2 (two) times a day with meals, Disp: , Rfl:     Cholecalciferol (VITAMIN D3) 5000 units CAPS, Take by mouth, Disp: , Rfl:     diazepam (VALIUM) 5 mg tablet, TAKE 1 TABLET BY MOUTH EVERY 6 HOURS AS NEEDED FOR MUSCLE SPASM, Disp: , Rfl: 0    ferrous gluconate (FERGON) 324 mg tablet, Take 1 tablet by mouth 2 (two) times a day with meals, Disp: , Rfl: 0    folic acid (FOLVITE) 1 mg tablet, Take 3 tablets by mouth daily  , Disp: , Rfl:     glucosamine-chondroitin 500-400 MG tablet, Take 1 tablet by mouth 3 (three) times a day, Disp: , Rfl:     ketoconazole (NIZORAL) 2 % cream, Apply topically daily, Disp: 30 g, Rfl: 2    leflunomide (ARAVA) 10 MG tablet, Take 20 mg by mouth daily, Disp: , Rfl:     LORazepam (ATIVAN) 0 5 mg tablet, Take 1 tablet (0 5 mg total) by mouth daily at bedtime, Disp: 30 tablet, Rfl: 0    methotrexate 2 5 mg tablet, Take 2 5 mg by mouth once a week 6 pills weekly , Disp: , Rfl:     mometasone (NASONEX) 50 mcg/act nasal spray, into each nostril, Disp: , Rfl:     NUCYNTA 50 MG tablet, , Disp: , Rfl:     olopatadine HCl (PATADAY) 0 2 % opth drops, Apply to eye, Disp: , Rfl:     omeprazole (PriLOSEC) 20 mg delayed release capsule, Take 20 mg by mouth daily, Disp: , Rfl:     polyethylene glycol-propylene glycol (SYSTANE) 0 4-0 3 %, Apply to eye, Disp: , Rfl:     Probiotic Product (PROBIOTIC-10) CAPS, Take by mouth, Disp: , Rfl:     rivaroxaban (XARELTO) 10 mg tablet, Take 10 mg by mouth daily, Disp: , Rfl:     rosuvastatin (CRESTOR) 10 MG tablet, TAKE 1 TABLET BY MOUTH EVERY DAY, Disp: 30 tablet, Rfl: 4    sertraline (ZOLOFT) 100 mg tablet, Take 1/2 tab daily, Disp: 30 tablet, Rfl: 0    sulfaSALAzine (AZULFIDINE) 500 mg tablet, Take 500 mg by mouth 2 (two) times a day 3 tabs bid  , Disp: , Rfl:     Tapentadol HCl (NUCYNTA PO), Take by mouth, Disp: , Rfl:     XARELTO 20 MG tablet, TAKE ONE TABLET BY MOUTH EVERY DAY, Disp: 90 tablet, Rfl: 3    predniSONE 10 mg tablet, Take 3 tablets for 3 days then 2 tablets for 3 days then 1 tablet for 3 days (Patient not taking: Reported on 11/26/2018 ), Disp: 18 tablet, Rfl: 0    Recent Results (from the past 1008 hour(s))   Tissue Exam    Collection Time: 10/31/18  2:44 PM   Result Value Ref Range    Case Report       Surgical Pathology Report                         Case: [id number]                                   Authorizing Provider:  Jason Franco MD          Collected:           10/31/2018 1444              Ordering Location:     [facility]           Received:            10/31/2018 [address] [facility]                                                           Pathologist:           Todd Larry MD                                                             Specimens:   A) - Arm, Left, L forearm                                                                           B) - Arm, Right, R upper arm                                                               Addendum       Special stain for fungus (PAS) is performed on specimen B and negative  A copy of the addendum is faxed to Dr Shweta Booth on 11/7/18 @ 1745 hours  Final Diagnosis       A  Skin Arm, Left, Left forearm, shave biopsy:  - Benign verrucous keratosis  - Focal epidermolytic hyperkeratosis  - Negative for malignancy         B  Skin Arm, Right, Right upper arm, shave biopsy:  - Benign irritated keratosis with features of prurigo nodularis and excoriation  See Note  - Special stain for fungus (PAS) pending; results will be reported in an addendum    - Negative for malignancy  Note: The chronic stage of an eczematous dermatitis cannot be completely excluded  Interpretation performed at [facility], [address] [place] [address]  Additional Information       All controls performed with the immunohistochemical stains reported above reacted appropriately  These tests were developed and their performance characteristics determined by [address] [place] [address] or [facility]  They may not be cleared or approved by the [place]  Food and Drug Administration  The FDA has determined that such clearance or approval is not necessary  These tests are used for clinical purposes  They should not be regarded as investigational or for research  This laboratory has been approved by [place] [id number], designated as a high-complexity laboratory and is qualified to perform these tests  Gross Description       A  The specimen is received in formalin, labeled with the patient's name and hospital number, and is designated "left forearm", is a shave biopsy of tan-white, roughened, and slightly raised skin measuring 0 7 x 0 6 x 0 1 cm  The apparent margin of resection is inked green  The specimen is trisected along the short axis and entirely submitted 1 cassette  B  The specimen is received in formalin, labeled with the patient's name and hospital number, and is designated "right upper arm, is a shave biopsy of tan-white, roughened, and slightly raised skin measuring 0 8 x 0 5 x 0 1 cm  The apparent margin of resection is inked green  The specimen is trisected along the short axis and entirely submitted 1 cassette  Note: The estimated total formalin fixation time based upon information provided by the submitting clinician and the standard processing schedule is under 72 hours    RRavotti            Clinical Information       r/o scc[shave x2  Squamous cell cancer versus prurigo nodularis   CBC    Collection Time: 11/01/18  4:09 PM   Result Value Ref Range    WBC 6 81 4 31 - 10 16 Thousand/uL    RBC 4 86 3 81 - 5 12 Million/uL    Hemoglobin 11 1 (L) 11 5 - 15 4 g/dL    Hematocrit 37 1 34 8 - 46 1 %    MCV 76 (L) 82 - 98 fL    MCH 22 8 (L) 26 8 - 34 3 pg    MCHC 29 9 (L) 31 4 - 37 4 g/dL    RDW 17 3 (H) 11 6 - 15 1 %    Platelets 520 964 - 373 Thousands/uL    MPV 11 7 8 9 - 12 7 fL   Comprehensive metabolic panel    Collection Time: 11/01/18  4:09 PM   Result Value Ref Range    Sodium 139 136 - 145 mmol/L    Potassium 4 1 3 5 - 5 3 mmol/L    Chloride 106 100 - 108 mmol/L    CO2 27 21 - 32 mmol/L    ANION GAP 6 4 - 13 mmol/L    BUN 14 5 - 25 mg/dL    Creatinine 0 75 0 60 - 1 30 mg/dL    Glucose 125 65 - 140 mg/dL    Calcium 9 4 8 3 - 10 1 mg/dL    AST 22 5 - 45 U/L    ALT 33 12 - 78 U/L    Alkaline Phosphatase 72 46 - 116 U/L    Total Protein 7 7 6 4 - 8 2 g/dL    Albumin 3 8 3 5 - 5 0 g/dL    Total Bilirubin 0 18 (L) 0 20 - 1 00 mg/dL    eGFR 93 ml/min/1 73sq m   Hemoglobin A1c    Collection Time: 11/01/18  4:09 PM   Result Value Ref Range    Hemoglobin A1C 6 2 4 2 - 6 3 %     mg/dl   Lipid panel    Collection Time: 11/01/18  4:09 PM   Result Value Ref Range    Cholesterol 198 50 - 200 mg/dL    Triglycerides 339 (H) <=150 mg/dL    HDL, Direct 50 40 - 60 mg/dL    LDL Calculated 80 0 - 100 mg/dL    Non-HDL-Chol (CHOL-HDL) 148 mg/dl   TSH, 3rd generation    Collection Time: 11/01/18  4:09 PM   Result Value Ref Range    TSH 3RD GENERATON 1 620 0 358 - 3 740 uIU/mL   Iron Saturation %    Collection Time: 11/01/18  4:09 PM   Result Value Ref Range    Iron Saturation 7 %    TIBC 352 250 - 450 ug/dL    Iron 25 (L) 50 - 170 ug/dL   Ferritin    Collection Time: 11/01/18  4:09 PM   Result Value Ref Range    Ferritin 6 (L) 8 - 388 ng/mL   Occult Blood, Fecal Immunochemical    Collection Time: 11/12/18  3:14 PM   Result Value Ref Range    OCCULT BLD, FECAL IMMUNOLOGICAL Positive (A) Negative   Comprehensive metabolic panel    Collection Time: 11/24/18  8:34 PM   Result Value Ref Range    Sodium 140 136 - 145 mmol/L    Potassium 4 5 3 5 - 5 3 mmol/L    Chloride 103 100 - 108 mmol/L    CO2 26 21 - 32 mmol/L    ANION GAP 11 4 - 13 mmol/L    BUN 11 5 - 25 mg/dL    Creatinine 0 67 0 60 - 1 30 mg/dL    Glucose 119 65 - 140 mg/dL    Calcium 9 5 8 3 - 10 1 mg/dL    AST 23 5 - 45 U/L    ALT 36 12 - 78 U/L    Alkaline Phosphatase 66 46 - 116 U/L    Total Protein 7 7 6 4 - 8 2 g/dL    Albumin 3 7 3 5 - 5 0 g/dL    Total Bilirubin 0 10 (L) 0 20 - 1 00 mg/dL    eGFR 103 ml/min/1 73sq m   CBC and differential    Collection Time: 11/24/18  8:34 PM   Result Value Ref Range    WBC 9 40 4 31 - 10 16 Thousand/uL    RBC 4 91 3 81 - 5 12 Million/uL    Hemoglobin 11 0 (L) 11 5 - 15 4 g/dL    Hematocrit 36 4 34 8 - 46 1 %    MCV 74 (L) 82 - 98 fL    MCH 22 4 (L) 26 8 - 34 3 pg    MCHC 30 2 (L) 31 4 - 37 4 g/dL    RDW 17 2 (H) 11 6 - 15 1 %    MPV 11 8 8 9 - 12 7 fL    Platelets 447 414 - 030 Thousands/uL    nRBC 0 /100 WBCs    Neutrophils Relative 77 (H) 43 - 75 %    Immat GRANS % 0 0 - 2 %    Lymphocytes Relative 15 14 - 44 %    Monocytes Relative 6 4 - 12 %    Eosinophils Relative 1 0 - 6 %    Basophils Relative 1 0 - 1 %    Neutrophils Absolute 7 22 1 85 - 7 62 Thousands/µL    Immature Grans Absolute 0 03 0 00 - 0 20 Thousand/uL    Lymphocytes Absolute 1 44 0 60 - 4 47 Thousands/µL    Monocytes Absolute 0 59 0 17 - 1 22 Thousand/µL    Eosinophils Absolute 0 06 0 00 - 0 61 Thousand/µL    Basophils Absolute 0 06 0 00 - 0 10 Thousands/µL   Troponin I    Collection Time: 11/24/18  8:34 PM   Result Value Ref Range    Troponin I <0 02 <=0 04 ng/mL       The following portions of the patient's history were reviewed and updated as appropriate: allergies, current medications, past family history, past medical history, past social history, past surgical history and problem list      Review of Systems   Constitutional: Positive for fatigue  Negative for appetite change, chills, diaphoresis, fever and unexpected weight change  HENT: Negative for postnasal drip and sneezing  Eyes: Negative for visual disturbance  Respiratory: Positive for cough  Negative for chest tightness and shortness of breath  Cardiovascular: Positive for palpitations  Negative for chest pain and leg swelling  Gastrointestinal: Negative for abdominal pain and blood in stool  Endocrine: Negative for cold intolerance, heat intolerance, polydipsia, polyphagia and polyuria  Genitourinary: Negative for difficulty urinating, dysuria, frequency and urgency  Musculoskeletal: Negative for arthralgias and myalgias  Skin: Negative for rash and wound  Neurological: Negative for dizziness, weakness, light-headedness and headaches  Hematological: Negative for adenopathy  Psychiatric/Behavioral: Negative for confusion, dysphoric mood and sleep disturbance  The patient is not nervous/anxious  Objective:      /78   Pulse 94   Temp 98 °F (36 7 °C)   Resp 14   Ht 5' 3" (1 6 m)   Wt 108 kg (238 lb)   BMI 42 16 kg/m²        Physical Exam   Constitutional: She is oriented to person, place, and time  She appears well-developed  No distress  HENT:   Head: Normocephalic and atraumatic  Nose: Nose normal    Mouth/Throat: Oropharynx is clear and moist    Eyes: Pupils are equal, round, and reactive to light  Conjunctivae and EOM are normal    Neck: Normal range of motion  Neck supple  No JVD present  No tracheal deviation present  No thyromegaly present  Cardiovascular: Normal rate, normal heart sounds and intact distal pulses  Exam reveals no gallop and no friction rub  No murmur heard  Pulmonary/Chest: Effort normal and breath sounds normal  No respiratory distress  She has no wheezes  She has no rales  Abdominal: Soft  Bowel sounds are normal  She exhibits no distension  There is no tenderness  Musculoskeletal: Normal range of motion  She exhibits no edema  Lymphadenopathy:     She has no cervical adenopathy  Neurological: She is alert and oriented to person, place, and time  No cranial nerve deficit  Skin: Skin is warm and dry  No rash noted  She is not diaphoretic  Psychiatric: She has a normal mood and affect   Her behavior is normal  Judgment and thought content normal

## 2018-11-26 NOTE — PATIENT INSTRUCTIONS
Status post acute bronchitis with persistent cough fatigue and now palpitations she has completed the azithromycin and 2 courses of prednisone taper  I feel that her fatigue and palpitation are related to just prolonged illness and side effects of the medicine  I discussed over the next several days she should start to improve  Okay to take Ativan as needed  As far as the palpitations EKG in the office was normal, will follow up with a Holter monitor  She should continue to hold off on her DMARDs and contact Rheumatology with how she has been feeling since she has also started a new medication through them      Hypertension blood pressure in office is normal but she is documenting 150s over 100 again may be related to prednisone versus anxiety continue to monitor no indication for medicine at this time

## 2018-11-29 ENCOUNTER — TELEPHONE (OUTPATIENT)
Dept: GASTROENTEROLOGY | Facility: CLINIC | Age: 48
End: 2018-11-29

## 2018-11-29 NOTE — TELEPHONE ENCOUNTER
----- Message from Radha Navarro MD sent at 11/28/2018 12:57 PM EST -----  pls offer direct egd and colon for iron deficiency anemia

## 2018-12-10 ENCOUNTER — OFFICE VISIT (OUTPATIENT)
Dept: GASTROENTEROLOGY | Facility: CLINIC | Age: 48
End: 2018-12-10
Payer: MEDICARE

## 2018-12-10 VITALS
WEIGHT: 238.13 LBS | SYSTOLIC BLOOD PRESSURE: 130 MMHG | DIASTOLIC BLOOD PRESSURE: 84 MMHG | HEART RATE: 104 BPM | BODY MASS INDEX: 42.18 KG/M2

## 2018-12-10 DIAGNOSIS — D50.0 IRON DEFICIENCY ANEMIA DUE TO CHRONIC BLOOD LOSS: ICD-10-CM

## 2018-12-10 DIAGNOSIS — D50.9 IRON DEFICIENCY ANEMIA, UNSPECIFIED IRON DEFICIENCY ANEMIA TYPE: Primary | ICD-10-CM

## 2018-12-10 DIAGNOSIS — K21.9 GASTROESOPHAGEAL REFLUX DISEASE WITHOUT ESOPHAGITIS: ICD-10-CM

## 2018-12-10 PROCEDURE — 99204 OFFICE O/P NEW MOD 45 MIN: CPT | Performed by: INTERNAL MEDICINE

## 2018-12-10 RX ORDER — DICYCLOMINE HCL 20 MG
20 TABLET ORAL 3 TIMES DAILY PRN
Qty: 10 TABLET | Refills: 0 | Status: SHIPPED | OUTPATIENT
Start: 2018-12-10 | End: 2019-02-27 | Stop reason: CLARIF

## 2018-12-10 NOTE — PROGRESS NOTES
Consultation - [facility]  Colleenolivia Verabrinda 1970 46 y o  female     ASSESSMENT @ PLAN:   He is a 28-year-old female with a stool Hemoccult positive microcytic iron-deficient anemia for year and half  She has severe RSD that is worsened with bowel movements so we talked about this at length  She has known gastroesophageal reflux disease that is under control on omeprazole  1 will do EGD and colonoscopy to investigate; I told her not to do the Dulcolax and to take dicyclomine prior to the bowel preparation    2 she will continue on her omeprazole    3 the endoscopy and colonoscopy are non revealing we will do a video capsule endoscopy    Chief Complaint:   Stool Hemoccult positive iron deficiency anemia    HPI:   She is a 28-year-old female that is here for stool Hemoccult positive result  In reviewing her labs she she has been iron deficient and had a microcytosis and anemia for over a year and a half  She has known hemorrhoids that do bleed at times  She has not had any melena  She has chronic gastroesophageal reflux disease that is under control on omeprazole  When she does not take it she has severe regurgitation and heartburn  She has no melena  She has normal bowel movements twice a day  She is on sulfasalazine for her autoimmune arthritis  She had a hysterectomy and so she does not have a menses  She has a severe reflexive nerve pain that is worsened when she has bowel movements so we spent a lot of the visit talking about the bowel preparation in strategies to minimize this problem  She reports that she was having a lot of gastrointestinal upset and bloating from recent arthritis medication and she stopped it  She has never had endoscopy or colonoscopy  REVIEW OF SYSTEMS:     CONSTITUTIONAL: Denies any fever, chills, or rigors  Good appetite, and no recent weight loss  HEENT: No earache or tinnitus  Denies hearing loss or visual disturbances    CARDIOVASCULAR: No chest pain or palpitations  RESPIRATORY: Denies any cough, hemoptysis, shortness of breath or dyspnea on exertion  GASTROINTESTINAL: As noted in the History of Present Illness  GENITOURINARY: No problems with urination  Denies any hematuria or dysuria  NEUROLOGIC: No dizziness or vertigo, denies headaches  MUSCULOSKELETAL: Denies any muscle or joint pain  SKIN: Denies skin rashes or itching  ENDOCRINE: Denies excessive thirst  Denies intolerance to heat or cold  PSYCHOSOCIAL: Denies depression or anxiety  Denies any recent memory loss       Past Medical History:   Diagnosis Date    Arthritis     CRPS (complex regional pain syndrome)     DVT (deep venous thrombosis) (HCC)     Inflammatory spondylopathies (HCC)     Nonmelanoma skin cancer     LAST ASSESSED: [id number]    PE (pulmonary thromboembolism) (HCC)     PONV (postoperative nausea and vomiting)     Squamous cell carcinoma     Urinary incontinence     Uterine leiomyoma     LAST ASSESSED: [id number]      Past Surgical History:   Procedure Laterality Date    ANKLE SURGERY      BACK SURGERY  2018    BELOW KNEE LEG AMPUTATION      LAST ASSESSED: [id number]     SECTION      HYSTERECTOMY      LAST ASSESSED: [id number]    OTHER SURGICAL HISTORY      SPINAL STEROTAXIS STIMULATION OF CORD; LAST ASSESSED: [id number]    UT LAP,RMV  ADNEXAL STRUCTURE N/A 11/10/2017    Procedure: LAPAROSCOPIC REMOVAL OF BILATERAL OVARIES;  LYSIS OF ADHESIONS;  Surgeon: Farhana Mayo MD;  Location: [facility];  Service: Gynecology    SINUS SURGERY       Social History     Social History    Marital status: /Civil Union     Spouse name: N/A    Number of children: N/A    Years of education: N/A     Occupational History    Retired      Social History Main Topics    Smoking status: Never Smoker    Smokeless tobacco: Never Used    Alcohol use Yes      Comment: occasional alcohol use as per all scripts     Drug use: No    Sexual activity: Yes     Other Topics Concern    Not on file     Social History Narrative    No narrative on file     Family History   Problem Relation Age of Onset    Hypertension Mother     Heart disease Mother     Coronary artery disease Mother         Due to calcified coronary lesion     Diabetes Father         Type 2, controlled with neuropathy     Vitiligo Brother     Breast cancer Cousin     Breast cancer Cousin     Breast cancer Other     Vitiligo Daughter      Penicillins; Ciprofloxacin; Codeine; and Morphine  Current Outpatient Prescriptions   Medication Sig Dispense Refill    b complex vitamins capsule Take 1 capsule by mouth daily      buPROPion (WELLBUTRIN XL) 150 mg 24 hr tablet Take 1 tablet (150 mg total) by mouth daily 30 tablet 1    calcium carbonate (OS-APRYL) 600 MG tablet Take 600 mg by mouth 2 (two) times a day with meals      Cholecalciferol (VITAMIN D3) 5000 units CAPS Take by mouth      folic acid (FOLVITE) 1 mg tablet Take 3 tablets by mouth daily        glucosamine-chondroitin 500-400 MG tablet Take 1 tablet by mouth 3 (three) times a day      ketoconazole (NIZORAL) 2 % cream Apply topically daily 30 g 2    LORazepam (ATIVAN) 0 5 mg tablet Take 1 tablet (0 5 mg total) by mouth daily at bedtime 30 tablet 0    methotrexate 2 5 mg tablet Take 2 5 mg by mouth once a week 6 pills weekly       NUCYNTA 50 MG tablet       omeprazole (PriLOSEC) 20 mg delayed release capsule Take 20 mg by mouth daily      polyethylene glycol-propylene glycol (SYSTANE) 0 4-0 3 % Apply to eye      predniSONE 10 mg tablet Take 3 tablets for 3 days then 2 tablets for 3 days then 1 tablet for 3 days 18 tablet 0    Probiotic Product (PROBIOTIC-10) CAPS Take by mouth      rosuvastatin (CRESTOR) 10 MG tablet TAKE 1 TABLET BY MOUTH EVERY DAY 30 tablet 4    sertraline (ZOLOFT) 100 mg tablet Take 1/2 tab daily 30 tablet 0    sulfaSALAzine (AZULFIDINE) 500 mg tablet Take 500 mg by mouth 2 (two) times a day 3 tabs bid        Tapentadol HCl (NUCYNTA PO) Take by mouth      XARELTO 20 MG tablet TAKE ONE TABLET BY MOUTH EVERY DAY 90 tablet 3    albuterol (2 5 mg/3 mL) 0 083 % nebulizer solution Take 1 vial (2 5 mg total) by nebulization every 6 (six) hours as needed for wheezing or shortness of breath (Patient not taking: Reported on 12/10/2018 ) 45 mL 0    albuterol (PROAIR HFA) 90 mcg/act inhaler Inhale 2 puffs every 6 (six) hours as needed for wheezing (Patient not taking: Reported on 12/10/2018 ) 8 5 g 0    diazepam (VALIUM) 5 mg tablet TAKE 1 TABLET BY MOUTH EVERY 6 HOURS AS NEEDED FOR MUSCLE SPASM  0    dicyclomine (BENTYL) 20 mg tablet Take 1 tablet (20 mg total) by mouth 3 (three) times a day as needed (abdominal pain) 10 tablet 0    ferrous gluconate (FERGON) 324 mg tablet Take 1 tablet by mouth 2 (two) times a day with meals  0    leflunomide (ARAVA) 10 MG tablet Take 20 mg by mouth daily      mometasone (NASONEX) 50 mcg/act nasal spray into each nostril      olopatadine HCl (PATADAY) 0 2 % opth drops Apply to eye      rivaroxaban (XARELTO) 10 mg tablet Take 10 mg by mouth daily       No current facility-administered medications for this visit  Blood pressure 130/84, pulse 104, weight 108 kg (238 lb 2 oz)  PHYSICAL EXAM:     General Appearance:   Alert, cooperative, no distress, appears stated age    HEENT:   Normocephalic, atraumatic, anicteric      Neck:  Supple, symmetrical, trachea midline, no adenopathy;    thyroid: no enlargement/tenderness/nodules; no carotid  bruit or JVD    Lungs:   Clear to auscultation bilaterally; no rales, rhonchi or wheezing; respirations unlabored    Heart[de-identified]   S1 and S2 normal; regular rate and rhythm; no murmur, rub, or gallop     Abdomen:   Soft, non-tender, non-distended; normal bowel sounds; no masses, no organomegaly    Genitalia:   Deferred    Rectal:   Deferred    Extremities:  No cyanosis, clubbing or edema    Pulses:  2+ and symmetric all extremities  Skin:  Skin color, texture, turgor normal, no rashes or lesions    Lymph nodes:  No palpable cervical, axillary or inguinal lymphadenopathy        Lab Results   Component Value Date    WBC 9 40 11/24/2018    HGB 11 0 (L) 11/24/2018    HCT 36 4 11/24/2018    MCV 74 (L) 11/24/2018     11/24/2018     Lab Results   Component Value Date    GLUCOSE 117 (H) 02/13/2017    CALCIUM 9 5 11/24/2018     02/13/2017    K 4 5 11/24/2018    CO2 26 11/24/2018     11/24/2018    BUN 11 11/24/2018    CREATININE 0 67 11/24/2018     Lab Results   Component Value Date    ALT 36 11/24/2018    AST 23 11/24/2018    ALKPHOS 66 11/24/2018    BILITOT <0 2 02/13/2017     Lab Results   Component Value Date    INR 1 26 (H) 10/30/2017    INR 2 57 (H) 08/08/2016    PROTIME 15 9 (H) 10/30/2017    PROTIME 27 2 (H) 08/08/2016

## 2018-12-10 NOTE — LETTER
December 10, 2018     Florentin Simmons MD  [address],  [address] [address]    Patient: Erika Condon   YOB: 1970   Date of Visit: 12/10/2018       Dear Dr Bassam Nam: Thank you for referring Erika Condon to me for evaluation  Below are my notes for this consultation  If you have questions, please do not hesitate to call me  I look forward to following your patient along with you  Sincerely,        Maggie Montero MD        CC: No Recipients  Maggie Montero MD  12/10/2018  5:21 PM  Sign at close encounter  Consultation - [address] [facility]  Erika Condon 1970 46 y o  female     ASSESSMENT @ PLAN:   He is a 73-year-old female with a stool Hemoccult positive microcytic iron-deficient anemia for year and half  She has severe RSD that is worsened with bowel movements so we talked about this at length  She has known gastroesophageal reflux disease that is under control on omeprazole  1 will do EGD and colonoscopy to investigate; I told her not to do the Dulcolax and to take dicyclomine prior to the bowel preparation    2 she will continue on her omeprazole    3 the endoscopy and colonoscopy are non revealing we will do a video capsule endoscopy    Chief Complaint:   Stool Hemoccult positive iron deficiency anemia    HPI:   She is a 73-year-old female that is here for stool Hemoccult positive result  In reviewing her labs she she has been iron deficient and had a microcytosis and anemia for over a year and a half  She has known hemorrhoids that do bleed at times  She has not had any melena  She has chronic gastroesophageal reflux disease that is under control on omeprazole  When she does not take it she has severe regurgitation and heartburn  She has no melena  She has normal bowel movements twice a day  She is on sulfasalazine for her autoimmune arthritis    She had a hysterectomy and so she does not have a menses  She has a severe reflexive nerve pain that is worsened when she has bowel movements so we spent a lot of the visit talking about the bowel preparation in strategies to minimize this problem  She reports that she was having a lot of gastrointestinal upset and bloating from recent arthritis medication and she stopped it  She has never had endoscopy or colonoscopy  REVIEW OF SYSTEMS:     CONSTITUTIONAL: Denies any fever, chills, or rigors  Good appetite, and no recent weight loss  HEENT: No earache or tinnitus  Denies hearing loss or visual disturbances  CARDIOVASCULAR: No chest pain or palpitations  RESPIRATORY: Denies any cough, hemoptysis, shortness of breath or dyspnea on exertion  GASTROINTESTINAL: As noted in the History of Present Illness  GENITOURINARY: No problems with urination  Denies any hematuria or dysuria  NEUROLOGIC: No dizziness or vertigo, denies headaches  MUSCULOSKELETAL: Denies any muscle or joint pain  SKIN: Denies skin rashes or itching  ENDOCRINE: Denies excessive thirst  Denies intolerance to heat or cold  PSYCHOSOCIAL: Denies depression or anxiety  Denies any recent memory loss       Past Medical History:   Diagnosis Date    Arthritis     CRPS (complex regional pain syndrome)     [facility]     Inflammatory spondylopathies (HCC)     Nonmelanoma skin cancer     LAST ASSESSED: [id number]    PE (pulmonary thromboembolism) ([facility])     PONV (postoperative nausea and vomiting)     Squamous cell carcinoma     Urinary incontinence     Uterine leiomyoma     LAST ASSESSED: [id number]      Past Surgical History:   Procedure Laterality Date    ANKLE SURGERY      BACK SURGERY  2018    BELOW KNEE LEG AMPUTATION      LAST ASSESSED: [id number]     SECTION      HYSTERECTOMY      LAST ASSESSED: [id number]    OTHER SURGICAL HISTORY      SPINAL STEROTAXIS STIMULATION OF CORD; LAST ASSESSED: [id number]    ND LAP,RMV  ADNEXAL STRUCTURE N/A 11/10/2017    Procedure: LAPAROSCOPIC REMOVAL OF BILATERAL OVARIES;  LYSIS OF ADHESIONS;  Surgeon: Inna Peres MD;  Location: [facility];  Service: Gynecology    SINUS SURGERY       Social History     Social History    Marital status: /Civil Union     Spouse name: N/A    Number of children: N/A    Years of education: N/A     Occupational History    Retired      Social History Main Topics    Smoking status: Never Smoker    Smokeless tobacco: Never Used    Alcohol use Yes      Comment: occasional alcohol use as per all scripts     Drug use: No    Sexual activity: Yes     Other Topics Concern    Not on file     Social History Narrative    No narrative on file     Family History   Problem Relation Age of Onset    Hypertension Mother     Heart disease Mother     Coronary artery disease Mother         Due to calcified coronary lesion     Diabetes Father         Type 2, controlled with neuropathy     Vitiligo Brother     Breast cancer Cousin     Breast cancer Cousin     Breast cancer Other     Vitiligo Daughter      Penicillins; Ciprofloxacin; Codeine; and Morphine  Current Outpatient Prescriptions   Medication Sig Dispense Refill    b complex vitamins capsule Take 1 capsule by mouth daily      buPROPion (WELLBUTRIN XL) 150 mg 24 hr tablet Take 1 tablet (150 mg total) by mouth daily 30 tablet 1    calcium carbonate (OS-APRYL) 600 MG tablet Take 600 mg by mouth 2 (two) times a day with meals      Cholecalciferol (VITAMIN D3) 5000 units CAPS Take by mouth      folic acid (FOLVITE) 1 mg tablet Take 3 tablets by mouth daily        glucosamine-chondroitin 500-400 MG tablet Take 1 tablet by mouth 3 (three) times a day      ketoconazole (NIZORAL) 2 % cream Apply topically daily 30 g 2    LORazepam (ATIVAN) 0 5 mg tablet Take 1 tablet (0 5 mg total) by mouth daily at bedtime 30 tablet 0    methotrexate 2 5 mg tablet Take 2 5 mg by mouth once a week 6 pills weekly       NUCYNTA 50 MG tablet       omeprazole (PriLOSEC) 20 mg delayed release capsule Take 20 mg by mouth daily      polyethylene glycol-propylene glycol (SYSTANE) 0 4-0 3 % Apply to eye      predniSONE 10 mg tablet Take 3 tablets for 3 days then 2 tablets for 3 days then 1 tablet for 3 days 18 tablet 0    Probiotic Product (PROBIOTIC-10) CAPS Take by mouth      rosuvastatin (CRESTOR) 10 MG tablet TAKE 1 TABLET BY MOUTH EVERY DAY 30 tablet 4    sertraline (ZOLOFT) 100 mg tablet Take 1/2 tab daily 30 tablet 0    sulfaSALAzine (AZULFIDINE) 500 mg tablet Take 500 mg by mouth 2 (two) times a day 3 tabs bid        Tapentadol HCl (NUCYNTA PO) Take by mouth      XARELTO 20 MG tablet TAKE ONE TABLET BY MOUTH EVERY DAY 90 tablet 3    albuterol (2 5 mg/3 mL) 0 083 % nebulizer solution Take 1 vial (2 5 mg total) by nebulization every 6 (six) hours as needed for wheezing or shortness of breath (Patient not taking: Reported on 12/10/2018 ) 45 mL 0    albuterol (PROAIR HFA) 90 mcg/act inhaler Inhale 2 puffs every 6 (six) hours as needed for wheezing (Patient not taking: Reported on 12/10/2018 ) 8 5 g 0    diazepam (VALIUM) 5 mg tablet TAKE 1 TABLET BY MOUTH EVERY 6 HOURS AS NEEDED FOR MUSCLE SPASM  0    dicyclomine (BENTYL) 20 mg tablet Take 1 tablet (20 mg total) by mouth 3 (three) times a day as needed (abdominal pain) 10 tablet 0    ferrous gluconate (FERGON) 324 mg tablet Take 1 tablet by mouth 2 (two) times a day with meals  0    leflunomide (ARAVA) 10 MG tablet Take 20 mg by mouth daily      mometasone (NASONEX) 50 mcg/act nasal spray into each nostril      olopatadine HCl (PATADAY) 0 2 % opth drops Apply to eye      rivaroxaban (XARELTO) 10 mg tablet Take 10 mg by mouth daily       No current facility-administered medications for this visit  Blood pressure 130/84, pulse 104, weight 108 kg (238 lb 2 oz)      PHYSICAL EXAM:     General Appearance:   Alert, cooperative, no distress, appears stated age    HEENT:   Normocephalic, atraumatic, anicteric      Neck:  Supple, symmetrical, trachea midline, no adenopathy;    thyroid: no enlargement/tenderness/nodules; no carotid  bruit or JVD    Lungs:   Clear to auscultation bilaterally; no rales, rhonchi or wheezing; respirations unlabored    Heart[de-identified]   S1 and S2 normal; regular rate and rhythm; no murmur, rub, or gallop     Abdomen:   Soft, non-tender, non-distended; normal bowel sounds; no masses, no organomegaly    Genitalia:   Deferred    Rectal:   Deferred    Extremities:  No cyanosis, clubbing or edema    Pulses:  2+ and symmetric all extremities    Skin:  Skin color, texture, turgor normal, no rashes or lesions    Lymph nodes:  No palpable cervical, axillary or inguinal lymphadenopathy        Lab Results   Component Value Date    WBC 9 40 11/24/2018    HGB 11 0 (L) 11/24/2018    HCT 36 4 11/24/2018    MCV 74 (L) 11/24/2018     11/24/2018     Lab Results   Component Value Date    GLUCOSE 117 (H) 02/13/2017    CALCIUM 9 5 11/24/2018     02/13/2017    K 4 5 11/24/2018    CO2 26 11/24/2018     11/24/2018    BUN 11 11/24/2018    CREATININE 0 67 11/24/2018     Lab Results   Component Value Date    ALT 36 11/24/2018    AST 23 11/24/2018    ALKPHOS 66 11/24/2018    BILITOT <0 2 02/13/2017     Lab Results   Component Value Date    INR 1 26 (H) 10/30/2017    INR 2 57 (H) 08/08/2016    PROTIME 15 9 (H) 10/30/2017    PROTIME 27 2 (H) 08/08/2016

## 2018-12-11 PROBLEM — K21.9 GASTROESOPHAGEAL REFLUX DISEASE WITHOUT ESOPHAGITIS: Status: ACTIVE | Noted: 2018-12-11

## 2018-12-11 PROBLEM — D50.9 IRON DEFICIENCY ANEMIA: Status: ACTIVE | Noted: 2018-12-11

## 2018-12-11 PROBLEM — K21.9 GASTROESOPHAGEAL REFLUX DISEASE: Status: ACTIVE | Noted: 2018-12-11

## 2018-12-17 ENCOUNTER — OFFICE VISIT (OUTPATIENT)
Dept: INTERNAL MEDICINE CLINIC | Facility: CLINIC | Age: 48
End: 2018-12-17
Payer: MEDICARE

## 2018-12-17 VITALS
RESPIRATION RATE: 16 BRPM | WEIGHT: 239.4 LBS | SYSTOLIC BLOOD PRESSURE: 120 MMHG | DIASTOLIC BLOOD PRESSURE: 80 MMHG | HEART RATE: 88 BPM | BODY MASS INDEX: 42.42 KG/M2 | HEIGHT: 63 IN

## 2018-12-17 DIAGNOSIS — D50.0 IRON DEFICIENCY ANEMIA DUE TO CHRONIC BLOOD LOSS: ICD-10-CM

## 2018-12-17 DIAGNOSIS — Z12.39 SCREENING FOR BREAST CANCER: ICD-10-CM

## 2018-12-17 DIAGNOSIS — R73.01 IMPAIRED FASTING GLUCOSE: Primary | ICD-10-CM

## 2018-12-17 DIAGNOSIS — G90.50 RSD (REFLEX SYMPATHETIC DYSTROPHY): ICD-10-CM

## 2018-12-17 DIAGNOSIS — Z15.89 HLA B27 (HLA B27 POSITIVE): ICD-10-CM

## 2018-12-17 DIAGNOSIS — E78.5 HYPERLIPIDEMIA, UNSPECIFIED HYPERLIPIDEMIA TYPE: ICD-10-CM

## 2018-12-17 DIAGNOSIS — M35.9 AUTOIMMUNE DISEASE (HCC): ICD-10-CM

## 2018-12-17 DIAGNOSIS — F32.A DEPRESSION, UNSPECIFIED DEPRESSION TYPE: ICD-10-CM

## 2018-12-17 DIAGNOSIS — K21.9 GASTROESOPHAGEAL REFLUX DISEASE WITHOUT ESOPHAGITIS: ICD-10-CM

## 2018-12-17 PROCEDURE — 99214 OFFICE O/P EST MOD 30 MIN: CPT | Performed by: NURSE PRACTITIONER

## 2018-12-17 RX ORDER — BUPROPION HYDROCHLORIDE 300 MG/1
300 TABLET ORAL EVERY MORNING
Qty: 30 TABLET | Refills: 2 | Status: SHIPPED | OUTPATIENT
Start: 2018-12-17 | End: 2019-03-12 | Stop reason: SDUPTHER

## 2018-12-17 NOTE — PROGRESS NOTES
Assessment/Plan:    Patient Instructions   Status post acute bronchitis and questionable side effects related to the Leflunomide- she did discuss with Rheumatology who agreed that acute bronchitis, diarrhea, hypertension can all be common side effects of the medication and has since been discontinued  She is considering DMARDs for the future  Depression chronic pain we considered Cymbalta but were worried about issues with orgasm, well she taking Wellbutrin 150 mg she has increase in motivation but not significantly therefore we will increase to 300 mg, continue Zoloft 50 mg but consider weaning down  Check labs in the spring follow-up subsequently    Positive Hemoccult stool scheduled for colonoscopy EGD this week         Diagnoses and all orders for this visit:    Impaired fasting glucose  -     Hemoglobin A1C; Future    Depression, unspecified depression type  -     buPROPion (WELLBUTRIN XL) 300 mg 24 hr tablet; Take 1 tablet (300 mg total) by mouth every morning    Gastroesophageal reflux disease without esophagitis    Iron deficiency anemia due to chronic blood loss  -     CBC and differential; Future  -     Comprehensive metabolic panel; Future    Hyperlipidemia, unspecified hyperlipidemia type  -     Lipid panel; Future  -     TSH, 3rd generation with Free T4 reflex; Future    Screening for breast cancer  -     Mammo screening bilateral w 3d & cad; Future    [facility] )    HLA B27 (HLA B27 positive)    RSD (reflex sympathetic dystrophy)         Subjective:      Patient ID: Yovani Donahue is a 46 y o  female    Patient is finally starting to feel better but still just run down  Still for suffers from chronic pain both arthralgias and related to her RSD  She states there are times that she just cannot get out of bed could she is incapacitated with pain she reserves her Nucynta for only severe days and that is very rare    She is considering DMARD therapy  She states that she still has a hard time getting up both emotionally and physically but when she does that we she is getting stuff done  Taking Wellbutrin in the morning          Current Outpatient Prescriptions:     b complex vitamins capsule, Take 1 capsule by mouth daily, Disp: , Rfl:     buPROPion (WELLBUTRIN XL) 300 mg 24 hr tablet, Take 1 tablet (300 mg total) by mouth every morning, Disp: 30 tablet, Rfl: 2    calcium carbonate (OS-APRYL) 600 MG tablet, Take 600 mg by mouth 2 (two) times a day with meals, Disp: , Rfl:     Cholecalciferol (VITAMIN D3) 5000 units CAPS, Take by mouth, Disp: , Rfl:     diazepam (VALIUM) 5 mg tablet, TAKE 1 TABLET BY MOUTH EVERY 6 HOURS AS NEEDED FOR MUSCLE SPASM, Disp: , Rfl: 0    dicyclomine (BENTYL) 20 mg tablet, Take 1 tablet (20 mg total) by mouth 3 (three) times a day as needed (abdominal pain), Disp: 10 tablet, Rfl: 0    folic acid (FOLVITE) 1 mg tablet, Take 3 tablets by mouth daily  , Disp: , Rfl:     glucosamine-chondroitin 500-400 MG tablet, Take 1 tablet by mouth 3 (three) times a day, Disp: , Rfl:     ketoconazole (NIZORAL) 2 % cream, Apply topically daily, Disp: 30 g, Rfl: 2    LORazepam (ATIVAN) 0 5 mg tablet, Take 1 tablet (0 5 mg total) by mouth daily at bedtime, Disp: 30 tablet, Rfl: 0    methotrexate 2 5 mg tablet, Take 2 5 mg by mouth once a week 6 pills weekly , Disp: , Rfl:     NUCYNTA 50 MG tablet, , Disp: , Rfl:     omeprazole (PriLOSEC) 20 mg delayed release capsule, Take 20 mg by mouth daily, Disp: , Rfl:     Probiotic Product (PROBIOTIC-10) CAPS, Take by mouth, Disp: , Rfl:     rosuvastatin (CRESTOR) 10 MG tablet, TAKE 1 TABLET BY MOUTH EVERY DAY, Disp: 30 tablet, Rfl: 4    sertraline (ZOLOFT) 100 mg tablet, Take 1/2 tab daily, Disp: 30 tablet, Rfl: 0    sulfaSALAzine (AZULFIDINE) 500 mg tablet, Take 500 mg by mouth 2 (two) times a day 3 tabs bid  , Disp: , Rfl:     Recent Results (from the past 1008 hour(s))   Occult Blood, Fecal Immunochemical    Collection Time: 11/12/18 3:14 PM   Result Value Ref Range    OCCULT BLD, FECAL IMMUNOLOGICAL Positive (A) Negative   Comprehensive metabolic panel    Collection Time: 11/24/18  8:34 PM   Result Value Ref Range    Sodium 140 136 - 145 mmol/L    Potassium 4 5 3 5 - 5 3 mmol/L    Chloride 103 100 - 108 mmol/L    CO2 26 21 - 32 mmol/L    ANION GAP 11 4 - 13 mmol/L    BUN 11 5 - 25 mg/dL    Creatinine 0 67 0 60 - 1 30 mg/dL    Glucose 119 65 - 140 mg/dL    Calcium 9 5 8 3 - 10 1 mg/dL    AST 23 5 - 45 U/L    ALT 36 12 - 78 U/L    Alkaline Phosphatase 66 46 - 116 U/L    Total Protein 7 7 6 4 - 8 2 g/dL    Albumin 3 7 3 5 - 5 0 g/dL    Total Bilirubin 0 10 (L) 0 20 - 1 00 mg/dL    eGFR 103 ml/min/1 73sq m   CBC and differential    Collection Time: 11/24/18  8:34 PM   Result Value Ref Range    WBC 9 40 4 31 - 10 16 Thousand/uL    RBC 4 91 3 81 - 5 12 Million/uL    Hemoglobin 11 0 (L) 11 5 - 15 4 g/dL    Hematocrit 36 4 34 8 - 46 1 %    MCV 74 (L) 82 - 98 fL    MCH 22 4 (L) 26 8 - 34 3 pg    MCHC 30 2 (L) 31 4 - 37 4 g/dL    RDW 17 2 (H) 11 6 - 15 1 %    MPV 11 8 8 9 - 12 7 fL    Platelets 505 164 - 648 Thousands/uL    nRBC 0 /100 WBCs    Neutrophils Relative 77 (H) 43 - 75 %    Immat GRANS % 0 0 - 2 %    Lymphocytes Relative 15 14 - 44 %    Monocytes Relative 6 4 - 12 %    Eosinophils Relative 1 0 - 6 %    Basophils Relative 1 0 - 1 %    Neutrophils Absolute 7 22 1 85 - 7 62 Thousands/µL    Immature Grans Absolute 0 03 0 00 - 0 20 Thousand/uL    Lymphocytes Absolute 1 44 0 60 - 4 47 Thousands/µL    Monocytes Absolute 0 59 0 17 - 1 22 Thousand/µL    Eosinophils Absolute 0 06 0 00 - 0 61 Thousand/µL    Basophils Absolute 0 06 0 00 - 0 10 Thousands/µL   Troponin I    Collection Time: 11/24/18  8:34 PM   Result Value Ref Range    Troponin I <0 02 <=0 04 ng/mL       The following portions of the patient's history were reviewed and updated as appropriate: allergies, current medications, past family history, past medical history, past social history, past surgical history and problem list      Review of Systems   Constitutional: Negative for appetite change, chills, diaphoresis, fatigue, fever and unexpected weight change  HENT: Negative for postnasal drip and sneezing  Eyes: Negative for visual disturbance  Respiratory: Negative for chest tightness and shortness of breath  Cardiovascular: Negative for chest pain, palpitations and leg swelling  Gastrointestinal: Negative for abdominal pain and blood in stool  Endocrine: Negative for cold intolerance, heat intolerance, polydipsia, polyphagia and polyuria  Genitourinary: Negative for difficulty urinating, dysuria, frequency and urgency  Musculoskeletal: Positive for arthralgias  Negative for myalgias  Skin: Negative for rash and wound  Neurological: Negative for dizziness, weakness, light-headedness and headaches  Hematological: Negative for adenopathy  Psychiatric/Behavioral: Negative for confusion, dysphoric mood and sleep disturbance  The patient is not nervous/anxious  Objective:      /80 (BP Location: Left arm, Patient Position: Sitting, Cuff Size: Standard)   Pulse 88   Resp 16   Ht 5' 3" (1 6 m)   Wt 109 kg (239 lb 6 4 oz)   BMI 42 41 kg/m²        Physical Exam   Constitutional: She is oriented to person, place, and time  She appears well-developed  No distress  HENT:   Head: Normocephalic and atraumatic  Nose: Nose normal    Mouth/Throat: Oropharynx is clear and moist    Eyes: Pupils are equal, round, and reactive to light  Conjunctivae and EOM are normal    Neck: Normal range of motion  Neck supple  No JVD present  No tracheal deviation present  No thyromegaly present  Cardiovascular: Normal rate, regular rhythm, normal heart sounds and intact distal pulses  Exam reveals no gallop and no friction rub  No murmur heard  Pulmonary/Chest: Effort normal and breath sounds normal  No respiratory distress  She has no wheezes  She has no rales  Abdominal: Soft  Bowel sounds are normal  She exhibits no distension  There is no tenderness  Musculoskeletal: Normal range of motion  She exhibits no edema  Lymphadenopathy:     She has no cervical adenopathy  Neurological: She is alert and oriented to person, place, and time  No cranial nerve deficit  Skin: Skin is warm and dry  No rash noted  She is not diaphoretic  Psychiatric: She has a normal mood and affect   Her behavior is normal  Judgment and thought content normal

## 2018-12-17 NOTE — PATIENT INSTRUCTIONS
Status post acute bronchitis and questionable side effects related to the Leflunomide- she did discuss with Rheumatology who agreed that acute bronchitis, diarrhea, hypertension can all be common side effects of the medication and has since been discontinued  She is considering DMARDs for the future  Depression chronic pain we considered Cymbalta but were worried about issues with orgasm, well she taking Wellbutrin 150 mg she has increase in motivation but not significantly therefore we will increase to 300 mg, continue Zoloft 50 mg but consider weaning down      Check labs in the spring follow-up subsequently    Positive Hemoccult stool scheduled for colonoscopy EGD this week

## 2018-12-18 ENCOUNTER — ANESTHESIA EVENT (OUTPATIENT)
Dept: PERIOP | Facility: HOSPITAL | Age: 48
End: 2018-12-18
Payer: MEDICARE

## 2018-12-19 ENCOUNTER — HOSPITAL ENCOUNTER (OUTPATIENT)
Facility: HOSPITAL | Age: 48
Setting detail: OUTPATIENT SURGERY
Discharge: HOME | End: 2018-12-19
Attending: INTERNAL MEDICINE | Admitting: INTERNAL MEDICINE
Payer: MEDICARE

## 2018-12-19 ENCOUNTER — ANESTHESIA (OUTPATIENT)
Dept: PERIOP | Facility: HOSPITAL | Age: 48
End: 2018-12-19
Payer: MEDICARE

## 2018-12-19 VITALS
BODY MASS INDEX: 41.76 KG/M2 | SYSTOLIC BLOOD PRESSURE: 125 MMHG | HEART RATE: 76 BPM | OXYGEN SATURATION: 97 % | TEMPERATURE: 98.2 F | DIASTOLIC BLOOD PRESSURE: 73 MMHG | HEIGHT: 63 IN | WEIGHT: 235.67 LBS | RESPIRATION RATE: 17 BRPM

## 2018-12-19 DIAGNOSIS — D50.0 IRON DEFICIENCY ANEMIA DUE TO CHRONIC BLOOD LOSS: Primary | ICD-10-CM

## 2018-12-19 DIAGNOSIS — K21.9 GASTROESOPHAGEAL REFLUX DISEASE, ESOPHAGITIS PRESENCE NOT SPECIFIED: ICD-10-CM

## 2018-12-19 DIAGNOSIS — D50.9 IRON DEFICIENCY ANEMIA, UNSPECIFIED IRON DEFICIENCY ANEMIA TYPE: ICD-10-CM

## 2018-12-19 PROCEDURE — 43251 EGD REMOVE LESION SNARE: CPT | Performed by: INTERNAL MEDICINE

## 2018-12-19 PROCEDURE — 88305 TISSUE EXAM BY PATHOLOGIST: CPT | Performed by: PATHOLOGY

## 2018-12-19 PROCEDURE — 43239 EGD BIOPSY SINGLE/MULTIPLE: CPT | Performed by: INTERNAL MEDICINE

## 2018-12-19 PROCEDURE — 45385 COLONOSCOPY W/LESION REMOVAL: CPT | Performed by: INTERNAL MEDICINE

## 2018-12-19 RX ORDER — LIDOCAINE HYDROCHLORIDE 10 MG/ML
INJECTION, SOLUTION INFILTRATION; PERINEURAL AS NEEDED
Status: DISCONTINUED | OUTPATIENT
Start: 2018-12-19 | End: 2018-12-19 | Stop reason: SURG

## 2018-12-19 RX ORDER — PROPOFOL 10 MG/ML
INJECTION, EMULSION INTRAVENOUS AS NEEDED
Status: DISCONTINUED | OUTPATIENT
Start: 2018-12-19 | End: 2018-12-19 | Stop reason: SURG

## 2018-12-19 RX ORDER — PANTOPRAZOLE SODIUM 40 MG/1
40 TABLET, DELAYED RELEASE ORAL DAILY
Qty: 30 TABLET | Refills: 2 | Status: SHIPPED | OUTPATIENT
Start: 2018-12-19 | End: 2019-11-07

## 2018-12-19 RX ORDER — SODIUM CHLORIDE, SODIUM LACTATE, POTASSIUM CHLORIDE, CALCIUM CHLORIDE 600; 310; 30; 20 MG/100ML; MG/100ML; MG/100ML; MG/100ML
125 INJECTION, SOLUTION INTRAVENOUS CONTINUOUS
Status: DISCONTINUED | OUTPATIENT
Start: 2018-12-19 | End: 2018-12-19 | Stop reason: HOSPADM

## 2018-12-19 RX ORDER — ONDANSETRON 2 MG/ML
4 INJECTION INTRAMUSCULAR; INTRAVENOUS ONCE
Status: COMPLETED | OUTPATIENT
Start: 2018-12-19 | End: 2018-12-19

## 2018-12-19 RX ADMIN — PROPOFOL 40 MG: 10 INJECTION, EMULSION INTRAVENOUS at 12:11

## 2018-12-19 RX ADMIN — PROPOFOL 40 MG: 10 INJECTION, EMULSION INTRAVENOUS at 12:25

## 2018-12-19 RX ADMIN — PROPOFOL 40 MG: 10 INJECTION, EMULSION INTRAVENOUS at 12:32

## 2018-12-19 RX ADMIN — SODIUM CHLORIDE, SODIUM LACTATE, POTASSIUM CHLORIDE, AND CALCIUM CHLORIDE: .6; .31; .03; .02 INJECTION, SOLUTION INTRAVENOUS at 11:52

## 2018-12-19 RX ADMIN — ONDANSETRON 4 MG: 2 INJECTION INTRAMUSCULAR; INTRAVENOUS at 13:40

## 2018-12-19 RX ADMIN — LIDOCAINE HYDROCHLORIDE 50 MG: 10 INJECTION, SOLUTION INFILTRATION; PERINEURAL at 11:57

## 2018-12-19 RX ADMIN — PROPOFOL 40 MG: 10 INJECTION, EMULSION INTRAVENOUS at 12:16

## 2018-12-19 RX ADMIN — PROPOFOL 40 MG: 10 INJECTION, EMULSION INTRAVENOUS at 12:08

## 2018-12-19 RX ADMIN — PROPOFOL 40 MG: 10 INJECTION, EMULSION INTRAVENOUS at 12:28

## 2018-12-19 RX ADMIN — PROPOFOL 40 MG: 10 INJECTION, EMULSION INTRAVENOUS at 12:14

## 2018-12-19 RX ADMIN — PROPOFOL 40 MG: 10 INJECTION, EMULSION INTRAVENOUS at 12:05

## 2018-12-19 RX ADMIN — PROPOFOL 30 MG: 10 INJECTION, EMULSION INTRAVENOUS at 12:01

## 2018-12-19 RX ADMIN — PROPOFOL 30 MG: 10 INJECTION, EMULSION INTRAVENOUS at 12:18

## 2018-12-19 RX ADMIN — PROPOFOL 40 MG: 10 INJECTION, EMULSION INTRAVENOUS at 12:22

## 2018-12-19 RX ADMIN — PROPOFOL 150 MG: 10 INJECTION, EMULSION INTRAVENOUS at 11:59

## 2018-12-19 RX ADMIN — PROPOFOL 30 MG: 10 INJECTION, EMULSION INTRAVENOUS at 12:03

## 2018-12-19 NOTE — ANESTHESIA PREPROCEDURE EVALUATION
Review of Systems/Medical History  Patient summary reviewed    History of anesthetic complications PONV    Cardiovascular  Hyperlipidemia, DVT  PE,  Pulmonary  Negative pulmonary ROS        GI/Hepatic  Negative GI/hepatic ROS   GERD ,        Negative  ROS        Endo/Other  Negative endo/other ROS   Obesity  morbid obesity   GYN  Negative gynecology ROS          Hematology  Anemia ,     Musculoskeletal  Negative musculoskeletal ROS   Comment: Left BKA Arthritis     Neurology  Negative neurology ROS   Neuromuscular disease (CRPS) ,    Psychology   Depression ,              Physical Exam    Airway    Mallampati score: III  TM Distance: >3 FB  Neck ROM: full     Dental       Cardiovascular  Cardiovascular exam normal    Pulmonary  Pulmonary exam normal     Other Findings        Anesthesia Plan  ASA Score- 3     Anesthesia Type- IV sedation with anesthesia with ASA Monitors  Additional Monitors:   Airway Plan:         Plan Factors-    Induction- intravenous  Postoperative Plan-     Informed Consent- Anesthetic plan and risks discussed with patient

## 2018-12-19 NOTE — DISCHARGE INSTR - AVS FIRST PAGE
ESOPHAGOGASTRODUODENOSCOPY    PROCEDURE: EGD/ Polypectomy (Snare Cautery)/biopsy    INDICATIONS: Iron Deficiency Anaemia    POST-OP DIAGNOSIS: See the impression below    SEDATION: Monitored anesthesia care, check anesthesia records    PHYSICAL EXAM:  Vitals:    12/19/18 1129   BP: 121/75   Pulse: 81   Resp: (!) 24   Temp: 98 3 °F (36 8 °C)   SpO2: 96%     Body mass index is 41 75 kg/m²  General: NAD  Heart: S1 & S2 normal, RRR  Lungs: CTA, No rales or rhonchi  Abdomen: Soft, nontender, nondistended, good bowel sounds    CONSENT:  Informed consent was obtained for the procedure, including sedation after explaining the risks and benefits of the procedure  Risks including but not limited to bleeding, perforation, infection, aspiration were discussed in detail  Also explained about less than 100% sensitivity with the exam and other alternatives  PREPARATION:   EKG tracing, pulse oximetry, blood pressure were monitored throughout the procedure  Patient was identified by myself both verbally and by visual inspection of ID band  DESCRIPTION:   Patient was placed in the left lateral decubitus position and was sedated with the above medication  The gastroscope was introduced in to the oropharynx and the esophagus was intubated under direct visualization  Scope was passed down the esophagus up to 2nd part of the duodenum  A careful inspection was made as the gastroscope was withdrawn, including a retroflexed view of the stomach; findings and interventions are described below  The blood loss was minimal     FINDINGS:    #1  Esophagus and GEJ- esophageal body appeared normal   The Z-line appeared normal   A small hiatal hernia was noted  #2  Stomach- a few fundal gland polyps were noted  Two ulcerated fundal gland polyps removed with cold snare polypectomy and hot snare polypectomy  The specimens were retrieved with a [employer]  Multiple random biopsies of the antrum were obtained      #3  Duodenum- the bulb and 2nd portion of the duodenum appeared normal   Multiple random biopsies were obtained  IMPRESSIONS:    Ulcerated fundal gland polyp status post snare polypectomy    RECOMMENDATIONS:   PPI   Await pathology  Reflux precautions      COMPLICATIONS:  None; patient tolerated the procedure well  DISPOSITION: PACU  CONDITION: Stable    Nico Collier MD  12/19/2018,12:08 PM    ESOPHAGOGASTRODUODENOSCOPY    PROCEDURE: EGD/ Polypectomy (Snare Cautery)/biopsy    INDICATIONS: Iron Deficiency Anaemia    POST-OP DIAGNOSIS: See the impression below    SEDATION: Monitored anesthesia care, check anesthesia records    PHYSICAL EXAM:  Vitals:    12/19/18 1129   BP: 121/75   Pulse: 81   Resp: (!) 24   Temp: 98 3 °F (36 8 °C)   SpO2: 96%     Body mass index is 41 75 kg/m²  General: NAD  Heart: S1 & S2 normal, RRR  Lungs: CTA, No rales or rhonchi  Abdomen: Soft, nontender, nondistended, good bowel sounds    CONSENT:  Informed consent was obtained for the procedure, including sedation after explaining the risks and benefits of the procedure  Risks including but not limited to bleeding, perforation, infection, aspiration were discussed in detail  Also explained about less than 100% sensitivity with the exam and other alternatives  PREPARATION:   EKG tracing, pulse oximetry, blood pressure were monitored throughout the procedure  Patient was identified by myself both verbally and by visual inspection of ID band  DESCRIPTION:   Patient was placed in the left lateral decubitus position and was sedated with the above medication  The gastroscope was introduced in to the oropharynx and the esophagus was intubated under direct visualization  Scope was passed down the esophagus up to 2nd part of the duodenum  A careful inspection was made as the gastroscope was withdrawn, including a retroflexed view of the stomach; findings and interventions are described below     The blood loss was minimal     FINDINGS:    #1  Esophagus and GEJ- esophageal body appeared normal   The Z-line appeared normal   A small hiatal hernia was noted  #2  Stomach- a few fundal gland polyps were noted  Two ulcerated fundal gland polyps removed with cold snare polypectomy and hot snare polypectomy  The specimens were retrieved with a [employer]  Multiple random biopsies of the antrum were obtained  #3  Duodenum- the bulb and 2nd portion of the duodenum appeared normal   Multiple random biopsies were obtained  IMPRESSIONS:    Ulcerated fundal gland polyp status post snare polypectomy    RECOMMENDATIONS:   PPI   Await pathology  Reflux precautions      COMPLICATIONS:  None; patient tolerated the procedure well  DISPOSITION: PACU  CONDITION: Stable    Radha Navarro MD  12/19/2018,12:08 PM    COLONOSCOPY    PROCEDURE: Colonoscopy/ Polypectomy (Snare Cautery); resolution clip; submucosal saline and epinephrine injection    INDICATIONS: Anemia, Iron Deficiency    POST-OP DIAGNOSIS: See the impression below    SEDATION: Monitored anesthesia care, check anesthesia records    PHYSICAL EXAM:  Vitals:    12/19/18 1129   BP: 121/75   Pulse: 81   Resp: (!) 24   Temp: 98 3 °F (36 8 °C)   SpO2: 96%     Body mass index is 41 75 kg/m²  General: NAD  Heart: S1 & S2 normal, RRR  Lungs: CTA, No rales or rhonchi  Abdomen: Soft, nontender, nondistended, good bowel sounds    CONSENT:  Informed consent was obtained for the procedure, including sedation after explaining the risks and benefits of the procedure  Risks including but not limited to bleeding, perforation, infection, aspiration were discussed in detail  Also explained about less than 100%$ sensitivity with the exam and other alternatives  PREPARATION:   EKG tracing, pulse oximetry, blood pressure were monitored throughout the procedure  Patient was identified by myself both verbally and by visual inspection of ID band      DESCRIPTION:   Patient was placed in the left lateral decubitus position and was sedated with the above medication  Digital rectal examination was performed  The colonoscope was introduced in to the anal canal and advanced up to cecum, which was identified by the appendiceal orifice and IC valve  A careful inspection was made as the colonoscope was withdrawn, including a retroflexed view of the rectum; findings and interventions are described below  Appropriate photodocumentation was obtained  The quality of the colonic preparation was excellent  The blood loss was minimal     FINDINGS:  A large pedunculated polyp measuring 3 cm was noted in the distal transverse colon and was removed with snare cautery  The polyp site was bleeding and was injected with 3 mL of [id number] epinephrine and 2 resolution clips were deployed and hemostasis was achieved  A 2 cm semi pedunculated polyp was removed with snare cautery polypectomy at the splenic flexure  A large ulcerated fronds like 5 cm polyp was removed from a pedunculated stalk at the splenic flexure with snare cautery polypectomy  Sigmoid diverticulosis was noted the rectum was normal; the cecum was normal          IMPRESSIONS:    Multiple large polyp status post snare cautery polypectomy  Submucosal is a epinephrine injection and resolution clipping of polyp stalk    RECOMMENDATIONS:  Await pathology  Repeat colonoscopy in 1 year  High-fiber diet    COMPLICATIONS:  None; patient tolerated the procedure well    DISPOSITION: PACU  CONDITION: Stable    Sheila Dickson MD  12/19/2018,12:38 PM

## 2018-12-19 NOTE — OP NOTE
ESOPHAGOGASTRODUODENOSCOPY    PROCEDURE: EGD/ Polypectomy (Snare Cautery)/biopsy    INDICATIONS: Iron Deficiency Anaemia    POST-OP DIAGNOSIS: See the impression below    SEDATION: Monitored anesthesia care, check anesthesia records    PHYSICAL EXAM:  Vitals:    12/19/18 1129   BP: 121/75   Pulse: 81   Resp: (!) 24   Temp: 98 3 °F (36 8 °C)   SpO2: 96%     Body mass index is 41 75 kg/m²  General: NAD  Heart: S1 & S2 normal, RRR  Lungs: CTA, No rales or rhonchi  Abdomen: Soft, nontender, nondistended, good bowel sounds    CONSENT:  Informed consent was obtained for the procedure, including sedation after explaining the risks and benefits of the procedure  Risks including but not limited to bleeding, perforation, infection, aspiration were discussed in detail  Also explained about less than 100% sensitivity with the exam and other alternatives  PREPARATION:   EKG tracing, pulse oximetry, blood pressure were monitored throughout the procedure  Patient was identified by myself both verbally and by visual inspection of ID band  DESCRIPTION:   Patient was placed in the left lateral decubitus position and was sedated with the above medication  The gastroscope was introduced in to the oropharynx and the esophagus was intubated under direct visualization  Scope was passed down the esophagus up to 2nd part of the duodenum  A careful inspection was made as the gastroscope was withdrawn, including a retroflexed view of the stomach; findings and interventions are described below  The blood loss was minimal     FINDINGS:    #1  Esophagus and GEJ- esophageal body appeared normal   The Z-line appeared normal   A small hiatal hernia was noted  #2  Stomach- a few fundal gland polyps were noted  Two ulcerated fundal gland polyps removed with cold snare polypectomy and hot snare polypectomy  The specimens were retrieved with a [employer]  Multiple random biopsies of the antrum were obtained      #3  Duodenum- the bulb and 2nd portion of the duodenum appeared normal   Multiple random biopsies were obtained  IMPRESSIONS:    Ulcerated fundal gland polyp status post snare polypectomy    RECOMMENDATIONS:   PPI   Await pathology  Reflux precautions      COMPLICATIONS:  None; patient tolerated the procedure well    DISPOSITION: PACU  CONDITION: Stable    Karen Teran MD  12/19/2018,12:08 PM

## 2018-12-19 NOTE — ANESTHESIA POSTPROCEDURE EVALUATION
Post-Op Assessment Note      CV Status:  Stable    Mental Status:  Alert and awake    Hydration Status:  Euvolemic    PONV Controlled:  Controlled    Airway Patency:  Patent    Post Op Vitals Reviewed:  Yes              BP   138/99   Temp      Pulse  88   Resp  16   SpO2   96%

## 2018-12-19 NOTE — OP NOTE
COLONOSCOPY    PROCEDURE: Colonoscopy/ Polypectomy (Snare Cautery); resolution clip; submucosal saline and epinephrine injection    INDICATIONS: Anemia, Iron Deficiency    POST-OP DIAGNOSIS: See the impression below    SEDATION: Monitored anesthesia care, check anesthesia records    PHYSICAL EXAM:  Vitals:    12/19/18 1129   BP: 121/75   Pulse: 81   Resp: (!) 24   Temp: 98 3 °F (36 8 °C)   SpO2: 96%     Body mass index is 41 75 kg/m²  General: NAD  Heart: S1 & S2 normal, RRR  Lungs: CTA, No rales or rhonchi  Abdomen: Soft, nontender, nondistended, good bowel sounds    CONSENT:  Informed consent was obtained for the procedure, including sedation after explaining the risks and benefits of the procedure  Risks including but not limited to bleeding, perforation, infection, aspiration were discussed in detail  Also explained about less than 100%$ sensitivity with the exam and other alternatives  PREPARATION:   EKG tracing, pulse oximetry, blood pressure were monitored throughout the procedure  Patient was identified by myself both verbally and by visual inspection of ID band  DESCRIPTION:   Patient was placed in the left lateral decubitus position and was sedated with the above medication  Digital rectal examination was performed  The colonoscope was introduced in to the anal canal and advanced up to cecum, which was identified by the appendiceal orifice and IC valve  A careful inspection was made as the colonoscope was withdrawn, including a retroflexed view of the rectum; findings and interventions are described below  Appropriate photodocumentation was obtained  The quality of the colonic preparation was excellent  The blood loss was minimal     FINDINGS:  A large pedunculated polyp measuring 3 cm was noted in the distal transverse colon and was removed with snare cautery    The polyp site was bleeding and was injected with 3 mL of [id number] epinephrine and 2 resolution clips were deployed and hemostasis was achieved  A 2 cm semi pedunculated polyp was removed with snare cautery polypectomy at the splenic flexure  A large ulcerated fronds like 5 cm polyp was removed from a pedunculated stalk at the splenic flexure with snare cautery polypectomy  Sigmoid diverticulosis was noted the rectum was normal; the cecum was normal          IMPRESSIONS:    Multiple large polyp status post snare cautery polypectomy  Submucosal is a epinephrine injection and resolution clipping of polyp stalk    RECOMMENDATIONS:  Await pathology  Repeat colonoscopy in 1 year  High-fiber diet    COMPLICATIONS:  None; patient tolerated the procedure well    DISPOSITION: PACU  CONDITION: Stable    Earnestine Sherman MD  12/19/2018,12:38 PM

## 2018-12-19 NOTE — DISCHARGE INSTRUCTIONS

## 2019-01-04 DIAGNOSIS — F32.A DEPRESSION, UNSPECIFIED DEPRESSION TYPE: ICD-10-CM

## 2019-01-04 RX ORDER — BUPROPION HYDROCHLORIDE 150 MG/1
TABLET ORAL
Qty: 30 TABLET | Refills: 1 | Status: SHIPPED | OUTPATIENT
Start: 2019-01-04 | End: 2019-02-27 | Stop reason: CLARIF

## 2019-01-17 DIAGNOSIS — E78.49 OTHER HYPERLIPIDEMIA: ICD-10-CM

## 2019-01-17 RX ORDER — ROSUVASTATIN CALCIUM 10 MG/1
TABLET, COATED ORAL
Qty: 30 TABLET | Refills: 4 | Status: SHIPPED | OUTPATIENT
Start: 2019-01-17 | End: 2019-06-16 | Stop reason: SDUPTHER

## 2019-02-09 DIAGNOSIS — F32.A DEPRESSION, UNSPECIFIED DEPRESSION TYPE: ICD-10-CM

## 2019-02-11 RX ORDER — SERTRALINE HYDROCHLORIDE 100 MG/1
TABLET, FILM COATED ORAL
Qty: 30 TABLET | Refills: 0 | Status: SHIPPED | OUTPATIENT
Start: 2019-02-11 | End: 2019-08-07 | Stop reason: SDUPTHER

## 2019-02-27 ENCOUNTER — TELEPHONE (OUTPATIENT)
Dept: INTERNAL MEDICINE CLINIC | Facility: CLINIC | Age: 49
End: 2019-02-27

## 2019-02-27 ENCOUNTER — CONSULT (OUTPATIENT)
Dept: INTERNAL MEDICINE CLINIC | Facility: CLINIC | Age: 49
End: 2019-02-27
Payer: MEDICARE

## 2019-02-27 VITALS
SYSTOLIC BLOOD PRESSURE: 136 MMHG | WEIGHT: 234 LBS | DIASTOLIC BLOOD PRESSURE: 88 MMHG | TEMPERATURE: 98.3 F | OXYGEN SATURATION: 97 % | BODY MASS INDEX: 41.46 KG/M2 | HEART RATE: 86 BPM | HEIGHT: 63 IN

## 2019-02-27 DIAGNOSIS — Z01.818 PREOP EXAMINATION: ICD-10-CM

## 2019-02-27 DIAGNOSIS — R21 RASH: ICD-10-CM

## 2019-02-27 DIAGNOSIS — M21.611 BUNION OF GREAT TOE OF RIGHT FOOT: Primary | ICD-10-CM

## 2019-02-27 DIAGNOSIS — K21.9 GASTROESOPHAGEAL REFLUX DISEASE, ESOPHAGITIS PRESENCE NOT SPECIFIED: ICD-10-CM

## 2019-02-27 DIAGNOSIS — G90.50 RSD (REFLEX SYMPATHETIC DYSTROPHY): ICD-10-CM

## 2019-02-27 DIAGNOSIS — D50.0 IRON DEFICIENCY ANEMIA DUE TO CHRONIC BLOOD LOSS: ICD-10-CM

## 2019-02-27 DIAGNOSIS — I26.99 OTHER PULMONARY EMBOLISM WITHOUT ACUTE COR PULMONALE, UNSPECIFIED CHRONICITY (HCC): ICD-10-CM

## 2019-02-27 DIAGNOSIS — M35.9 AUTOIMMUNE DISEASE (HCC): ICD-10-CM

## 2019-02-27 DIAGNOSIS — Z15.89 HLA B27 (HLA B27 POSITIVE): ICD-10-CM

## 2019-02-27 PROCEDURE — 99214 OFFICE O/P EST MOD 30 MIN: CPT | Performed by: PHYSICIAN ASSISTANT

## 2019-02-27 RX ORDER — DESONIDE 0.5 MG/G
CREAM TOPICAL 2 TIMES DAILY
Qty: 60 G | Refills: 3 | Status: SHIPPED | OUTPATIENT
Start: 2019-02-27

## 2019-02-27 RX ORDER — MULTIVITAMIN WITH IRON
TABLET ORAL DAILY
COMMUNITY
End: 2019-11-07

## 2019-02-27 NOTE — PROGRESS NOTES
Assessment/Plan:  She is medically cleared for the right bunion surgery  However her labs and x-rays done preop are not available to me  She will stop her anticoagulation 2 days before the operation  Diagnoses and all orders for this visit:    Bunion of great toe of right foot    Preop examination    Iron deficiency anemia due to chronic blood loss    Autoimmune disease (HCC)    HLA B27 (HLA B27 positive)    RSD (reflex sympathetic dystrophy)    Other pulmonary embolism without acute cor pulmonale, unspecified chronicity (HCC)    Gastroesophageal reflux disease, esophagitis presence not specified    Rash  -     desonide (DESOWEN) 0 05 % cream; Apply topically 2 (two) times a day    Other orders  -     VITAMIN B COMPLEX-C PO; vitamin B complex   Take 1 tablet by mouth once daily   -     Magnesium 250 MG TABS; Daily        No problem-specific Assessment & Plan notes found for this encounter  Subjective:      Patient ID: Sona Ellis is a 46 y o  female  She is having surgery on her right foot next week  She is having bunionectomy  She has a history of chronic joint complaints that come and go skin rash of her face that comes and goes related to an autoimmune disorder she is followed by Rheumatology  She is on methotrexate and sulfasalazine  History of depression anxiety which are pretty well controlled with medication  Previous PE requiring anticoagulation on Xarelto  She has joint and bowel symptoms she is considering going on a biological in a few months  she has had a fractured ankle with complications followed by 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  Several years ago  Currently no chest pain palpitations dizziness nausea vomiting blood in the stool or shortness of breath  No fever or chills  Has a chronic irritated rash of her eyelids and forehead that flares from time to time        The following portions of the patient's history were reviewed and updated as appropriate:   She has a past medical history of Arthritis, CRPS (complex regional pain syndrome), DVT (deep venous thrombosis) ([facility] ), GERD (gastroesophageal reflux disease), Inflammatory spondylopathies ([facility] ), Nonmelanoma skin cancer, PE (pulmonary thromboembolism) ([facility] ), PONV (postoperative nausea and vomiting), Squamous cell carcinoma, Urinary incontinence, and Uterine leiomyoma  ,  does not have any pertinent problems on file  ,   has a past surgical history that includes Below knee leg amputation; Ankle surgery; Hysterectomy;  section; Sinus surgery; pr lap,rmv  adnexal structure (N/A, 11/10/2017); Other surgical history; Back surgery (2018); pr esophagogastroduodenoscopy transoral diagnostic (N/A, 2018); and pr colonoscopy flx dx w/collj spec when pfrmd (N/A, 2018)  ,  family history includes Breast cancer in her cousin, cousin, and other; Coronary artery disease in her mother; Diabetes in her father; Heart disease in her mother; Hypertension in her mother; Vitiligo in her brother and daughter  ,   reports that she has never smoked  She has never used smokeless tobacco  She reports that she does not drink alcohol or use drugs  ,  is allergic to leflunomide; penicillins; ciprofloxacin; codeine; and morphine     Current Outpatient Medications   Medication Sig Dispense Refill    buPROPion (WELLBUTRIN XL) 300 mg 24 hr tablet Take 1 tablet (300 mg total) by mouth every morning 30 tablet 2    calcium carbonate (OS-APRYL) 600 MG tablet Take 600 mg by mouth 2 (two) times a day with meals      Cholecalciferol (VITAMIN D3) 5000 units CAPS Take by mouth      diazepam (VALIUM) 5 mg tablet TAKE 1 TABLET BY MOUTH EVERY 6 HOURS AS NEEDED FOR MUSCLE SPASM  0    folic acid (FOLVITE) 1 mg tablet Take 3 tablets by mouth daily        glucosamine-chondroitin 500-400 MG tablet Take 1 tablet by mouth 3 (three) times a day      ketoconazole (NIZORAL) 2 % cream Apply topically daily 30 g 2    LORazepam (ATIVAN) 0 5 mg tablet Take 1 tablet (0 5 mg total) by mouth daily at bedtime (Patient taking differently: Take 0 5 mg by mouth as needed ) 30 tablet 0    Magnesium 250 MG TABS Daily      methotrexate 2 5 mg tablet Take 2 5 mg by mouth once a week 6 pills weekly       NUCYNTA 50 MG tablet as needed       omeprazole (PriLOSEC) 20 mg delayed release capsule Take 20 mg by mouth daily      pantoprazole (PROTONIX) 40 mg tablet Take 1 tablet (40 mg total) by mouth daily 30 tablet 2    Probiotic Product (PROBIOTIC-10) CAPS Take by mouth      rivaroxaban (XARELTO) 20 mg tablet Take 20 mg by mouth      rosuvastatin (CRESTOR) 10 MG tablet TAKE 1 TABLET BY MOUTH EVERY DAY 30 tablet 4    sertraline (ZOLOFT) 100 mg tablet TAKE 1/2 TAB DAILY 30 tablet 0    sulfaSALAzine (AZULFIDINE) 500 mg tablet Take 500 mg by mouth 2 (two) times a day 3 tabs bid        VITAMIN B COMPLEX-C PO vitamin B complex   Take 1 tablet by mouth once daily   b complex vitamins capsule Take 1 capsule by mouth daily      desonide (DESOWEN) 0 05 % cream Apply topically 2 (two) times a day 60 g 3     No current facility-administered medications for this visit  Review of Systems   Constitutional: Negative for activity change, appetite change, chills, diaphoresis, fatigue, fever and unexpected weight change  HENT: Negative for congestion, dental problem, drooling, ear discharge, ear pain, facial swelling, hearing loss, nosebleeds, postnasal drip, rhinorrhea, sinus pressure, sinus pain, sneezing, sore throat, tinnitus, trouble swallowing and voice change  Eyes: Negative for photophobia, pain, discharge, redness, itching and visual disturbance  Respiratory: Negative for apnea, cough, choking, chest tightness, shortness of breath, wheezing and stridor  Cardiovascular: Negative for chest pain, palpitations and leg swelling  Gastrointestinal: Positive for nausea   Negative for abdominal distention, abdominal pain, anal bleeding, blood in stool, constipation, diarrhea, rectal pain and vomiting  Endocrine: Negative for cold intolerance, heat intolerance, polydipsia, polyphagia and polyuria  Genitourinary: Negative for decreased urine volume, difficulty urinating, dysuria, enuresis, flank pain, frequency, genital sores, hematuria and urgency  Musculoskeletal: Positive for arthralgias, back pain, gait problem and myalgias  Negative for joint swelling, neck pain and neck stiffness  Skin: Positive for rash  Negative for color change, pallor and wound  Neurological: Negative for dizziness, tremors, seizures, syncope, facial asymmetry, speech difficulty, weakness, light-headedness, numbness and headaches  Hematological: Negative for adenopathy  Does not bruise/bleed easily  Psychiatric/Behavioral: Negative for agitation, behavioral problems, confusion, decreased concentration, dysphoric mood, hallucinations, self-injury, sleep disturbance and suicidal ideas  The patient is not nervous/anxious and is not hyperactive  Objective:  Vitals:    02/27/19 1301   BP: 136/88   BP Location: Left arm   Patient Position: Sitting   Pulse: 86   Temp: 98 3 °F (36 8 °C)   SpO2: 97%   Weight: 106 kg (234 lb)   Height: 5' 3" (1 6 m)     Body mass index is 41 45 kg/m²  Physical Exam   Constitutional: She is oriented to person, place, and time  She appears well-developed  Obese   HENT:   Head: Normocephalic  Right Ear: External ear normal    Left Ear: External ear normal    Mouth/Throat: Oropharynx is clear and moist    Eyes: Pupils are equal, round, and reactive to light  Conjunctivae are normal    Neck: Neck supple  No JVD present  No thyromegaly present  Cardiovascular: Normal rate, regular rhythm, normal heart sounds and intact distal pulses  Exam reveals no gallop and no friction rub  No murmur heard  Pulmonary/Chest: Effort normal and breath sounds normal    Abdominal: Soft  Bowel sounds are normal    Musculoskeletal: Normal range of motion   She exhibits deformity (Below the knee amputation on the left enlarged bunion on the right)  Lymphadenopathy:     She has no cervical adenopathy  Neurological: She is alert and oriented to person, place, and time  She has normal reflexes  She displays normal reflexes  No cranial nerve deficit or sensory deficit  She exhibits normal muscle tone  Coordination normal    Skin: Skin is warm and dry

## 2019-02-27 NOTE — TELEPHONE ENCOUNTER
Pt is coming in today for clearance and the labs were not picked up yesterday they will fax us over the results tomorrow

## 2019-03-12 DIAGNOSIS — F32.A DEPRESSION, UNSPECIFIED DEPRESSION TYPE: ICD-10-CM

## 2019-03-12 RX ORDER — BUPROPION HYDROCHLORIDE 300 MG/1
TABLET ORAL
Qty: 30 TABLET | Refills: 2 | Status: SHIPPED | OUTPATIENT
Start: 2019-03-12 | End: 2019-06-01 | Stop reason: SDUPTHER

## 2019-04-05 ENCOUNTER — TRANSCRIBE ORDERS (OUTPATIENT)
Dept: LAB | Facility: CLINIC | Age: 49
End: 2019-04-05

## 2019-04-05 ENCOUNTER — APPOINTMENT (OUTPATIENT)
Dept: LAB | Facility: CLINIC | Age: 49
End: 2019-04-05
Payer: MEDICARE

## 2019-04-05 DIAGNOSIS — D50.0 IRON DEFICIENCY ANEMIA DUE TO CHRONIC BLOOD LOSS: ICD-10-CM

## 2019-04-05 DIAGNOSIS — Z51.81 ENCOUNTER FOR THERAPEUTIC DRUG MONITORING: ICD-10-CM

## 2019-04-05 DIAGNOSIS — R73.01 IMPAIRED FASTING GLUCOSE: ICD-10-CM

## 2019-04-05 DIAGNOSIS — M25.50 PAIN IN JOINT, MULTIPLE SITES: ICD-10-CM

## 2019-04-05 DIAGNOSIS — M25.50 PAIN IN JOINT, MULTIPLE SITES: Primary | ICD-10-CM

## 2019-04-05 DIAGNOSIS — M46.90 INFLAMMATORY SPONDYLOPATHY, UNSPECIFIED SPINAL REGION (HCC): ICD-10-CM

## 2019-04-05 DIAGNOSIS — E78.5 HYPERLIPIDEMIA, UNSPECIFIED HYPERLIPIDEMIA TYPE: ICD-10-CM

## 2019-04-05 LAB
ALBUMIN SERPL BCP-MCNC: 3.8 G/DL (ref 3.5–5)
ALP SERPL-CCNC: 83 U/L (ref 46–116)
ALT SERPL W P-5'-P-CCNC: 41 U/L (ref 12–78)
ANION GAP SERPL CALCULATED.3IONS-SCNC: 5 MMOL/L (ref 4–13)
AST SERPL W P-5'-P-CCNC: 26 U/L (ref 5–45)
BASOPHILS # BLD AUTO: 0.06 THOUSANDS/ΜL (ref 0–0.1)
BASOPHILS NFR BLD AUTO: 1 % (ref 0–1)
BILIRUB SERPL-MCNC: 0.32 MG/DL (ref 0.2–1)
BUN SERPL-MCNC: 12 MG/DL (ref 5–25)
CALCIUM SERPL-MCNC: 9 MG/DL (ref 8.3–10.1)
CHLORIDE SERPL-SCNC: 106 MMOL/L (ref 100–108)
CHOLEST SERPL-MCNC: 172 MG/DL (ref 50–200)
CO2 SERPL-SCNC: 26 MMOL/L (ref 21–32)
CREAT SERPL-MCNC: 0.81 MG/DL (ref 0.6–1.3)
EOSINOPHIL # BLD AUTO: 0.31 THOUSAND/ΜL (ref 0–0.61)
EOSINOPHIL NFR BLD AUTO: 6 % (ref 0–6)
ERYTHROCYTE [DISTWIDTH] IN BLOOD BY AUTOMATED COUNT: 19.6 % (ref 11.6–15.1)
EST. AVERAGE GLUCOSE BLD GHB EST-MCNC: 123 MG/DL
GFR SERPL CREATININE-BSD FRML MDRD: 85 ML/MIN/1.73SQ M
GLUCOSE SERPL-MCNC: 105 MG/DL (ref 65–140)
HBA1C MFR BLD: 5.9 % (ref 4.2–6.3)
HCT VFR BLD AUTO: 35.7 % (ref 34.8–46.1)
HDLC SERPL-MCNC: 47 MG/DL (ref 40–60)
HGB BLD-MCNC: 10.4 G/DL (ref 11.5–15.4)
IMM GRANULOCYTES # BLD AUTO: 0.01 THOUSAND/UL (ref 0–0.2)
IMM GRANULOCYTES NFR BLD AUTO: 0 % (ref 0–2)
LDLC SERPL CALC-MCNC: 68 MG/DL (ref 0–100)
LYMPHOCYTES # BLD AUTO: 1.43 THOUSANDS/ΜL (ref 0.6–4.47)
LYMPHOCYTES NFR BLD AUTO: 29 % (ref 14–44)
MCH RBC QN AUTO: 22 PG (ref 26.8–34.3)
MCHC RBC AUTO-ENTMCNC: 29.1 G/DL (ref 31.4–37.4)
MCV RBC AUTO: 76 FL (ref 82–98)
MONOCYTES # BLD AUTO: 0.5 THOUSAND/ΜL (ref 0.17–1.22)
MONOCYTES NFR BLD AUTO: 10 % (ref 4–12)
NEUTROPHILS # BLD AUTO: 2.64 THOUSANDS/ΜL (ref 1.85–7.62)
NEUTS SEG NFR BLD AUTO: 54 % (ref 43–75)
NONHDLC SERPL-MCNC: 125 MG/DL
NRBC BLD AUTO-RTO: 0 /100 WBCS
PLATELET # BLD AUTO: 255 THOUSANDS/UL (ref 149–390)
PMV BLD AUTO: 11.5 FL (ref 8.9–12.7)
POTASSIUM SERPL-SCNC: 4.1 MMOL/L (ref 3.5–5.3)
PROT SERPL-MCNC: 7.7 G/DL (ref 6.4–8.2)
RBC # BLD AUTO: 4.73 MILLION/UL (ref 3.81–5.12)
SODIUM SERPL-SCNC: 137 MMOL/L (ref 136–145)
TRIGL SERPL-MCNC: 283 MG/DL
TSH SERPL DL<=0.05 MIU/L-ACNC: 1.72 UIU/ML (ref 0.36–3.74)
WBC # BLD AUTO: 4.95 THOUSAND/UL (ref 4.31–10.16)

## 2019-04-05 PROCEDURE — 83036 HEMOGLOBIN GLYCOSYLATED A1C: CPT

## 2019-04-05 PROCEDURE — 84443 ASSAY THYROID STIM HORMONE: CPT

## 2019-04-05 PROCEDURE — 80053 COMPREHEN METABOLIC PANEL: CPT

## 2019-04-05 PROCEDURE — 36415 COLL VENOUS BLD VENIPUNCTURE: CPT

## 2019-04-05 PROCEDURE — 80061 LIPID PANEL: CPT

## 2019-04-05 PROCEDURE — 85025 COMPLETE CBC W/AUTO DIFF WBC: CPT

## 2019-04-12 ENCOUNTER — OFFICE VISIT (OUTPATIENT)
Dept: INTERNAL MEDICINE CLINIC | Facility: CLINIC | Age: 49
End: 2019-04-12
Payer: MEDICARE

## 2019-04-12 VITALS
RESPIRATION RATE: 16 BRPM | WEIGHT: 230.6 LBS | SYSTOLIC BLOOD PRESSURE: 122 MMHG | HEART RATE: 78 BPM | DIASTOLIC BLOOD PRESSURE: 80 MMHG | HEIGHT: 63 IN | BODY MASS INDEX: 40.86 KG/M2

## 2019-04-12 DIAGNOSIS — R73.01 IMPAIRED FASTING GLUCOSE: ICD-10-CM

## 2019-04-12 DIAGNOSIS — F33.9 EPISODE OF RECURRENT MAJOR DEPRESSIVE DISORDER, UNSPECIFIED DEPRESSION EPISODE SEVERITY (HCC): ICD-10-CM

## 2019-04-12 DIAGNOSIS — M46.97 INFLAMMATORY SPONDYLOPATHY OF LUMBOSACRAL REGION (HCC): ICD-10-CM

## 2019-04-12 DIAGNOSIS — D50.9 IRON DEFICIENCY ANEMIA, UNSPECIFIED IRON DEFICIENCY ANEMIA TYPE: ICD-10-CM

## 2019-04-12 DIAGNOSIS — Z78.0 POST-MENOPAUSAL: ICD-10-CM

## 2019-04-12 DIAGNOSIS — G90.50 RSD (REFLEX SYMPATHETIC DYSTROPHY): ICD-10-CM

## 2019-04-12 DIAGNOSIS — K21.9 GASTROESOPHAGEAL REFLUX DISEASE WITHOUT ESOPHAGITIS: Primary | ICD-10-CM

## 2019-04-12 DIAGNOSIS — I26.99 OTHER PULMONARY EMBOLISM WITHOUT ACUTE COR PULMONALE, UNSPECIFIED CHRONICITY (HCC): ICD-10-CM

## 2019-04-12 DIAGNOSIS — Z23 ENCOUNTER FOR IMMUNIZATION: ICD-10-CM

## 2019-04-12 DIAGNOSIS — Z00.00 HEALTH MAINTENANCE EXAMINATION: ICD-10-CM

## 2019-04-12 DIAGNOSIS — E78.5 HYPERLIPIDEMIA, UNSPECIFIED HYPERLIPIDEMIA TYPE: ICD-10-CM

## 2019-04-12 PROCEDURE — 90732 PPSV23 VACC 2 YRS+ SUBQ/IM: CPT | Performed by: NURSE PRACTITIONER

## 2019-04-12 PROCEDURE — G0009 ADMIN PNEUMOCOCCAL VACCINE: HCPCS | Performed by: NURSE PRACTITIONER

## 2019-04-12 PROCEDURE — 99214 OFFICE O/P EST MOD 30 MIN: CPT | Performed by: NURSE PRACTITIONER

## 2019-04-16 ENCOUNTER — HOSPITAL ENCOUNTER (OUTPATIENT)
Dept: MAMMOGRAPHY | Facility: CLINIC | Age: 49
Discharge: HOME | End: 2019-04-16
Payer: MEDICARE

## 2019-04-16 VITALS — WEIGHT: 232 LBS | BODY MASS INDEX: 41.11 KG/M2 | HEIGHT: 63 IN

## 2019-04-16 DIAGNOSIS — Z12.39 SCREENING FOR BREAST CANCER: ICD-10-CM

## 2019-04-16 DIAGNOSIS — Z78.0 POST-MENOPAUSAL: ICD-10-CM

## 2019-04-16 DIAGNOSIS — M46.97 INFLAMMATORY SPONDYLOPATHY OF LUMBOSACRAL REGION (HCC): ICD-10-CM

## 2019-04-16 PROCEDURE — 77080 DXA BONE DENSITY AXIAL: CPT

## 2019-04-16 PROCEDURE — 77063 BREAST TOMOSYNTHESIS BI: CPT

## 2019-04-16 PROCEDURE — 77067 SCR MAMMO BI INCL CAD: CPT

## 2019-04-17 ENCOUNTER — TELEPHONE (OUTPATIENT)
Dept: INTERNAL MEDICINE CLINIC | Facility: CLINIC | Age: 49
End: 2019-04-17

## 2019-06-01 DIAGNOSIS — F32.A DEPRESSION, UNSPECIFIED DEPRESSION TYPE: ICD-10-CM

## 2019-06-03 RX ORDER — BUPROPION HYDROCHLORIDE 300 MG/1
TABLET ORAL
Qty: 30 TABLET | Refills: 2 | Status: SHIPPED | OUTPATIENT
Start: 2019-06-03 | End: 2019-06-26 | Stop reason: SDUPTHER

## 2019-06-14 DIAGNOSIS — K21.9 GASTROESOPHAGEAL REFLUX DISEASE WITHOUT ESOPHAGITIS: Primary | ICD-10-CM

## 2019-06-14 RX ORDER — OMEPRAZOLE 20 MG/1
20 CAPSULE, DELAYED RELEASE ORAL DAILY
Qty: 30 CAPSULE | Refills: 0 | Status: SHIPPED | OUTPATIENT
Start: 2019-06-14 | End: 2019-07-10 | Stop reason: SDUPTHER

## 2019-06-16 DIAGNOSIS — E78.49 OTHER HYPERLIPIDEMIA: ICD-10-CM

## 2019-06-17 RX ORDER — ROSUVASTATIN CALCIUM 10 MG/1
TABLET, COATED ORAL
Qty: 30 TABLET | Refills: 4 | Status: SHIPPED | OUTPATIENT
Start: 2019-06-17 | End: 2019-08-10 | Stop reason: SDUPTHER

## 2019-06-26 DIAGNOSIS — F32.A DEPRESSION, UNSPECIFIED DEPRESSION TYPE: ICD-10-CM

## 2019-06-26 RX ORDER — BUPROPION HYDROCHLORIDE 300 MG/1
TABLET ORAL
Qty: 30 TABLET | Refills: 2 | Status: SHIPPED | OUTPATIENT
Start: 2019-06-26 | End: 2019-09-25 | Stop reason: SDUPTHER

## 2019-06-30 DIAGNOSIS — O22.30 DVT (DEEP VEIN THROMBOSIS) IN PREGNANCY: ICD-10-CM

## 2019-07-01 RX ORDER — RIVAROXABAN 20 MG/1
TABLET, FILM COATED ORAL
Qty: 90 TABLET | Refills: 3 | Status: SHIPPED | OUTPATIENT
Start: 2019-07-01 | End: 2019-11-07

## 2019-07-10 DIAGNOSIS — K21.9 GASTROESOPHAGEAL REFLUX DISEASE WITHOUT ESOPHAGITIS: ICD-10-CM

## 2019-07-11 RX ORDER — OMEPRAZOLE 20 MG/1
CAPSULE, DELAYED RELEASE ORAL
Qty: 30 CAPSULE | Refills: 0 | Status: SHIPPED | OUTPATIENT
Start: 2019-07-11 | End: 2019-07-22 | Stop reason: SDUPTHER

## 2019-07-22 DIAGNOSIS — K21.9 GASTROESOPHAGEAL REFLUX DISEASE WITHOUT ESOPHAGITIS: ICD-10-CM

## 2019-07-22 RX ORDER — OMEPRAZOLE 20 MG/1
20 CAPSULE, DELAYED RELEASE ORAL DAILY
Qty: 90 CAPSULE | Refills: 2 | Status: SHIPPED | OUTPATIENT
Start: 2019-07-22 | End: 2019-11-07 | Stop reason: SDUPTHER

## 2019-08-07 DIAGNOSIS — F32.A DEPRESSION, UNSPECIFIED DEPRESSION TYPE: ICD-10-CM

## 2019-08-08 RX ORDER — SERTRALINE HYDROCHLORIDE 100 MG/1
TABLET, FILM COATED ORAL
Qty: 30 TABLET | Refills: 0 | Status: SHIPPED | OUTPATIENT
Start: 2019-08-08 | End: 2019-10-03 | Stop reason: SDUPTHER

## 2019-08-10 DIAGNOSIS — E78.49 OTHER HYPERLIPIDEMIA: ICD-10-CM

## 2019-08-12 RX ORDER — ROSUVASTATIN CALCIUM 10 MG/1
TABLET, COATED ORAL
Qty: 90 TABLET | Refills: 1 | Status: SHIPPED | OUTPATIENT
Start: 2019-08-12 | End: 2019-12-06 | Stop reason: SDUPTHER

## 2019-08-13 ENCOUNTER — TELEPHONE (OUTPATIENT)
Dept: INTERNAL MEDICINE CLINIC | Facility: CLINIC | Age: 49
End: 2019-08-13

## 2019-08-13 NOTE — TELEPHONE ENCOUNTER
Pt's daughter is expecting a baby, her and her  Parish Núñez need a TDAP  Can we schedule on nurses schedule or do they need an appt?  Last seen 4/12/19    Also needs order in chart     [phone]

## 2019-09-25 DIAGNOSIS — F32.A DEPRESSION, UNSPECIFIED DEPRESSION TYPE: ICD-10-CM

## 2019-09-26 RX ORDER — BUPROPION HYDROCHLORIDE 300 MG/1
TABLET ORAL
Qty: 90 TABLET | Refills: 0 | Status: SHIPPED | OUTPATIENT
Start: 2019-09-26 | End: 2019-12-06 | Stop reason: SDUPTHER

## 2019-10-03 DIAGNOSIS — F32.A DEPRESSION, UNSPECIFIED DEPRESSION TYPE: ICD-10-CM

## 2019-10-03 RX ORDER — SERTRALINE HYDROCHLORIDE 100 MG/1
TABLET, FILM COATED ORAL
Qty: 30 TABLET | Refills: 0 | Status: SHIPPED | OUTPATIENT
Start: 2019-10-03 | End: 2019-12-02 | Stop reason: SDUPTHER

## 2019-11-05 ENCOUNTER — TELEPHONE (OUTPATIENT)
Dept: INTERNAL MEDICINE CLINIC | Facility: CLINIC | Age: 49
End: 2019-11-05

## 2019-11-06 ENCOUNTER — APPOINTMENT (OUTPATIENT)
Dept: LAB | Facility: CLINIC | Age: 49
End: 2019-11-06
Payer: MEDICARE

## 2019-11-06 DIAGNOSIS — E78.5 HYPERLIPIDEMIA, UNSPECIFIED HYPERLIPIDEMIA TYPE: ICD-10-CM

## 2019-11-06 DIAGNOSIS — R73.01 IMPAIRED FASTING GLUCOSE: ICD-10-CM

## 2019-11-06 DIAGNOSIS — D50.9 IRON DEFICIENCY ANEMIA, UNSPECIFIED IRON DEFICIENCY ANEMIA TYPE: ICD-10-CM

## 2019-11-06 LAB
ALBUMIN SERPL BCP-MCNC: 4 G/DL (ref 3.5–5)
ALP SERPL-CCNC: 66 U/L (ref 46–116)
ALT SERPL W P-5'-P-CCNC: 29 U/L (ref 12–78)
ANION GAP SERPL CALCULATED.3IONS-SCNC: 5 MMOL/L (ref 4–13)
AST SERPL W P-5'-P-CCNC: 19 U/L (ref 5–45)
BASOPHILS # BLD AUTO: 0.07 THOUSANDS/ΜL (ref 0–0.1)
BASOPHILS NFR BLD AUTO: 2 % (ref 0–1)
BILIRUB SERPL-MCNC: 0.17 MG/DL (ref 0.2–1)
BUN SERPL-MCNC: 15 MG/DL (ref 5–25)
CALCIUM SERPL-MCNC: 9.3 MG/DL (ref 8.3–10.1)
CHLORIDE SERPL-SCNC: 110 MMOL/L (ref 100–108)
CHOLEST SERPL-MCNC: 183 MG/DL (ref 50–200)
CO2 SERPL-SCNC: 26 MMOL/L (ref 21–32)
CREAT SERPL-MCNC: 0.77 MG/DL (ref 0.6–1.3)
EOSINOPHIL # BLD AUTO: 0.32 THOUSAND/ΜL (ref 0–0.61)
EOSINOPHIL NFR BLD AUTO: 7 % (ref 0–6)
ERYTHROCYTE [DISTWIDTH] IN BLOOD BY AUTOMATED COUNT: 20 % (ref 11.6–15.1)
EST. AVERAGE GLUCOSE BLD GHB EST-MCNC: 120 MG/DL
FERRITIN SERPL-MCNC: 4 NG/ML (ref 8–388)
GFR SERPL CREATININE-BSD FRML MDRD: 90 ML/MIN/1.73SQ M
GLUCOSE P FAST SERPL-MCNC: 109 MG/DL (ref 65–99)
HBA1C MFR BLD: 5.8 % (ref 4.2–6.3)
HCT VFR BLD AUTO: 34.8 % (ref 34.8–46.1)
HDLC SERPL-MCNC: 59 MG/DL
HGB BLD-MCNC: 10.2 G/DL (ref 11.5–15.4)
IMM GRANULOCYTES # BLD AUTO: 0.01 THOUSAND/UL (ref 0–0.2)
IMM GRANULOCYTES NFR BLD AUTO: 0 % (ref 0–2)
IRON SATN MFR SERPL: 8 %
IRON SERPL-MCNC: 27 UG/DL (ref 50–170)
LDLC SERPL CALC-MCNC: 88 MG/DL (ref 0–100)
LYMPHOCYTES # BLD AUTO: 1.82 THOUSANDS/ΜL (ref 0.6–4.47)
LYMPHOCYTES NFR BLD AUTO: 41 % (ref 14–44)
MCH RBC QN AUTO: 22.4 PG (ref 26.8–34.3)
MCHC RBC AUTO-ENTMCNC: 29.3 G/DL (ref 31.4–37.4)
MCV RBC AUTO: 76 FL (ref 82–98)
MONOCYTES # BLD AUTO: 0.39 THOUSAND/ΜL (ref 0.17–1.22)
MONOCYTES NFR BLD AUTO: 9 % (ref 4–12)
NEUTROPHILS # BLD AUTO: 1.78 THOUSANDS/ΜL (ref 1.85–7.62)
NEUTS SEG NFR BLD AUTO: 41 % (ref 43–75)
NONHDLC SERPL-MCNC: 124 MG/DL
NRBC BLD AUTO-RTO: 0 /100 WBCS
PLATELET # BLD AUTO: 217 THOUSANDS/UL (ref 149–390)
PMV BLD AUTO: 11.6 FL (ref 8.9–12.7)
POTASSIUM SERPL-SCNC: 4 MMOL/L (ref 3.5–5.3)
PROT SERPL-MCNC: 7.5 G/DL (ref 6.4–8.2)
RBC # BLD AUTO: 4.56 MILLION/UL (ref 3.81–5.12)
SODIUM SERPL-SCNC: 141 MMOL/L (ref 136–145)
TIBC SERPL-MCNC: 339 UG/DL (ref 250–450)
TRIGL SERPL-MCNC: 180 MG/DL
TSH SERPL DL<=0.05 MIU/L-ACNC: 2.28 UIU/ML (ref 0.36–3.74)
WBC # BLD AUTO: 4.39 THOUSAND/UL (ref 4.31–10.16)

## 2019-11-06 PROCEDURE — 83540 ASSAY OF IRON: CPT

## 2019-11-06 PROCEDURE — 36415 COLL VENOUS BLD VENIPUNCTURE: CPT

## 2019-11-06 PROCEDURE — 82728 ASSAY OF FERRITIN: CPT

## 2019-11-06 PROCEDURE — 85025 COMPLETE CBC W/AUTO DIFF WBC: CPT

## 2019-11-06 PROCEDURE — 80053 COMPREHEN METABOLIC PANEL: CPT

## 2019-11-06 PROCEDURE — 83036 HEMOGLOBIN GLYCOSYLATED A1C: CPT

## 2019-11-06 PROCEDURE — 80061 LIPID PANEL: CPT

## 2019-11-06 PROCEDURE — 83550 IRON BINDING TEST: CPT

## 2019-11-06 PROCEDURE — 84443 ASSAY THYROID STIM HORMONE: CPT

## 2019-11-07 ENCOUNTER — OFFICE VISIT (OUTPATIENT)
Dept: INTERNAL MEDICINE CLINIC | Facility: CLINIC | Age: 49
End: 2019-11-07
Payer: MEDICARE

## 2019-11-07 VITALS
HEIGHT: 63 IN | DIASTOLIC BLOOD PRESSURE: 86 MMHG | HEART RATE: 80 BPM | WEIGHT: 225.6 LBS | BODY MASS INDEX: 39.97 KG/M2 | RESPIRATION RATE: 16 BRPM | SYSTOLIC BLOOD PRESSURE: 130 MMHG

## 2019-11-07 DIAGNOSIS — E11.9 TYPE 2 DIABETES MELLITUS WITHOUT COMPLICATION, WITHOUT LONG-TERM CURRENT USE OF INSULIN (HCC): ICD-10-CM

## 2019-11-07 DIAGNOSIS — D36.9 TUBULAR ADENOMA: ICD-10-CM

## 2019-11-07 DIAGNOSIS — K21.9 GASTROESOPHAGEAL REFLUX DISEASE WITHOUT ESOPHAGITIS: Primary | ICD-10-CM

## 2019-11-07 DIAGNOSIS — G90.50 RSD (REFLEX SYMPATHETIC DYSTROPHY): ICD-10-CM

## 2019-11-07 DIAGNOSIS — I26.99 OTHER PULMONARY EMBOLISM WITHOUT ACUTE COR PULMONALE, UNSPECIFIED CHRONICITY (HCC): ICD-10-CM

## 2019-11-07 DIAGNOSIS — E78.5 HYPERLIPIDEMIA, UNSPECIFIED HYPERLIPIDEMIA TYPE: ICD-10-CM

## 2019-11-07 DIAGNOSIS — F33.9 EPISODE OF RECURRENT MAJOR DEPRESSIVE DISORDER, UNSPECIFIED DEPRESSION EPISODE SEVERITY (HCC): ICD-10-CM

## 2019-11-07 DIAGNOSIS — R73.01 IMPAIRED FASTING GLUCOSE: ICD-10-CM

## 2019-11-07 DIAGNOSIS — M46.97 INFLAMMATORY SPONDYLOPATHY OF LUMBOSACRAL REGION (HCC): ICD-10-CM

## 2019-11-07 DIAGNOSIS — D50.0 IRON DEFICIENCY ANEMIA DUE TO CHRONIC BLOOD LOSS: ICD-10-CM

## 2019-11-07 PROCEDURE — G0438 PPPS, INITIAL VISIT: HCPCS | Performed by: INTERNAL MEDICINE

## 2019-11-07 PROCEDURE — 99214 OFFICE O/P EST MOD 30 MIN: CPT | Performed by: INTERNAL MEDICINE

## 2019-11-07 RX ORDER — FERROUS SULFATE TAB EC 324 MG (65 MG FE EQUIVALENT) 324 (65 FE) MG
324 TABLET DELAYED RESPONSE ORAL EVERY OTHER DAY
Start: 2019-11-07

## 2019-11-07 NOTE — PROGRESS NOTES
Assessment and Plan:     Problem List Items Addressed This Visit     None           Preventive health issues were discussed with patient, and age appropriate screening tests were ordered as noted in patient's After Visit Summary  Personalized health advice and appropriate referrals for health education or preventive services given if needed, as noted in patient's After Visit Summary       History of Present Illness:     Patient presents for Medicare Annual Wellness visit    Patient Care Team:  Richard Mcdonald MD as PCP - General (Internal Medicine)  MD Megan Epps MD (Gastroenterology)  Megan Valentine MD as Endoscopist     Problem List:     Patient Active Problem List   Diagnosis    Inflammatory spondylopathy of lumbosacral region [facility])    Impaired fasting glucose    Hyperlipidemia    HLA B27 (HLA B27 positive)    Esophageal reflux    Elevated liver enzymes    Depression    Autoimmune disease ([facility] [address] )    Pulmonary embolism (HCC)    RSD (reflex sympathetic dystrophy)    Unknown skin lesion    Excoriation (skin-picking) disorder    Iron deficiency anemia due to chronic blood loss    Iron deficiency anemia    Gastroesophageal reflux disease without esophagitis    Gastroesophageal reflux disease    Health maintenance examination      Past Medical and Surgical History:     Past Medical History:   Diagnosis Date    Arthritis     CRPS (complex regional pain syndrome)     DVT (deep venous thrombosis) (HCC)     GERD (gastroesophageal reflux disease)     [facility]     Nonmelanoma skin cancer     LAST ASSESSED: [id number]    PE (pulmonary thromboembolism) (HCC)     PONV (postoperative nausea and vomiting)     Squamous cell carcinoma     Urinary incontinence     Uterine leiomyoma     LAST ASSESSED: [id number]     Past Surgical History:   Procedure Laterality Date    ANKLE SURGERY      BACK SURGERY  03/05/2018    BELOW KNEE LEG AMPUTATION LAST ASSESSED: [id number]    BUNIONECTOMY Right      SECTION      HYSTERECTOMY      LAST ASSESSED: [id number]    OTHER SURGICAL HISTORY      SPINAL STEROTAXIS STIMULATION OF CORD; LAST ASSESSED: [id number]    MT COLONOSCOPY FLX DX W/COLLJ SPEC WHEN PFRMD N/A 2018    Procedure: COLONOSCOPY;  Surgeon: Christie Saxena MD;  [facility]: [facility]; Service: Gastroenterology    MT ESOPHAGOGASTRODUODENOSCOPY TRANSORAL DIAGNOSTIC N/A 2018    Procedure: ESOPHAGOGASTRODUODENOSCOPY (EGD); Surgeon: Christie Saxena MD;  [facility];   Service: Gastroenterology    MT LAP,RMV  ADNEXAL STRUCTURE N/A 11/10/2017    Procedure: LAPAROSCOPIC REMOVAL OF BILATERAL OVARIES;  LYSIS OF ADHESIONS;  Surgeon: Wilber Robledo MD;  [facility]: [facility];  Service: Gynecology    SINUS SURGERY        Family History:     Family History   Problem Relation Age of Onset    Hypertension Mother     Heart disease Mother     Coronary artery disease Mother         Due to calcified coronary lesion     Diabetes Father         Type 2, controlled with neuropathy     Vitiligo Brother     Breast cancer Cousin 28    Breast cancer Other     Vitiligo Daughter       Social History:     Social History     Socioeconomic History    Marital status: /Civil Union     Spouse name: None    Number of children: None    Years of education: None    Highest education level: None   Occupational History    Occupation: Retired   Social Needs    Financial resource strain: None    Food insecurity:     Worry: None     Inability: None    Transportation needs:     Medical: None     Non-medical: None   Tobacco Use    Smoking status: Never Smoker    Smokeless tobacco: Never Used   Substance and Sexual Activity    Alcohol use: No    Drug use: No    Sexual activity: Yes     Partners: Male   Lifestyle    Physical activity:     Days per week: 0 days     Minutes per session: 0 min    Stress: Rather much Relationships    Social connections:     Talks on phone: None     Gets together: None     Attends [employer] service: None     Active member of club or organization: None     Attends meetings of clubs or organizations: None     Relationship status: None    Intimate partner violence:     Fear of current or ex partner: None     Emotionally abused: None     Physically abused: None     Forced sexual activity: None   Other Topics Concern    None   Social History Narrative    None       Medications and Allergies:     Current Outpatient Medications   Medication Sig Dispense Refill    Adalimumab (HUMIRA PEN) 40 MG/0 4ML PNKT Humira(CF) Pen 40 mg/0 4 mL subcutaneous kit   injuct twice a month      b complex vitamins capsule Take 1 capsule by mouth daily      buPROPion (WELLBUTRIN XL) 300 mg 24 hr tablet TAKE 1 TABLET BY MOUTH EVERY DAY IN THE MORNING 90 tablet 0    calcium carbonate (OS-APRYL) 600 MG tablet Take 600 mg by mouth 2 (two) times a day with meals      Cholecalciferol (VITAMIN D3) 5000 units CAPS Take by mouth      desonide (DESOWEN) 0 05 % cream Apply topically 2 (two) times a day (Patient taking differently: Apply topically as needed ) 60 g 3    folic acid (FOLVITE) 1 mg tablet Take 3 tablets by mouth daily        glucosamine-chondroitin 500-400 MG tablet Take 1 tablet by mouth 3 (three) times a day      ketoconazole (NIZORAL) 2 % cream Apply topically daily (Patient taking differently: Apply topically as needed ) 30 g 2    LORazepam (ATIVAN) 0 5 mg tablet Take 1 tablet (0 5 mg total) by mouth daily at bedtime (Patient taking differently: Take 0 5 mg by mouth as needed ) 30 tablet 0    methotrexate 2 5 mg tablet Take 2 5 mg by mouth once a week 6 pills weekly       NUCYNTA 50 MG tablet as needed       omeprazole (PriLOSEC) 20 mg delayed release capsule Take 20 mg by mouth daily      Probiotic Product (PROBIOTIC-10) CAPS Take by mouth      rivaroxaban (XARELTO) 20 mg tablet Take 20 mg by mouth  rosuvastatin (CRESTOR) 10 MG tablet TAKE 1 TABLET BY MOUTH EVERY DAY 90 tablet 1    sertraline (ZOLOFT) 100 mg tablet TAKE 1/2 TAB DAILY 30 tablet 0    sulfaSALAzine (AZULFIDINE) 500 mg tablet Take 500 mg by mouth 2 (two) times a day 3 tabs bid        VITAMIN B COMPLEX-C PO vitamin B complex   Take 1 tablet by mouth once daily   Magnesium 250 MG TABS Daily      pantoprazole (PROTONIX) 40 mg tablet Take 1 tablet (40 mg total) by mouth daily (Patient not taking: Reported on 11/7/2019) 30 tablet 2    XARELTO 20 MG tablet TAKE ONE TABLET BY MOUTH EVERY DAY (Patient not taking: Reported on 11/7/2019) 90 tablet 3     No current facility-administered medications for this visit  Allergies   Allergen Reactions    Leflunomide Shortness Of Breath and Palpitations    Penicillins Hives    Ciprofloxacin Rash    Codeine Itching and Rash    Morphine Itching and Rash      Immunizations:     Immunization History   Administered Date(s) Administered    INFLUENZA 11/07/2015, 11/16/2016, 10/05/2017, 10/25/2018    Influenza Quadrivalent, 6-35 Months IM 11/16/2016    Influenza TIV (IM) 10/05/2017    Pneumococcal Polysaccharide PPV23 04/12/2019    Tdap 10/30/2017      Health Maintenance:         Topic Date Due    MAMMOGRAM  04/16/2020         Topic Date Due    INFLUENZA VACCINE  07/01/2019      Medicare Health Risk Assessment:     /86 (BP Location: Left arm, Patient Position: Sitting, Cuff Size: Standard)   Pulse 80   Resp 16   Ht 5' 3" (1 6 m)   Wt 102 kg (225 lb 9 6 oz)   BMI 39 96 kg/m²      Parag Ward is here for her Initial Wellness visit  Health Risk Assessment:   Patient rates overall health as good  Patient feels that their physical health rating is slightly worse  Eyesight was rated as slightly worse  Hearing was rated as same  Patient feels that their emotional and mental health rating is same  Pain experienced in the last 7 days has been a lot  Patient's pain rating has been 8/10   Patient states that she has experienced no weight loss or gain in last 6 months  Depression Screening:   PHQ-2 Score: 1  PHQ-9 Score: 1      Fall Risk Screening: In the past year, patient has experienced: history of falling in past year    Number of falls: 1  Injured during fall?: Yes    Feels unsteady when standing or walking?: Yes    Worried about falling?: Yes      Urinary Incontinence Screening:   Patient has leaked urine accidently in the last six months  Home Safety:  Patient has trouble with stairs inside or outside of their home  Patient has working smoke alarms and has no working carbon monoxide detector  Home safety hazards include: none  Nutrition:   Current diet is Regular and Limited junk food  Medications:   Patient is currently taking over-the-counter supplements  OTC medications include: see medication list  Patient is able to manage medications  Activities of Daily Living (ADLs)/Instrumental Activities of Daily Living (IADLs):   Walk and transfer into and out of bed and chair?: Yes  Dress and groom yourself?: Yes    Bathe or shower yourself?: Yes    Feed yourself?  Yes  Do your laundry/housekeeping?: Yes  Manage your money, pay your bills and track your expenses?: Yes  Make your own meals?: Yes    Do your own shopping?: Yes    Previous Hospitalizations:   Any hospitalizations or ED visits within the last 12 months?: No      Advance Care Planning:   Living will: Yes    Advanced directive: Yes    Five wishes given: Yes      Cognitive Screening:   Provider or family/friend/caregiver concerned regarding cognition?: No    PREVENTIVE SCREENINGS      Cardiovascular Screening:    General: Screening Not Indicated and History Lipid Disorder      Diabetes Screening:     General: Screening Current      Colorectal Cancer Screening:     General: Screening Current      Breast Cancer Screening:     General: Screening Current      Cervical Cancer Screening:    General: Screening Not Indicated Osteoporosis Screening:    General: Screening Current      Abdominal Aortic Aneurysm (AAA) Screening:        General: Screening Not Indicated      Lung Cancer Screening:     General: Screening Not Indicated      Hepatitis C Screening:    General: Screening Not Indicated      Suzanne Tilley MD

## 2019-11-07 NOTE — PROGRESS NOTES
Assessment/Plan:    Diagnoses and all orders for this visit:    Gastroesophageal reflux disease without esophagitis    Impaired fasting glucose  -     Hemoglobin A1C; Future    Other pulmonary embolism without acute cor pulmonale, unspecified chronicity (HCC)    RSD (reflex sympathetic dystrophy)    Inflammatory spondylopathy of lumbosacral region ([facility] [address] )    Hyperlipidemia, unspecified hyperlipidemia type  -     Comprehensive metabolic panel; Future  -     Lipid panel; Future  -     TSH, 3rd generation with Free T4 reflex; Future    Episode of recurrent major depressive disorder, unspecified depression episode severity ([facility])    Tubular adenoma    Iron deficiency anemia due to chronic blood loss  -     ferrous sulfate 324 (65 Fe) mg; Take 1 tablet (324 mg total) by mouth every other day  -     Ambulatory referral to Gastroenterology; Future  -     CBC and differential; Future  -     Ferritin; Future  -     Iron Panel (Includes Ferritin, Iron Sat%, Iron, and TIBC); Future  -     IRIS Diabetic eye exam  -     Occult Blood, Fecal Immunochemical; Future    Type 2 diabetes mellitus without complication, without long-term current use of insulin ([facility])    Other orders  -     Adalimumab (HUMIRA PEN) 40 MG/0 4ML PNKT; Humira(CF) Pen 40 mg/0 4 mL subcutaneous kit   injuct twice a month         Patient Instructions   Lab data reviewed in detail and compared prior    Type 2 diabetes mellitus-A1c was 6 6 in 2016, foot exam unremarkable, check iris exam today, A1c has been stable, now 5 8, continue regular exercise, low carb diet and weight loss efforts  Hyperlipidemia-LDL at goal on Crestor, continue with same    History of recurrent venous thromboembolism-continue on indefinite anticoagulation, will review literature regarding her clinical situation and consideration for decreasing dose to 10 mg      Inflammatory spondyloarthropathy following with Rheumatology    GERD stable on omeprazole    Depression stable on sertraline and Wellbutrin    Iron deficiency anemia with history of tubular adenoma 1 year ago-check fecal occult blood, start oral iron, refer to GI for repeat endoscopies    Routine follow-up after labs in 6 months, sooner as needed  Subjective:      Patient ID: Ama Orourke is a 44 y o  female    F/u mmp, well visit, review labs  Feeling well  Very active but no formal exercise  Taking all medication as prescribed  HPL- tolerating Crestor  LLE DVT and PE 2009, recurrent LLE DVT '14 when immobilized post op  Stable on Xarelto  Dgtr also w/ h/o DVT/PE  Inflammatory sponyloarthropathy-Following with Rheumatologym, Dr Jagruti Martinez, started humira w/ mtx and sulfasalazine and stable  Had colonoscopy back in December '18 had large TA will need colonoscopy next Month    Status post hysterectomy total        Current Outpatient Medications:     Adalimumab (HUMIRA PEN) 40 MG/0 4ML PNKT, Humira(CF) Pen 40 mg/0 4 mL subcutaneous kit  injuct twice a month, Disp: , Rfl:     b complex vitamins capsule, Take 1 capsule by mouth daily, Disp: , Rfl:     buPROPion (WELLBUTRIN XL) 300 mg 24 hr tablet, TAKE 1 TABLET BY MOUTH EVERY DAY IN THE MORNING, Disp: 90 tablet, Rfl: 0    calcium carbonate (OS-APRYL) 600 MG tablet, Take 600 mg by mouth 2 (two) times a day with meals, Disp: , Rfl:     Cholecalciferol (VITAMIN D3) 5000 units CAPS, Take by mouth, Disp: , Rfl:     desonide (DESOWEN) 0 05 % cream, Apply topically 2 (two) times a day (Patient taking differently: Apply topically as needed ), Disp: 60 g, Rfl: 3    folic acid (FOLVITE) 1 mg tablet, Take 3 tablets by mouth daily  , Disp: , Rfl:     glucosamine-chondroitin 500-400 MG tablet, Take 1 tablet by mouth 3 (three) times a day, Disp: , Rfl:     ketoconazole (NIZORAL) 2 % cream, Apply topically daily (Patient taking differently: Apply topically as needed ), Disp: 30 g, Rfl: 2    LORazepam (ATIVAN) 0 5 mg tablet, Take 1 tablet (0 5 mg total) by mouth daily at bedtime (Patient taking differently: Take 0 5 mg by mouth as needed ), Disp: 30 tablet, Rfl: 0    methotrexate 2 5 mg tablet, Take 2 5 mg by mouth once a week 6 pills weekly , Disp: , Rfl:     omeprazole (PriLOSEC) 20 mg delayed release capsule, Take 20 mg by mouth daily, Disp: , Rfl:     Probiotic Product (PROBIOTIC-10) CAPS, Take by mouth, Disp: , Rfl:     rivaroxaban (XARELTO) 20 mg tablet, Take 20 mg by mouth, Disp: , Rfl:     rosuvastatin (CRESTOR) 10 MG tablet, TAKE 1 TABLET BY MOUTH EVERY DAY, Disp: 90 tablet, Rfl: 1    sertraline (ZOLOFT) 100 mg tablet, TAKE 1/2 TAB DAILY, Disp: 30 tablet, Rfl: 0    sulfaSALAzine (AZULFIDINE) 500 mg tablet, Take 500 mg by mouth 2 (two) times a day 3 tabs bid  , Disp: , Rfl:     VITAMIN B COMPLEX-C PO, vitamin B complex  Take 1 tablet by mouth once daily  , Disp: , Rfl:     ferrous sulfate 324 (65 Fe) mg, Take 1 tablet (324 mg total) by mouth every other day, Disp: , Rfl:     Recent Results (from the past 1008 hour(s))   CBC and differential    Collection Time: 11/06/19 12:36 PM   Result Value Ref Range    WBC 4 39 4 31 - 10 16 Thousand/uL    RBC 4 56 3 81 - 5 12 Million/uL    Hemoglobin 10 2 (L) 11 5 - 15 4 g/dL    Hematocrit 34 8 34 8 - 46 1 %    MCV 76 (L) 82 - 98 fL    MCH 22 4 (L) 26 8 - 34 3 pg    MCHC 29 3 (L) 31 4 - 37 4 g/dL    RDW 20 0 (H) 11 6 - 15 1 %    MPV 11 6 8 9 - 12 7 fL    Platelets 257 448 - 365 Thousands/uL    nRBC 0 /100 WBCs    Neutrophils Relative 41 (L) 43 - 75 %    Immat GRANS % 0 0 - 2 %    Lymphocytes Relative 41 14 - 44 %    Monocytes Relative 9 4 - 12 %    Eosinophils Relative 7 (H) 0 - 6 %    Basophils Relative 2 (H) 0 - 1 %    Neutrophils Absolute 1 78 (L) 1 85 - 7 62 Thousands/µL    Immature Grans Absolute 0 01 0 00 - 0 20 Thousand/uL    Lymphocytes Absolute 1 82 0 60 - 4 47 Thousands/µL    Monocytes Absolute 0 39 0 17 - 1 22 Thousand/µL    Eosinophils Absolute 0 32 0 00 - 0 61 Thousand/µL    Basophils Absolute 0 07 0 00 - 0 10 Thousands/µL   Comprehensive metabolic panel    Collection Time: 11/06/19 12:36 PM   Result Value Ref Range    Sodium 141 136 - 145 mmol/L    Potassium 4 0 3 5 - 5 3 mmol/L    Chloride 110 (H) 100 - 108 mmol/L    CO2 26 21 - 32 mmol/L    ANION GAP 5 4 - 13 mmol/L    BUN 15 5 - 25 mg/dL    Creatinine 0 77 0 60 - 1 30 mg/dL    Glucose, Fasting 109 (H) 65 - 99 mg/dL    Calcium 9 3 8 3 - 10 1 mg/dL    AST 19 5 - 45 U/L    ALT 29 12 - 78 U/L    Alkaline Phosphatase 66 46 - 116 U/L    Total Protein 7 5 6 4 - 8 2 g/dL    Albumin 4 0 3 5 - 5 0 g/dL    Total Bilirubin 0 17 (L) 0 20 - 1 00 mg/dL    eGFR 90 ml/min/1 73sq m   Lipid panel    Collection Time: 11/06/19 12:36 PM   Result Value Ref Range    Cholesterol 183 50 - 200 mg/dL    Triglycerides 180 (H) <=150 mg/dL    HDL, Direct 59 >=40 mg/dL    LDL Calculated 88 0 - 100 mg/dL    Non-HDL-Chol (CHOL-HDL) 124 mg/dl   Hemoglobin A1C    Collection Time: 11/06/19 12:36 PM   Result Value Ref Range    Hemoglobin A1C 5 8 4 2 - 6 3 %     mg/dl   TSH, 3rd generation with Free T4 reflex    Collection Time: 11/06/19 12:36 PM   Result Value Ref Range    TSH 3RD GENERATON 2 280 0 358 - 3 740 uIU/mL   Iron Saturation %    Collection Time: 11/06/19 12:36 PM   Result Value Ref Range    Iron Saturation 8 %    TIBC 339 250 - 450 ug/dL    Iron 27 (L) 50 - 170 ug/dL   Ferritin    Collection Time: 11/06/19 12:36 PM   Result Value Ref Range    Ferritin 4 (L) 8 - 388 ng/mL       The following portions of the patient's history were reviewed and updated as appropriate: allergies, current medications, past family history, past medical history, past social history, past surgical history and problem list      Review of Systems   Constitutional: Negative for appetite change, chills, diaphoresis, fatigue, fever and unexpected weight change  HENT: Negative for congestion, hearing loss and rhinorrhea  Eyes: Negative for visual disturbance     Respiratory: Negative for cough, chest tightness, shortness of breath and wheezing  Cardiovascular: Negative for chest pain, palpitations and leg swelling  Gastrointestinal: Negative for abdominal pain and blood in stool  Endocrine: Negative for cold intolerance, heat intolerance, polydipsia and polyuria  Genitourinary: Negative for difficulty urinating, dysuria, frequency and urgency  Musculoskeletal: Negative for arthralgias and myalgias  Skin: Negative for rash  Neurological: Negative for dizziness, weakness, light-headedness and headaches  Hematological: Does not bruise/bleed easily  Psychiatric/Behavioral: Negative for dysphoric mood and sleep disturbance  Objective:      Vitals:    11/07/19 1251   BP: 130/86   Pulse: 80   Resp: 16          Physical Exam   Constitutional: She is oriented to person, place, and time  She appears well-developed and well-nourished  HENT:   Head: Normocephalic and atraumatic  Nose: Nose normal    Mouth/Throat: Oropharynx is clear and moist    Eyes: Pupils are equal, round, and reactive to light  Conjunctivae and EOM are normal  No scleral icterus  Neck: Normal range of motion  Neck supple  No JVD present  No tracheal deviation present  No thyromegaly present  Cardiovascular: Normal rate, regular rhythm and intact distal pulses  Exam reveals no gallop and no friction rub  No murmur heard  Pulses:       Dorsalis pedis pulses are 1+ on the right side, and 1+ on the left side  Posterior tibial pulses are 1+ on the right side, and 1+ on the left side  Pulmonary/Chest: Effort normal and breath sounds normal  No respiratory distress  She has no wheezes  She has no rales  Musculoskeletal: She exhibits no edema or deformity  LLE BKA   Feet:   Right Foot:   Skin Integrity: Negative for ulcer, skin breakdown, erythema, warmth, callus or dry skin  Left Foot:   Skin Integrity: Negative for ulcer, skin breakdown, erythema, warmth, callus or dry skin  Lymphadenopathy:     She has no cervical adenopathy  Neurological: She is alert and oriented to person, place, and time  No cranial nerve deficit  Skin: Skin is warm and dry  No rash noted  No erythema  No pallor  Psychiatric: She has a normal mood and affect  Her behavior is normal  Judgment and thought content normal    Patient's shoes and socks removed  Right Foot/Ankle   Right Foot Inspection  Skin Exam: skin normal and skin intact no dry skin, no warmth, no callus, no erythema, no maceration, no abnormal color, no pre-ulcer, no ulcer and no callus                          Toe Exam: ROM and strength within normal limits  Sensory       Monofilament testing: intact  Vascular  Capillary refills: < 3 seconds  The right DP pulse is 1+  The right PT pulse is 1+  Left Foot/Ankle  Left Foot Inspection  Skin Exam: skin normal and skin intactno dry skin, no warmth, no erythema, no maceration, normal color, no pre-ulcer, no ulcer and no callus                         Toe Exam: ROM and strength within normal limits                   Sensory       Monofilament: intact  Vascular  Capillary refills: < 3 seconds  The left DP pulse is 1+  The left PT pulse is 1+  Assign Risk Category:  No deformity present;  No loss of protective sensation;        Risk: 0

## 2019-11-07 NOTE — PATIENT INSTRUCTIONS
Lab data reviewed in detail and compared prior    Type 2 diabetes mellitus-A1c was 6 6 in 2016, foot exam unremarkable, check iris exam today, A1c has been stable, now 5 8, continue regular exercise, low carb diet and weight loss efforts  Hyperlipidemia-LDL at goal on Crestor, continue with same    History of recurrent venous thromboembolism-continue on indefinite anticoagulation, will review literature regarding her clinical situation and consideration for decreasing dose to 10 mg  Inflammatory spondyloarthropathy following with Rheumatology    GERD stable on omeprazole    Depression stable on sertraline and Wellbutrin    Iron deficiency anemia with history of tubular adenoma 1 year ago-check fecal occult blood, start oral iron, refer to GI for repeat endoscopies    Routine follow-up after labs in 6 months, sooner as needed

## 2019-11-29 ENCOUNTER — TELEPHONE (OUTPATIENT)
Dept: GASTROENTEROLOGY | Facility: CLINIC | Age: 49
End: 2019-11-29

## 2019-11-29 NOTE — TELEPHONE ENCOUNTER
Spoke with patient she will come in for paperwork and a date she will need reglan sent in for the prep

## 2019-12-02 DIAGNOSIS — F32.A DEPRESSION, UNSPECIFIED DEPRESSION TYPE: ICD-10-CM

## 2019-12-02 RX ORDER — SERTRALINE HYDROCHLORIDE 100 MG/1
TABLET, FILM COATED ORAL
Qty: 30 TABLET | Refills: 0 | Status: SHIPPED | OUTPATIENT
Start: 2019-12-02 | End: 2020-03-02

## 2019-12-06 DIAGNOSIS — E78.49 OTHER HYPERLIPIDEMIA: ICD-10-CM

## 2019-12-06 DIAGNOSIS — F32.A DEPRESSION, UNSPECIFIED DEPRESSION TYPE: ICD-10-CM

## 2019-12-06 RX ORDER — ROSUVASTATIN CALCIUM 10 MG/1
TABLET, COATED ORAL
Qty: 90 TABLET | Refills: 1 | Status: SHIPPED | OUTPATIENT
Start: 2019-12-06 | End: 2020-07-20

## 2019-12-06 RX ORDER — BUPROPION HYDROCHLORIDE 300 MG/1
TABLET ORAL
Qty: 90 TABLET | Refills: 0 | Status: SHIPPED | OUTPATIENT
Start: 2019-12-06 | End: 2020-03-02

## 2020-02-29 DIAGNOSIS — F32.A DEPRESSION, UNSPECIFIED DEPRESSION TYPE: ICD-10-CM

## 2020-02-29 DIAGNOSIS — K21.9 GASTROESOPHAGEAL REFLUX DISEASE WITHOUT ESOPHAGITIS: Primary | ICD-10-CM

## 2020-03-01 DIAGNOSIS — F32.A DEPRESSION, UNSPECIFIED DEPRESSION TYPE: ICD-10-CM

## 2020-03-02 RX ORDER — OMEPRAZOLE 20 MG/1
CAPSULE, DELAYED RELEASE ORAL
Qty: 90 CAPSULE | Refills: 2 | Status: SHIPPED | OUTPATIENT
Start: 2020-03-02 | End: 2020-12-17

## 2020-03-02 RX ORDER — BUPROPION HYDROCHLORIDE 300 MG/1
TABLET ORAL
Qty: 90 TABLET | Refills: 0 | Status: SHIPPED | OUTPATIENT
Start: 2020-03-02 | End: 2020-05-26

## 2020-03-02 RX ORDER — SERTRALINE HYDROCHLORIDE 100 MG/1
TABLET, FILM COATED ORAL
Qty: 30 TABLET | Refills: 0 | Status: SHIPPED | OUTPATIENT
Start: 2020-03-02 | End: 2020-04-27

## 2020-03-18 ENCOUNTER — TELEPHONE (OUTPATIENT)
Dept: INTERNAL MEDICINE CLINIC | Facility: CLINIC | Age: 50
End: 2020-03-18

## 2020-03-18 DIAGNOSIS — J30.9 ALLERGIC RHINITIS, UNSPECIFIED SEASONALITY, UNSPECIFIED TRIGGER: Primary | ICD-10-CM

## 2020-03-18 RX ORDER — MOMETASONE FUROATE 50 UG/1
2 SPRAY, METERED NASAL DAILY
Qty: 1 ACT | Refills: 10 | Status: SHIPPED | OUTPATIENT
Start: 2020-03-18 | End: 2020-12-18

## 2020-04-26 DIAGNOSIS — F32.A DEPRESSION, UNSPECIFIED DEPRESSION TYPE: ICD-10-CM

## 2020-04-27 RX ORDER — SERTRALINE HYDROCHLORIDE 100 MG/1
TABLET, FILM COATED ORAL
Qty: 30 TABLET | Refills: 0 | Status: SHIPPED | OUTPATIENT
Start: 2020-04-27 | End: 2020-06-18

## 2020-05-08 ENCOUNTER — APPOINTMENT (OUTPATIENT)
Dept: LAB | Facility: CLINIC | Age: 50
End: 2020-05-08
Payer: MEDICARE

## 2020-05-08 DIAGNOSIS — E78.5 HYPERLIPIDEMIA, UNSPECIFIED HYPERLIPIDEMIA TYPE: ICD-10-CM

## 2020-05-08 DIAGNOSIS — D50.0 IRON DEFICIENCY ANEMIA DUE TO CHRONIC BLOOD LOSS: ICD-10-CM

## 2020-05-08 DIAGNOSIS — R73.01 IMPAIRED FASTING GLUCOSE: ICD-10-CM

## 2020-05-08 LAB
ALBUMIN SERPL BCP-MCNC: 4.1 G/DL (ref 3.5–5)
ALP SERPL-CCNC: 85 U/L (ref 46–116)
ALT SERPL W P-5'-P-CCNC: 58 U/L (ref 12–78)
ANION GAP SERPL CALCULATED.3IONS-SCNC: 3 MMOL/L (ref 4–13)
AST SERPL W P-5'-P-CCNC: 28 U/L (ref 5–45)
BASOPHILS # BLD AUTO: 0.07 THOUSANDS/ΜL (ref 0–0.1)
BASOPHILS NFR BLD AUTO: 2 % (ref 0–1)
BILIRUB SERPL-MCNC: 0.3 MG/DL (ref 0.2–1)
BUN SERPL-MCNC: 13 MG/DL (ref 5–25)
CALCIUM SERPL-MCNC: 9.7 MG/DL (ref 8.3–10.1)
CHLORIDE SERPL-SCNC: 106 MMOL/L (ref 100–108)
CHOLEST SERPL-MCNC: 194 MG/DL (ref 50–200)
CO2 SERPL-SCNC: 29 MMOL/L (ref 21–32)
CREAT SERPL-MCNC: 0.9 MG/DL (ref 0.6–1.3)
EOSINOPHIL # BLD AUTO: 0.25 THOUSAND/ΜL (ref 0–0.61)
EOSINOPHIL NFR BLD AUTO: 5 % (ref 0–6)
ERYTHROCYTE [DISTWIDTH] IN BLOOD BY AUTOMATED COUNT: 16.1 % (ref 11.6–15.1)
EST. AVERAGE GLUCOSE BLD GHB EST-MCNC: 120 MG/DL
FERRITIN SERPL-MCNC: 10 NG/ML (ref 8–388)
GFR SERPL CREATININE-BSD FRML MDRD: 74 ML/MIN/1.73SQ M
GLUCOSE P FAST SERPL-MCNC: 130 MG/DL (ref 65–99)
HBA1C MFR BLD: 5.8 %
HCT VFR BLD AUTO: 41.4 % (ref 34.8–46.1)
HDLC SERPL-MCNC: 53 MG/DL
HGB BLD-MCNC: 13 G/DL (ref 11.5–15.4)
IMM GRANULOCYTES # BLD AUTO: 0.01 THOUSAND/UL (ref 0–0.2)
IMM GRANULOCYTES NFR BLD AUTO: 0 % (ref 0–2)
IRON SATN MFR SERPL: 19 %
IRON SERPL-MCNC: 65 UG/DL (ref 50–170)
LDLC SERPL CALC-MCNC: 91 MG/DL (ref 0–100)
LYMPHOCYTES # BLD AUTO: 1.81 THOUSANDS/ΜL (ref 0.6–4.47)
LYMPHOCYTES NFR BLD AUTO: 39 % (ref 14–44)
MCH RBC QN AUTO: 27.4 PG (ref 26.8–34.3)
MCHC RBC AUTO-ENTMCNC: 31.4 G/DL (ref 31.4–37.4)
MCV RBC AUTO: 87 FL (ref 82–98)
MONOCYTES # BLD AUTO: 0.36 THOUSAND/ΜL (ref 0.17–1.22)
MONOCYTES NFR BLD AUTO: 8 % (ref 4–12)
NEUTROPHILS # BLD AUTO: 2.19 THOUSANDS/ΜL (ref 1.85–7.62)
NEUTS SEG NFR BLD AUTO: 46 % (ref 43–75)
NONHDLC SERPL-MCNC: 141 MG/DL
NRBC BLD AUTO-RTO: 0 /100 WBCS
PLATELET # BLD AUTO: 234 THOUSANDS/UL (ref 149–390)
PMV BLD AUTO: 11.5 FL (ref 8.9–12.7)
POTASSIUM SERPL-SCNC: 4.4 MMOL/L (ref 3.5–5.3)
PROT SERPL-MCNC: 8.3 G/DL (ref 6.4–8.2)
RBC # BLD AUTO: 4.75 MILLION/UL (ref 3.81–5.12)
SODIUM SERPL-SCNC: 138 MMOL/L (ref 136–145)
TIBC SERPL-MCNC: 349 UG/DL (ref 250–450)
TRIGL SERPL-MCNC: 248 MG/DL
TSH SERPL DL<=0.05 MIU/L-ACNC: 2.59 UIU/ML (ref 0.36–3.74)
WBC # BLD AUTO: 4.69 THOUSAND/UL (ref 4.31–10.16)

## 2020-05-08 PROCEDURE — 83540 ASSAY OF IRON: CPT

## 2020-05-08 PROCEDURE — 80053 COMPREHEN METABOLIC PANEL: CPT

## 2020-05-08 PROCEDURE — 36415 COLL VENOUS BLD VENIPUNCTURE: CPT

## 2020-05-08 PROCEDURE — 82728 ASSAY OF FERRITIN: CPT

## 2020-05-08 PROCEDURE — 84443 ASSAY THYROID STIM HORMONE: CPT

## 2020-05-08 PROCEDURE — 83550 IRON BINDING TEST: CPT

## 2020-05-08 PROCEDURE — 83036 HEMOGLOBIN GLYCOSYLATED A1C: CPT

## 2020-05-08 PROCEDURE — 80061 LIPID PANEL: CPT

## 2020-05-08 PROCEDURE — 85025 COMPLETE CBC W/AUTO DIFF WBC: CPT

## 2020-05-12 ENCOUNTER — TELEPHONE (OUTPATIENT)
Dept: INTERNAL MEDICINE CLINIC | Facility: CLINIC | Age: 50
End: 2020-05-12

## 2020-05-13 ENCOUNTER — OFFICE VISIT (OUTPATIENT)
Dept: INTERNAL MEDICINE CLINIC | Facility: CLINIC | Age: 50
End: 2020-05-13
Payer: MEDICARE

## 2020-05-13 VITALS
RESPIRATION RATE: 12 BRPM | HEIGHT: 63 IN | WEIGHT: 227.4 LBS | HEART RATE: 80 BPM | DIASTOLIC BLOOD PRESSURE: 86 MMHG | SYSTOLIC BLOOD PRESSURE: 124 MMHG | TEMPERATURE: 98.2 F | BODY MASS INDEX: 40.29 KG/M2

## 2020-05-13 DIAGNOSIS — D36.9 TUBULAR ADENOMA: ICD-10-CM

## 2020-05-13 DIAGNOSIS — E11.9 TYPE 2 DIABETES MELLITUS WITHOUT COMPLICATION, WITHOUT LONG-TERM CURRENT USE OF INSULIN (HCC): ICD-10-CM

## 2020-05-13 DIAGNOSIS — M35.9 AUTOIMMUNE DISEASE (HCC): ICD-10-CM

## 2020-05-13 DIAGNOSIS — I26.99 OTHER PULMONARY EMBOLISM WITHOUT ACUTE COR PULMONALE, UNSPECIFIED CHRONICITY (HCC): ICD-10-CM

## 2020-05-13 DIAGNOSIS — K21.9 GASTROESOPHAGEAL REFLUX DISEASE WITHOUT ESOPHAGITIS: Primary | ICD-10-CM

## 2020-05-13 DIAGNOSIS — F33.9 EPISODE OF RECURRENT MAJOR DEPRESSIVE DISORDER, UNSPECIFIED DEPRESSION EPISODE SEVERITY (HCC): ICD-10-CM

## 2020-05-13 DIAGNOSIS — Z11.4 SCREENING FOR HIV (HUMAN IMMUNODEFICIENCY VIRUS): ICD-10-CM

## 2020-05-13 DIAGNOSIS — G90.50 RSD (REFLEX SYMPATHETIC DYSTROPHY): ICD-10-CM

## 2020-05-13 DIAGNOSIS — Z11.59 NEED FOR HEPATITIS C SCREENING TEST: ICD-10-CM

## 2020-05-13 DIAGNOSIS — D50.0 IRON DEFICIENCY ANEMIA DUE TO CHRONIC BLOOD LOSS: ICD-10-CM

## 2020-05-13 DIAGNOSIS — E78.5 HYPERLIPIDEMIA, UNSPECIFIED HYPERLIPIDEMIA TYPE: ICD-10-CM

## 2020-05-13 PROCEDURE — 99214 OFFICE O/P EST MOD 30 MIN: CPT | Performed by: INTERNAL MEDICINE

## 2020-05-13 PROCEDURE — 3044F HG A1C LEVEL LT 7.0%: CPT | Performed by: INTERNAL MEDICINE

## 2020-05-13 PROCEDURE — 1036F TOBACCO NON-USER: CPT | Performed by: INTERNAL MEDICINE

## 2020-05-13 RX ORDER — PILOCARPINE HYDROCHLORIDE 5 MG/1
5 TABLET, FILM COATED ORAL 3 TIMES DAILY PRN
COMMUNITY
End: 2022-02-14 | Stop reason: CLARIF

## 2020-05-26 DIAGNOSIS — F32.A DEPRESSION, UNSPECIFIED DEPRESSION TYPE: ICD-10-CM

## 2020-05-26 RX ORDER — BUPROPION HYDROCHLORIDE 300 MG/1
TABLET ORAL
Qty: 90 TABLET | Refills: 0 | Status: SHIPPED | OUTPATIENT
Start: 2020-05-26 | End: 2020-08-17

## 2020-06-01 ENCOUNTER — TELEPHONE (OUTPATIENT)
Dept: GASTROENTEROLOGY | Facility: CLINIC | Age: 50
End: 2020-06-01

## 2020-06-01 ENCOUNTER — TELEMEDICINE (OUTPATIENT)
Dept: GASTROENTEROLOGY | Facility: CLINIC | Age: 50
End: 2020-06-01
Payer: MEDICARE

## 2020-06-01 DIAGNOSIS — D50.0 IRON DEFICIENCY ANEMIA DUE TO CHRONIC BLOOD LOSS: ICD-10-CM

## 2020-06-01 DIAGNOSIS — D36.9 TUBULAR ADENOMA: Primary | ICD-10-CM

## 2020-06-01 DIAGNOSIS — R10.30 LOWER ABDOMINAL PAIN: ICD-10-CM

## 2020-06-01 DIAGNOSIS — K21.9 GASTROESOPHAGEAL REFLUX DISEASE WITHOUT ESOPHAGITIS: ICD-10-CM

## 2020-06-01 PROCEDURE — 99214 OFFICE O/P EST MOD 30 MIN: CPT | Performed by: PHYSICIAN ASSISTANT

## 2020-06-01 RX ORDER — DICYCLOMINE HYDROCHLORIDE 10 MG/1
10 CAPSULE ORAL EVERY 6 HOURS PRN
Qty: 60 CAPSULE | Refills: 0 | Status: SHIPPED | OUTPATIENT
Start: 2020-06-01 | End: 2020-06-12

## 2020-06-12 DIAGNOSIS — R10.30 LOWER ABDOMINAL PAIN: ICD-10-CM

## 2020-06-12 RX ORDER — DICYCLOMINE HYDROCHLORIDE 10 MG/1
10 CAPSULE ORAL EVERY 6 HOURS PRN
Qty: 60 CAPSULE | Refills: 0 | Status: SHIPPED | OUTPATIENT
Start: 2020-06-12 | End: 2020-06-22

## 2020-06-18 DIAGNOSIS — F32.A DEPRESSION, UNSPECIFIED DEPRESSION TYPE: ICD-10-CM

## 2020-06-18 RX ORDER — SERTRALINE HYDROCHLORIDE 100 MG/1
TABLET, FILM COATED ORAL
Qty: 30 TABLET | Refills: 0 | Status: SHIPPED | OUTPATIENT
Start: 2020-06-18 | End: 2020-08-10 | Stop reason: SDUPTHER

## 2020-06-20 DIAGNOSIS — O22.30 DVT (DEEP VEIN THROMBOSIS) IN PREGNANCY: Primary | ICD-10-CM

## 2020-06-20 DIAGNOSIS — R10.30 LOWER ABDOMINAL PAIN: ICD-10-CM

## 2020-06-22 RX ORDER — DICYCLOMINE HYDROCHLORIDE 10 MG/1
10 CAPSULE ORAL EVERY 6 HOURS PRN
Qty: 60 CAPSULE | Refills: 0 | Status: SHIPPED | OUTPATIENT
Start: 2020-06-22 | End: 2020-07-06 | Stop reason: SDUPTHER

## 2020-06-23 RX ORDER — RIVAROXABAN 20 MG/1
TABLET, FILM COATED ORAL
Qty: 90 TABLET | Refills: 3 | Status: SHIPPED | OUTPATIENT
Start: 2020-06-23 | End: 2021-06-21

## 2020-07-06 DIAGNOSIS — R10.30 LOWER ABDOMINAL PAIN: ICD-10-CM

## 2020-07-06 RX ORDER — DICYCLOMINE HYDROCHLORIDE 10 MG/1
10 CAPSULE ORAL EVERY 6 HOURS PRN
Qty: 120 CAPSULE | Refills: 2 | Status: SHIPPED | OUTPATIENT
Start: 2020-07-06 | End: 2020-11-16

## 2020-07-19 DIAGNOSIS — E78.49 OTHER HYPERLIPIDEMIA: ICD-10-CM

## 2020-07-20 RX ORDER — ROSUVASTATIN CALCIUM 10 MG/1
TABLET, COATED ORAL
Qty: 90 TABLET | Refills: 1 | Status: SHIPPED | OUTPATIENT
Start: 2020-07-20 | End: 2021-01-19

## 2020-08-09 DIAGNOSIS — F32.A DEPRESSION, UNSPECIFIED DEPRESSION TYPE: ICD-10-CM

## 2020-08-10 RX ORDER — SERTRALINE HYDROCHLORIDE 100 MG/1
TABLET, FILM COATED ORAL
Qty: 30 TABLET | Refills: 0 | Status: SHIPPED | OUTPATIENT
Start: 2020-08-10 | End: 2020-10-01

## 2020-08-17 DIAGNOSIS — F32.A DEPRESSION, UNSPECIFIED DEPRESSION TYPE: ICD-10-CM

## 2020-08-17 RX ORDER — BUPROPION HYDROCHLORIDE 300 MG/1
TABLET ORAL
Qty: 90 TABLET | Refills: 0 | Status: SHIPPED | OUTPATIENT
Start: 2020-08-17 | End: 2020-11-09

## 2020-10-01 DIAGNOSIS — F32.A DEPRESSION, UNSPECIFIED DEPRESSION TYPE: ICD-10-CM

## 2020-10-01 RX ORDER — SERTRALINE HYDROCHLORIDE 100 MG/1
TABLET, FILM COATED ORAL
Qty: 30 TABLET | Refills: 0 | Status: SHIPPED | OUTPATIENT
Start: 2020-10-01 | End: 2020-11-23

## 2020-11-09 DIAGNOSIS — F32.A DEPRESSION, UNSPECIFIED DEPRESSION TYPE: ICD-10-CM

## 2020-11-09 RX ORDER — BUPROPION HYDROCHLORIDE 300 MG/1
TABLET ORAL
Qty: 90 TABLET | Refills: 0 | Status: SHIPPED | OUTPATIENT
Start: 2020-11-09 | End: 2021-06-25 | Stop reason: SDUPTHER

## 2020-11-12 ENCOUNTER — LAB (OUTPATIENT)
Dept: LAB | Facility: CLINIC | Age: 50
End: 2020-11-12
Payer: MEDICARE

## 2020-11-12 DIAGNOSIS — E78.5 HYPERLIPIDEMIA, UNSPECIFIED HYPERLIPIDEMIA TYPE: ICD-10-CM

## 2020-11-12 DIAGNOSIS — E11.9 TYPE 2 DIABETES MELLITUS WITHOUT COMPLICATION, WITHOUT LONG-TERM CURRENT USE OF INSULIN (HCC): ICD-10-CM

## 2020-11-12 DIAGNOSIS — D50.0 IRON DEFICIENCY ANEMIA DUE TO CHRONIC BLOOD LOSS: ICD-10-CM

## 2020-11-12 DIAGNOSIS — Z11.59 NEED FOR HEPATITIS C SCREENING TEST: ICD-10-CM

## 2020-11-12 DIAGNOSIS — Z11.4 SCREENING FOR HIV (HUMAN IMMUNODEFICIENCY VIRUS): ICD-10-CM

## 2020-11-12 LAB
ALBUMIN SERPL BCP-MCNC: 4 G/DL (ref 3.5–5)
ALP SERPL-CCNC: 73 U/L (ref 46–116)
ALT SERPL W P-5'-P-CCNC: 36 U/L (ref 12–78)
ANION GAP SERPL CALCULATED.3IONS-SCNC: 3 MMOL/L (ref 4–13)
AST SERPL W P-5'-P-CCNC: 17 U/L (ref 5–45)
BASOPHILS # BLD AUTO: 0.09 THOUSANDS/ΜL (ref 0–0.1)
BASOPHILS NFR BLD AUTO: 2 % (ref 0–1)
BILIRUB SERPL-MCNC: 0.28 MG/DL (ref 0.2–1)
BUN SERPL-MCNC: 12 MG/DL (ref 5–25)
CALCIUM SERPL-MCNC: 9.4 MG/DL (ref 8.3–10.1)
CHLORIDE SERPL-SCNC: 109 MMOL/L (ref 100–108)
CHOLEST SERPL-MCNC: 209 MG/DL (ref 50–200)
CO2 SERPL-SCNC: 30 MMOL/L (ref 21–32)
CREAT SERPL-MCNC: 0.97 MG/DL (ref 0.6–1.3)
EOSINOPHIL # BLD AUTO: 0.25 THOUSAND/ΜL (ref 0–0.61)
EOSINOPHIL NFR BLD AUTO: 4 % (ref 0–6)
ERYTHROCYTE [DISTWIDTH] IN BLOOD BY AUTOMATED COUNT: 16.6 % (ref 11.6–15.1)
EST. AVERAGE GLUCOSE BLD GHB EST-MCNC: 126 MG/DL
FERRITIN SERPL-MCNC: 9 NG/ML (ref 8–388)
GFR SERPL CREATININE-BSD FRML MDRD: 67 ML/MIN/1.73SQ M
GLUCOSE SERPL-MCNC: 120 MG/DL (ref 65–140)
HBA1C MFR BLD: 6 %
HCT VFR BLD AUTO: 41.9 % (ref 34.8–46.1)
HCV AB SER QL: NORMAL
HDLC SERPL-MCNC: 72 MG/DL
HGB BLD-MCNC: 13.1 G/DL (ref 11.5–15.4)
IMM GRANULOCYTES # BLD AUTO: 0.03 THOUSAND/UL (ref 0–0.2)
IMM GRANULOCYTES NFR BLD AUTO: 1 % (ref 0–2)
IRON SATN MFR SERPL: 16 %
IRON SERPL-MCNC: 53 UG/DL (ref 50–170)
LDLC SERPL CALC-MCNC: 84 MG/DL (ref 0–100)
LYMPHOCYTES # BLD AUTO: 2.24 THOUSANDS/ΜL (ref 0.6–4.47)
LYMPHOCYTES NFR BLD AUTO: 37 % (ref 14–44)
MCH RBC QN AUTO: 27 PG (ref 26.8–34.3)
MCHC RBC AUTO-ENTMCNC: 31.3 G/DL (ref 31.4–37.4)
MCV RBC AUTO: 86 FL (ref 82–98)
MONOCYTES # BLD AUTO: 0.57 THOUSAND/ΜL (ref 0.17–1.22)
MONOCYTES NFR BLD AUTO: 9 % (ref 4–12)
NEUTROPHILS # BLD AUTO: 2.92 THOUSANDS/ΜL (ref 1.85–7.62)
NEUTS SEG NFR BLD AUTO: 47 % (ref 43–75)
NONHDLC SERPL-MCNC: 137 MG/DL
NRBC BLD AUTO-RTO: 0 /100 WBCS
PLATELET # BLD AUTO: 253 THOUSANDS/UL (ref 149–390)
PMV BLD AUTO: 11.8 FL (ref 8.9–12.7)
POTASSIUM SERPL-SCNC: 3.8 MMOL/L (ref 3.5–5.3)
PROT SERPL-MCNC: 7.8 G/DL (ref 6.4–8.2)
RBC # BLD AUTO: 4.86 MILLION/UL (ref 3.81–5.12)
SODIUM SERPL-SCNC: 142 MMOL/L (ref 136–145)
TIBC SERPL-MCNC: 341 UG/DL (ref 250–450)
TRIGL SERPL-MCNC: 265 MG/DL
WBC # BLD AUTO: 6.1 THOUSAND/UL (ref 4.31–10.16)

## 2020-11-12 PROCEDURE — 80061 LIPID PANEL: CPT

## 2020-11-12 PROCEDURE — 85025 COMPLETE CBC W/AUTO DIFF WBC: CPT

## 2020-11-12 PROCEDURE — 83036 HEMOGLOBIN GLYCOSYLATED A1C: CPT

## 2020-11-12 PROCEDURE — 36415 COLL VENOUS BLD VENIPUNCTURE: CPT

## 2020-11-12 PROCEDURE — 83550 IRON BINDING TEST: CPT

## 2020-11-12 PROCEDURE — 83540 ASSAY OF IRON: CPT

## 2020-11-12 PROCEDURE — 82728 ASSAY OF FERRITIN: CPT

## 2020-11-12 PROCEDURE — 86803 HEPATITIS C AB TEST: CPT

## 2020-11-12 PROCEDURE — 87389 HIV-1 AG W/HIV-1&-2 AB AG IA: CPT

## 2020-11-12 PROCEDURE — 80053 COMPREHEN METABOLIC PANEL: CPT

## 2020-11-13 LAB — HIV 1+2 AB+HIV1 P24 AG SERPL QL IA: NORMAL

## 2020-11-13 RX ORDER — ETANERCEPT 50 MG/ML
50 SOLUTION SUBCUTANEOUS
COMMUNITY
Start: 2020-10-06 | End: 2021-06-25 | Stop reason: ALTCHOICE

## 2020-11-16 ENCOUNTER — TELEMEDICINE (OUTPATIENT)
Dept: INTERNAL MEDICINE CLINIC | Facility: CLINIC | Age: 50
End: 2020-11-16
Payer: MEDICARE

## 2020-11-16 DIAGNOSIS — G90.50 RSD (REFLEX SYMPATHETIC DYSTROPHY): ICD-10-CM

## 2020-11-16 DIAGNOSIS — F33.9 EPISODE OF RECURRENT MAJOR DEPRESSIVE DISORDER, UNSPECIFIED DEPRESSION EPISODE SEVERITY (HCC): ICD-10-CM

## 2020-11-16 DIAGNOSIS — E11.9 TYPE 2 DIABETES MELLITUS WITHOUT COMPLICATION, WITHOUT LONG-TERM CURRENT USE OF INSULIN (HCC): ICD-10-CM

## 2020-11-16 DIAGNOSIS — D36.9 TUBULAR ADENOMA: ICD-10-CM

## 2020-11-16 DIAGNOSIS — K21.9 GASTROESOPHAGEAL REFLUX DISEASE WITHOUT ESOPHAGITIS: Primary | ICD-10-CM

## 2020-11-16 DIAGNOSIS — M46.97 INFLAMMATORY SPONDYLOPATHY OF LUMBOSACRAL REGION (HCC): ICD-10-CM

## 2020-11-16 DIAGNOSIS — D50.0 IRON DEFICIENCY ANEMIA DUE TO CHRONIC BLOOD LOSS: ICD-10-CM

## 2020-11-16 DIAGNOSIS — Z12.31 ENCOUNTER FOR SCREENING MAMMOGRAM FOR MALIGNANT NEOPLASM OF BREAST: ICD-10-CM

## 2020-11-16 DIAGNOSIS — I26.99 OTHER PULMONARY EMBOLISM WITHOUT ACUTE COR PULMONALE, UNSPECIFIED CHRONICITY (HCC): ICD-10-CM

## 2020-11-16 DIAGNOSIS — E78.5 HYPERLIPIDEMIA, UNSPECIFIED HYPERLIPIDEMIA TYPE: ICD-10-CM

## 2020-11-16 PROCEDURE — 99214 OFFICE O/P EST MOD 30 MIN: CPT | Performed by: INTERNAL MEDICINE

## 2020-11-22 DIAGNOSIS — F32.A DEPRESSION, UNSPECIFIED DEPRESSION TYPE: ICD-10-CM

## 2020-11-23 RX ORDER — SERTRALINE HYDROCHLORIDE 100 MG/1
TABLET, FILM COATED ORAL
Qty: 30 TABLET | Refills: 0 | Status: SHIPPED | OUTPATIENT
Start: 2020-11-23 | End: 2021-01-21

## 2020-12-17 DIAGNOSIS — K21.9 GASTROESOPHAGEAL REFLUX DISEASE WITHOUT ESOPHAGITIS: ICD-10-CM

## 2020-12-17 RX ORDER — OMEPRAZOLE 20 MG/1
CAPSULE, DELAYED RELEASE ORAL
Qty: 90 CAPSULE | Refills: 2 | Status: SHIPPED | OUTPATIENT
Start: 2020-12-17 | End: 2021-06-25 | Stop reason: SDUPTHER

## 2020-12-18 DIAGNOSIS — J30.9 ALLERGIC RHINITIS, UNSPECIFIED SEASONALITY, UNSPECIFIED TRIGGER: ICD-10-CM

## 2020-12-18 RX ORDER — MOMETASONE FUROATE 50 UG/1
SPRAY, METERED NASAL
Qty: 17 G | Refills: 3 | Status: SHIPPED | OUTPATIENT
Start: 2020-12-18 | End: 2021-03-15

## 2021-01-17 DIAGNOSIS — E78.49 OTHER HYPERLIPIDEMIA: ICD-10-CM

## 2021-01-19 RX ORDER — ROSUVASTATIN CALCIUM 10 MG/1
TABLET, COATED ORAL
Qty: 90 TABLET | Refills: 1 | Status: SHIPPED | OUTPATIENT
Start: 2021-01-19 | End: 2021-07-06 | Stop reason: SDUPTHER

## 2021-01-20 DIAGNOSIS — F32.A DEPRESSION, UNSPECIFIED DEPRESSION TYPE: ICD-10-CM

## 2021-01-21 RX ORDER — SERTRALINE HYDROCHLORIDE 100 MG/1
TABLET, FILM COATED ORAL
Qty: 30 TABLET | Refills: 0 | Status: SHIPPED | OUTPATIENT
Start: 2021-01-21 | End: 2021-03-15

## 2021-03-14 DIAGNOSIS — F32.A DEPRESSION, UNSPECIFIED DEPRESSION TYPE: ICD-10-CM

## 2021-03-15 DIAGNOSIS — J30.9 ALLERGIC RHINITIS, UNSPECIFIED SEASONALITY, UNSPECIFIED TRIGGER: ICD-10-CM

## 2021-03-15 RX ORDER — SERTRALINE HYDROCHLORIDE 100 MG/1
TABLET, FILM COATED ORAL
Qty: 30 TABLET | Refills: 0 | Status: SHIPPED | OUTPATIENT
Start: 2021-03-15 | End: 2021-05-07 | Stop reason: SDUPTHER

## 2021-03-15 RX ORDER — MOMETASONE FUROATE 50 UG/1
SPRAY, METERED NASAL
Qty: 17 G | Refills: 1 | Status: SHIPPED | OUTPATIENT
Start: 2021-03-15 | End: 2021-04-08

## 2021-04-07 DIAGNOSIS — J30.9 ALLERGIC RHINITIS, UNSPECIFIED SEASONALITY, UNSPECIFIED TRIGGER: ICD-10-CM

## 2021-04-08 RX ORDER — MOMETASONE FUROATE 50 UG/1
SPRAY, METERED NASAL
Qty: 17 G | Refills: 1 | Status: SHIPPED | OUTPATIENT
Start: 2021-04-08 | End: 2021-05-06

## 2021-05-06 DIAGNOSIS — J30.9 ALLERGIC RHINITIS, UNSPECIFIED SEASONALITY, UNSPECIFIED TRIGGER: ICD-10-CM

## 2021-05-06 RX ORDER — MOMETASONE FUROATE 50 UG/1
SPRAY, METERED NASAL
Qty: 1 ACT | Refills: 1 | Status: SHIPPED | OUTPATIENT
Start: 2021-05-06 | End: 2021-06-02

## 2021-05-07 DIAGNOSIS — F32.A DEPRESSION, UNSPECIFIED DEPRESSION TYPE: ICD-10-CM

## 2021-05-07 RX ORDER — SERTRALINE HYDROCHLORIDE 100 MG/1
TABLET, FILM COATED ORAL
Qty: 30 TABLET | Refills: 0 | Status: SHIPPED | OUTPATIENT
Start: 2021-05-07 | End: 2021-06-25 | Stop reason: SDUPTHER

## 2021-06-02 DIAGNOSIS — J30.9 ALLERGIC RHINITIS, UNSPECIFIED SEASONALITY, UNSPECIFIED TRIGGER: ICD-10-CM

## 2021-06-02 RX ORDER — MOMETASONE FUROATE 50 UG/1
SPRAY, METERED NASAL
Qty: 1 G | Refills: 6 | Status: SHIPPED | OUTPATIENT
Start: 2021-06-02 | End: 2021-11-28

## 2021-06-03 DIAGNOSIS — R23.8 OTHER SKIN CHANGES: Primary | ICD-10-CM

## 2021-06-21 DIAGNOSIS — O22.30 DVT (DEEP VEIN THROMBOSIS) IN PREGNANCY: ICD-10-CM

## 2021-06-21 RX ORDER — RIVAROXABAN 20 MG/1
TABLET, FILM COATED ORAL
Qty: 90 TABLET | Refills: 3 | Status: SHIPPED | OUTPATIENT
Start: 2021-06-21 | End: 2022-06-19

## 2021-06-25 ENCOUNTER — LAB (OUTPATIENT)
Dept: LAB | Facility: CLINIC | Age: 51
End: 2021-06-25
Payer: MEDICARE

## 2021-06-25 ENCOUNTER — OFFICE VISIT (OUTPATIENT)
Dept: INTERNAL MEDICINE CLINIC | Facility: CLINIC | Age: 51
End: 2021-06-25
Payer: MEDICARE

## 2021-06-25 VITALS
OXYGEN SATURATION: 98 % | DIASTOLIC BLOOD PRESSURE: 78 MMHG | BODY MASS INDEX: 40.57 KG/M2 | WEIGHT: 229 LBS | HEIGHT: 63 IN | SYSTOLIC BLOOD PRESSURE: 138 MMHG | TEMPERATURE: 98.8 F | HEART RATE: 80 BPM

## 2021-06-25 DIAGNOSIS — E11.9 TYPE 2 DIABETES MELLITUS WITHOUT COMPLICATION, WITHOUT LONG-TERM CURRENT USE OF INSULIN (HCC): ICD-10-CM

## 2021-06-25 DIAGNOSIS — F32.A DEPRESSION, UNSPECIFIED DEPRESSION TYPE: ICD-10-CM

## 2021-06-25 DIAGNOSIS — K21.9 GASTROESOPHAGEAL REFLUX DISEASE WITHOUT ESOPHAGITIS: ICD-10-CM

## 2021-06-25 DIAGNOSIS — E78.5 HYPERLIPIDEMIA, UNSPECIFIED HYPERLIPIDEMIA TYPE: ICD-10-CM

## 2021-06-25 DIAGNOSIS — F41.9 ANXIETY: ICD-10-CM

## 2021-06-25 DIAGNOSIS — D50.0 IRON DEFICIENCY ANEMIA DUE TO CHRONIC BLOOD LOSS: ICD-10-CM

## 2021-06-25 DIAGNOSIS — L72.0 INCLUSION CYST: Primary | ICD-10-CM

## 2021-06-25 LAB
ALBUMIN SERPL BCP-MCNC: 3.7 G/DL (ref 3.5–5)
ALP SERPL-CCNC: 88 U/L (ref 46–116)
ALT SERPL W P-5'-P-CCNC: 25 U/L (ref 12–78)
ANION GAP SERPL CALCULATED.3IONS-SCNC: 4 MMOL/L (ref 4–13)
AST SERPL W P-5'-P-CCNC: 16 U/L (ref 5–45)
BASOPHILS # BLD AUTO: 0.07 THOUSANDS/ΜL (ref 0–0.1)
BASOPHILS NFR BLD AUTO: 1 % (ref 0–1)
BILIRUB SERPL-MCNC: 0.26 MG/DL (ref 0.2–1)
BUN SERPL-MCNC: 14 MG/DL (ref 5–25)
CALCIUM SERPL-MCNC: 9.3 MG/DL (ref 8.3–10.1)
CHLORIDE SERPL-SCNC: 105 MMOL/L (ref 100–108)
CHOLEST SERPL-MCNC: 179 MG/DL (ref 50–200)
CO2 SERPL-SCNC: 28 MMOL/L (ref 21–32)
CREAT SERPL-MCNC: 0.87 MG/DL (ref 0.6–1.3)
CREAT UR-MCNC: 153 MG/DL
EOSINOPHIL # BLD AUTO: 0.23 THOUSAND/ΜL (ref 0–0.61)
EOSINOPHIL NFR BLD AUTO: 4 % (ref 0–6)
ERYTHROCYTE [DISTWIDTH] IN BLOOD BY AUTOMATED COUNT: 15.5 % (ref 11.6–15.1)
EST. AVERAGE GLUCOSE BLD GHB EST-MCNC: 131 MG/DL
FERRITIN SERPL-MCNC: 11 NG/ML (ref 8–388)
GFR SERPL CREATININE-BSD FRML MDRD: 77 ML/MIN/1.73SQ M
GLUCOSE SERPL-MCNC: 129 MG/DL (ref 65–140)
HBA1C MFR BLD: 6.2 %
HCT VFR BLD AUTO: 41.9 % (ref 34.8–46.1)
HDLC SERPL-MCNC: 51 MG/DL
HGB BLD-MCNC: 13.1 G/DL (ref 11.5–15.4)
IMM GRANULOCYTES # BLD AUTO: 0.02 THOUSAND/UL (ref 0–0.2)
IMM GRANULOCYTES NFR BLD AUTO: 0 % (ref 0–2)
IRON SATN MFR SERPL: 12 %
IRON SERPL-MCNC: 41 UG/DL (ref 50–170)
LDLC SERPL CALC-MCNC: 77 MG/DL (ref 0–100)
LYMPHOCYTES # BLD AUTO: 1.95 THOUSANDS/ΜL (ref 0.6–4.47)
LYMPHOCYTES NFR BLD AUTO: 31 % (ref 14–44)
MCH RBC QN AUTO: 26 PG (ref 26.8–34.3)
MCHC RBC AUTO-ENTMCNC: 31.3 G/DL (ref 31.4–37.4)
MCV RBC AUTO: 83 FL (ref 82–98)
MICROALBUMIN UR-MCNC: 11.2 MG/L (ref 0–20)
MICROALBUMIN/CREAT 24H UR: 7 MG/G CREATININE (ref 0–30)
MONOCYTES # BLD AUTO: 0.59 THOUSAND/ΜL (ref 0.17–1.22)
MONOCYTES NFR BLD AUTO: 9 % (ref 4–12)
NEUTROPHILS # BLD AUTO: 3.46 THOUSANDS/ΜL (ref 1.85–7.62)
NEUTS SEG NFR BLD AUTO: 55 % (ref 43–75)
NONHDLC SERPL-MCNC: 128 MG/DL
NRBC BLD AUTO-RTO: 0 /100 WBCS
PLATELET # BLD AUTO: 254 THOUSANDS/UL (ref 149–390)
PMV BLD AUTO: 12 FL (ref 8.9–12.7)
POTASSIUM SERPL-SCNC: 3.9 MMOL/L (ref 3.5–5.3)
PROT SERPL-MCNC: 8 G/DL (ref 6.4–8.2)
RBC # BLD AUTO: 5.04 MILLION/UL (ref 3.81–5.12)
SODIUM SERPL-SCNC: 137 MMOL/L (ref 136–145)
T4 FREE SERPL-MCNC: 0.99 NG/DL (ref 0.76–1.46)
TIBC SERPL-MCNC: 350 UG/DL (ref 250–450)
TRIGL SERPL-MCNC: 256 MG/DL
TSH SERPL DL<=0.05 MIU/L-ACNC: 3.89 UIU/ML (ref 0.36–3.74)
WBC # BLD AUTO: 6.32 THOUSAND/UL (ref 4.31–10.16)

## 2021-06-25 PROCEDURE — 82728 ASSAY OF FERRITIN: CPT

## 2021-06-25 PROCEDURE — 85025 COMPLETE CBC W/AUTO DIFF WBC: CPT

## 2021-06-25 PROCEDURE — 84439 ASSAY OF FREE THYROXINE: CPT

## 2021-06-25 PROCEDURE — 84443 ASSAY THYROID STIM HORMONE: CPT

## 2021-06-25 PROCEDURE — 92250 FUNDUS PHOTOGRAPHY W/I&R: CPT | Performed by: NURSE PRACTITIONER

## 2021-06-25 PROCEDURE — 82570 ASSAY OF URINE CREATININE: CPT

## 2021-06-25 PROCEDURE — 83540 ASSAY OF IRON: CPT

## 2021-06-25 PROCEDURE — 36415 COLL VENOUS BLD VENIPUNCTURE: CPT

## 2021-06-25 PROCEDURE — 80061 LIPID PANEL: CPT

## 2021-06-25 PROCEDURE — 82043 UR ALBUMIN QUANTITATIVE: CPT

## 2021-06-25 PROCEDURE — 99214 OFFICE O/P EST MOD 30 MIN: CPT | Performed by: NURSE PRACTITIONER

## 2021-06-25 PROCEDURE — G0439 PPPS, SUBSEQ VISIT: HCPCS | Performed by: NURSE PRACTITIONER

## 2021-06-25 PROCEDURE — 83550 IRON BINDING TEST: CPT

## 2021-06-25 PROCEDURE — 83036 HEMOGLOBIN GLYCOSYLATED A1C: CPT

## 2021-06-25 PROCEDURE — 80053 COMPREHEN METABOLIC PANEL: CPT

## 2021-06-25 RX ORDER — SECUKINUMAB 150 MG/ML
300 INJECTION SUBCUTANEOUS
COMMUNITY
Start: 2021-06-21 | End: 2022-02-14 | Stop reason: CLARIF

## 2021-06-25 RX ORDER — OMEPRAZOLE 20 MG/1
20 CAPSULE, DELAYED RELEASE ORAL DAILY
Qty: 90 CAPSULE | Refills: 2 | Status: SHIPPED | OUTPATIENT
Start: 2021-06-25 | End: 2021-07-06 | Stop reason: SDUPTHER

## 2021-06-25 RX ORDER — LORAZEPAM 0.5 MG/1
0.5 TABLET ORAL
Qty: 30 TABLET | Refills: 0 | Status: SHIPPED | OUTPATIENT
Start: 2021-06-25 | End: 2022-06-23 | Stop reason: SDUPTHER

## 2021-06-25 RX ORDER — SERTRALINE HYDROCHLORIDE 100 MG/1
TABLET, FILM COATED ORAL
Qty: 90 TABLET | Refills: 3 | Status: SHIPPED | OUTPATIENT
Start: 2021-06-25 | End: 2022-07-06

## 2021-06-25 RX ORDER — BUPROPION HYDROCHLORIDE 300 MG/1
300 TABLET ORAL EVERY MORNING
Qty: 90 TABLET | Refills: 3 | Status: SHIPPED | OUTPATIENT
Start: 2021-06-25 | End: 2022-07-06

## 2021-06-25 RX ORDER — LORAZEPAM 0.5 MG/1
0.5 TABLET ORAL
Qty: 30 TABLET | Refills: 0 | Status: CANCELLED | OUTPATIENT
Start: 2021-06-25

## 2021-06-25 RX ORDER — SERTRALINE HYDROCHLORIDE 100 MG/1
50 TABLET, FILM COATED ORAL DAILY
Qty: 90 TABLET | Refills: 3 | Status: CANCELLED | OUTPATIENT
Start: 2021-06-25

## 2021-06-25 RX ORDER — DOXYCYCLINE HYCLATE 100 MG
100 TABLET ORAL 2 TIMES DAILY
Qty: 14 TABLET | Refills: 0 | Status: SHIPPED | OUTPATIENT
Start: 2021-06-25 | End: 2021-07-02

## 2021-06-25 NOTE — PATIENT INSTRUCTIONS
Inclusion cyst/ folliculitis to left posterior popliteal region, this is of great concern secondary to BKA and prosthetic, I recommended that she try to avoid using her prosthetic for the next several days    Treat with doxycycline, and follow-up Monday if not improving     depression increase Zoloft to 100 milligrams     check routine labs today, iris completed, foot exam completed

## 2021-06-25 NOTE — PROGRESS NOTES
Assessment and Plan:     Problem List Items Addressed This Visit        Digestive    Gastroesophageal reflux disease without esophagitis    Relevant Medications    omeprazole (PriLOSEC) 20 mg delayed release capsule       Endocrine    Type 2 diabetes mellitus without complication, without long-term current use of insulin ([facility])    Relevant Orders    IRIS Diabetic eye exam       Other    Depression    Relevant Medications    buPROPion (WELLBUTRIN XL) 300 mg 24 hr tablet    sertraline (ZOLOFT) 100 mg tablet    LORazepam (ATIVAN) 0 5 mg tablet      Other Visit Diagnoses     Inclusion cyst    -  Primary    Relevant Medications    doxycycline hyclate (VIBRA-TABS) 100 mg tablet    Anxiety        Relevant Medications    LORazepam (ATIVAN) 0 5 mg tablet           Preventive health issues were discussed with patient, and age appropriate screening tests were ordered as noted in patient's After Visit Summary  Personalized health advice and appropriate referrals for health education or preventive services given if needed, as noted in patient's After Visit Summary       History of Present Illness:     Patient presents for Medicare Annual Wellness visit    Patient Care Team:  Leona Estrada MD as PCP - General (Internal Medicine)  Starlene Niemann, MD Barnie Gosselin, MD as Endoscopist  Ruth Олег Lyon PA-C (Gastroenterology)     Problem List:     Patient Active Problem List   Diagnosis    Inflammatory spondylopathy of lumbosacral region [facility])    Hyperlipidemia    HLA B27 (HLA B27 positive)    Elevated liver enzymes    Depression    Autoimmune disease ([address] )    Pulmonary embolism [address] [address] [facility]    RSD (reflex sympathetic dystrophy)    Unknown skin lesion    Excoriation (skin-picking) disorder    Iron deficiency anemia due to chronic blood loss    Iron deficiency anemia    Gastroesophageal reflux disease without esophagitis    Health maintenance examination    Tubular adenoma    Type 2 diabetes mellitus without complication, without long-term current use of insulin (HCC)      Past Medical and Surgical History:     Past Medical History:   Diagnosis Date    Arthritis     CRPS (complex regional pain syndrome)     [facility]) [facility]     GERD (gastroesophageal reflux disease)     H/O total hysterectomy     Inflammatory spondylopathies ([facility] [address] )     Nonmelanoma skin cancer     LAST ASSESSED: [id number]    PE (pulmonary thromboembolism) ([facility])     PONV (postoperative nausea and vomiting)     Squamous cell carcinoma     Urinary incontinence     Uterine leiomyoma     LAST ASSESSED: [id number]     Past Surgical History:   Procedure Laterality Date    ANKLE SURGERY      BACK SURGERY  2018    BELOW KNEE LEG AMPUTATION      LAST ASSESSED: [id number]    BUNIONECTOMY Right      SECTION      HYSTERECTOMY      LAST ASSESSED: [id number]    OTHER SURGICAL HISTORY      SPINAL STEROTAXIS STIMULATION OF CORD; LAST ASSESSED: [id number]    [place] COLONOSCOPY FLX DX W/COLLJ SPEC WHEN PFRMD N/A 2018    Procedure: COLONOSCOPY;  Surgeon: Sheila Dickson MD;  [facility]; Service: Gastroenterology    [place] ESOPHAGOGASTRODUODENOSCOPY TRANSORAL DIAGNOSTIC N/A 2018    Procedure: ESOPHAGOGASTRODUODENOSCOPY (EGD); Surgeon: Sheila Dickson MD;  [facility];   Service: Gastroenterology    [place] LAP,RMV  ADNEXAL STRUCTURE N/A 11/10/2017    Procedure: LAPAROSCOPIC REMOVAL OF BILATERAL OVARIES;  LYSIS OF ADHESIONS;  Surgeon: Ros Garcia MD;  [facility];  Service: Gynecology    SINUS SURGERY        Family History:     Family History   Problem Relation Age of Onset    Hypertension Mother     Heart disease Mother     Coronary artery disease Mother         Due to calcified coronary lesion     Diabetes Father         Type 2, controlled with neuropathy     Vitiligo Brother     Breast cancer Cousin 28    Breast cancer Other     Vitiligo Daughter       Social History: Social History     Socioeconomic History    Marital status: /Civil Union     Spouse name: None    Number of children: None    Years of education: None    Highest education level: None   Occupational History    Occupation: Retired   Tobacco Use    Smoking status: Never Smoker    Smokeless tobacco: Never Used   Vaping Use    Vaping Use: Never used   Substance and Sexual Activity    Alcohol use: No    Drug use: No    Sexual activity: Yes     Partners: Male   Other Topics Concern    None   Social History Narrative    None     Social Determinants of Health     Financial Resource Strain:     Difficulty of Paying Living Expenses:    Food Insecurity:     Worried About Running Out of Food in the Last Year:     Ran Out of Food in the Last Year:    Transportation Needs:     Lack of Transportation (Medical):      Lack of Transportation (Non-Medical):    Physical Activity:     Days of Exercise per Week:     Minutes of Exercise per Session:    Stress:     Feeling of Stress :    Social Connections:     Frequency of Communication with Friends and Family:     Frequency of Social Gatherings with Friends and Family:     Attends Druze Services:     Active Member of Clubs or Organizations:     Attends Club or Organization Meetings:     Marital Status:    Intimate Partner Violence:     Fear of Current or Ex-Partner:     Emotionally Abused:     Physically Abused:     Sexually Abused:       Medications and Allergies:     Current Outpatient Medications   Medication Sig Dispense Refill    b complex vitamins capsule Take 1 capsule by mouth daily      buPROPion (WELLBUTRIN XL) 300 mg 24 hr tablet Take 1 tablet (300 mg total) by mouth every morning 90 tablet 3    calcium carbonate (OS-APRYL) 600 MG tablet Take 600 mg by mouth 2 (two) times a day with meals      Cholecalciferol (VITAMIN D3) 5000 units CAPS Take by mouth      desonide (DESOWEN) 0 05 % cream Apply topically 2 (two) times a day (Patient taking differently: Apply topically as needed ) 60 g 3    ferrous sulfate 324 (65 Fe) mg Take 1 tablet (324 mg total) by mouth every other day      folic acid (FOLVITE) 1 mg tablet Take 3 tablets by mouth daily        glucosamine-chondroitin 500-400 MG tablet Take 1 tablet by mouth 3 (three) times a day      ketoconazole (NIZORAL) 2 % cream Apply topically daily (Patient taking differently: Apply topically as needed ) 30 g 2    LORazepam (ATIVAN) 0 5 mg tablet Take 1 tablet (0 5 mg total) by mouth daily at bedtime 30 tablet 0    methotrexate 2 5 mg tablet Take 2 5 mg by mouth once a week 8 pills weekly      mometasone (NASONEX) 50 mcg/act nasal spray SPRAY 2 SPRAYS INTO EACH NOSTRIL EVERY DAY 1 g 6    omeprazole (PriLOSEC) 20 mg delayed release capsule Take 1 capsule (20 mg total) by mouth daily 90 capsule 2    pilocarpine (SALAGEN) 5 mg tablet Take 5 mg by mouth 3 (three) times a day as needed      Probiotic Product (PROBIOTIC-10) CAPS Take by mouth      rosuvastatin (CRESTOR) 10 MG tablet TAKE 1 TABLET BY MOUTH EVERY DAY 90 tablet 1    secukinumab (Cosentyx Sensoready Pen) 150 mg/mL SOAJ injection Inject 300 mg under the skin every 28 days      sertraline (ZOLOFT) 100 mg tablet Take 1 po daily 90 tablet 3    sulfaSALAzine (AZULFIDINE) 500 mg tablet Take 500 mg by mouth 2 (two) times a day 3 tabs bid        VITAMIN B COMPLEX-C PO vitamin B complex   Take 1 tablet by mouth once daily   Xarelto 20 MG tablet TAKE 1 TABLET BY MOUTH EVERY DAY 90 tablet 3    doxycycline hyclate (VIBRA-TABS) 100 mg tablet Take 1 tablet (100 mg total) by mouth 2 (two) times a day for 7 days 14 tablet 0     No current facility-administered medications for this visit       Allergies   Allergen Reactions    Leflunomide Shortness Of Breath and Palpitations    Penicillins Hives    Ciprofloxacin Rash    Codeine Itching and Rash    Morphine Itching and Rash      Immunizations:     Immunization History Administered Date(s) Administered    INFLUENZA 11/07/2015, 11/16/2016, 10/05/2017, 10/25/2018, 10/26/2019, 10/31/2020    Influenza Quadrivalent 3 years and older 11/01/2019    Influenza Quadrivalent, 6-35 Months IM 11/16/2016    Influenza, seasonal, injectable 10/07/2010, 10/05/2017    Pneumococcal Polysaccharide PPV23 04/12/2019    Tdap 10/30/2017    influenza, injectable, quadrivalent 11/01/2019      Health Maintenance:         Topic Date Due    MAMMOGRAM  04/16/2020    Colorectal Cancer Screening  12/19/2028    HIV Screening  Completed    Hepatitis C Screening  Completed         Topic Date Due    Pneumococcal Vaccine: Pediatrics (0 to 5 Years) and At-Risk Patients (6 to 59 Years) (2 of 4 - PCV13) 04/12/2020      Medicare Health Risk Assessment:     /78   Pulse 80   Temp 98 8 °F (37 1 °C)   Ht 5' 3" (1 6 m)   Wt 104 kg (229 lb)   SpO2 98%   BMI 40 57 kg/m²      Charis Cheek is here for her Subsequent Wellness visit  Last Medicare Wellness visit information reviewed, patient interviewed and updates made to the record today  Health Risk Assessment:   Patient rates overall health as fair  Patient feels that their physical health rating is slightly worse  Patient is dissatisfied with their life  Eyesight was rated as same  Hearing was rated as same  Patient feels that their emotional and mental health rating is slightly worse  Patients states they are never, rarely angry  Patient states they are often unusually tired/fatigued  Pain experienced in the last 7 days has been a lot  Patient's pain rating has been 7/10  Patient states that she has experienced no weight loss or gain in last 6 months  Depression Screening:   PHQ-2 Score: 2  PHQ-9 Score: 2      Fall Risk Screening: In the past year, patient has experienced: no history of falling in past year      Urinary Incontinence Screening:   Patient has not leaked urine accidently in the last six months       Home Safety:  Patient does not have trouble with stairs inside or outside of their home  Patient has working smoke alarms and has working carbon monoxide detector  Home safety hazards include: none  Nutrition:   Current diet is Regular  Medications:   Patient is currently taking over-the-counter supplements  OTC medications include: see medication list  Patient is able to manage medications  Activities of Daily Living (ADLs)/Instrumental Activities of Daily Living (IADLs):   Walk and transfer into and out of bed and chair?: Yes  Dress and groom yourself?: Yes    Bathe or shower yourself?: Yes    Feed yourself? Yes  Do your laundry/housekeeping?: Yes  Manage your money, pay your bills and track your expenses?: Yes  Make your own meals?: Yes    Do your own shopping?: Yes    Previous Hospitalizations:   Any hospitalizations or ED visits within the last 12 months?: No      Advance Care Planning:     Five wishes given: Yes      Cognitive Screening:   Provider or family/friend/caregiver concerned regarding cognition?: No    PREVENTIVE SCREENINGS      Cardiovascular Screening:    General: Screening Not Indicated and History Lipid Disorder      Diabetes Screening:     General: Screening Not Indicated and History Diabetes      Colorectal Cancer Screening:     General: Screening Current      Breast Cancer Screening:     General: Risks and Benefits Discussed      Cervical Cancer Screening:    General: Screening Not Indicated      Osteoporosis Screening:    General: Risks and Benefits Discussed      Abdominal Aortic Aneurysm (AAA) Screening:        General: Screening Not Indicated      Lung Cancer Screening:     General: Screening Not Indicated      Hepatitis C Screening:    General: Screening Current    Screening, Brief Intervention, and Referral to Treatment (SBIRT)    Screening  Typical number of drinks in a day: 0  Typical number of drinks in a week: 0  Interpretation: Low risk drinking behavior        BRAYDEN Nunn

## 2021-06-25 NOTE — PROGRESS NOTES
Assessment and Plan:     Problem List Items Addressed This Visit        Digestive    Gastroesophageal reflux disease without esophagitis    Relevant Medications    omeprazole (PriLOSEC) 20 mg delayed release capsule       Endocrine    Type 2 diabetes mellitus without complication, without long-term current use of insulin (HCC)    Relevant Orders    IRIS Diabetic eye exam       Other    Depression    Relevant Medications    buPROPion (WELLBUTRIN XL) 300 mg 24 hr tablet    sertraline (ZOLOFT) 100 mg tablet    LORazepam (ATIVAN) 0 5 mg tablet      Other Visit Diagnoses     Inclusion cyst    -  Primary    Relevant Medications    doxycycline hyclate (VIBRA-TABS) 100 mg tablet    Anxiety        Relevant Medications    LORazepam (ATIVAN) 0 5 mg tablet           Preventive health issues were discussed with patient, and age appropriate screening tests were ordered as noted in patient's After Visit Summary  Personalized health advice and appropriate referrals for health education or preventive services given if needed, as noted in patient's After Visit Summary       History of Present Illness:     Patient presents for Medicare Annual Wellness visit    Patient Care Team:  Tila Villafana MD as PCP - General (Internal Medicine)  MD Jose Ramon Jensen MD as Endoscopist  Ruth Harrison PA-C (Gastroenterology)     Problem List:     Patient Active Problem List   Diagnosis    Inflammatory spondylopathy of lumbosacral region [facility])    Hyperlipidemia    HLA B27 (HLA B27 positive)    Elevated liver enzymes    Depression    Autoimmune disease ([address] )    Pulmonary embolism [address] [address] [facility]    RSD (reflex sympathetic dystrophy)    Unknown skin lesion    Excoriation (skin-picking) disorder    Iron deficiency anemia due to chronic blood loss    Iron deficiency anemia    Gastroesophageal reflux disease without esophagitis    Health maintenance examination    Tubular adenoma    Type 2 diabetes mellitus without complication, without long-term current use of insulin ([facility])      Past Medical and Surgical History:     Past Medical History:   Diagnosis Date    Arthritis     CRPS (complex regional pain syndrome)     DVT [facility] venous thrombosis) ([facility])     GERD (gastroesophageal reflux disease)     H/O total hysterectomy     Inflammatory spondylopathies ([facility] [address] )     Nonmelanoma skin cancer     LAST ASSESSED: [id number]    PE [facility]ulmonary thromboembolism) ([facility])     PONV (postoperative nausea and vomiting)     Squamous cell carcinoma     Urinary incontinence     Uterine leiomyoma     LAST ASSESSED: [id number]     Past Surgical History:   Procedure Laterality Date    ANKLE SURGERY      BACK SURGERY  2018    BELOW KNEE LEG AMPUTATION      LAST ASSESSED: [id number]    BUNIONECTOMY Right      SECTION      HYSTERECTOMY      LAST ASSESSED: [id number]    OTHER SURGICAL HISTORY      SPINAL STEROTAXIS STIMULATION OF CORD; LAST ASSESSED: [id number]    [place] COLONOSCOPY FLX DX W/COLLJ SPEC WHEN PFRMD N/A 2018    Procedure: COLONOSCOPY;  Surgeon: Selam Sorenson MD;  [facility]; Service: Gastroenterology    OH ESOPHAGOGASTRODUODENOSCOPY TRANSORAL DIAGNOSTIC N/A 2018    Procedure: ESOPHAGOGASTRODUODENOSCOPY (EGD); Surgeon: Selam Sorenson MD;  [facility];   Service: Gastroenterology    OH LAP,RMV  ADNEXAL STRUCTURE N/A 11/10/2017    Procedure: LAPAROSCOPIC REMOVAL OF BILATERAL OVARIES;  LYSIS OF ADHESIONS;  Surgeon: Gm Bhatia MD;  [facility];  Service: Gynecology    SINUS SURGERY        Family History:     Family History   Problem Relation Age of Onset    Hypertension Mother     Heart disease Mother     Coronary artery disease Mother         Due to calcified coronary lesion     Diabetes Father         Type 2, controlled with neuropathy     Vitiligo Brother     Breast cancer Cousin 28    Breast cancer Other     Vitiligo Daughter       Social History: Social History     Socioeconomic History    Marital status: /Civil Union     Spouse name: None    Number of children: None    Years of education: None    Highest education level: None   Occupational History    Occupation: Retired   Tobacco Use    Smoking status: Never Smoker    Smokeless tobacco: Never Used   Vaping Use    Vaping Use: Never used   Substance and Sexual Activity    Alcohol use: No    Drug use: No    Sexual activity: Yes     Partners: Male   Other Topics Concern    None   Social History Narrative    None     Social Determinants of Health     Financial Resource Strain:     Difficulty of Paying Living Expenses:    Food Insecurity:     Worried About Running Out of Food in the Last Year:     Ran Out of Food in the Last Year:    Transportation Needs:     Lack of Transportation (Medical):      Lack of Transportation (Non-Medical):    Physical Activity:     Days of Exercise per Week:     Minutes of Exercise per Session:    Stress:     Feeling of Stress :    Social Connections:     Frequency of Communication with Friends and Family:     Frequency of Social Gatherings with Friends and Family:     Attends Shinto Services:     Active Member of Clubs or Organizations:     Attends Club or Organization Meetings:     Marital Status:    Intimate Partner Violence:     Fear of Current or Ex-Partner:     Emotionally Abused:     Physically Abused:     Sexually Abused:       Medications and Allergies:     Current Outpatient Medications   Medication Sig Dispense Refill    b complex vitamins capsule Take 1 capsule by mouth daily      buPROPion (WELLBUTRIN XL) 300 mg 24 hr tablet Take 1 tablet (300 mg total) by mouth every morning 90 tablet 3    calcium carbonate (OS-APRYL) 600 MG tablet Take 600 mg by mouth 2 (two) times a day with meals      Cholecalciferol (VITAMIN D3) 5000 units CAPS Take by mouth      desonide (DESOWEN) 0 05 % cream Apply topically 2 (two) times a day (Patient taking differently: Apply topically as needed ) 60 g 3    ferrous sulfate 324 (65 Fe) mg Take 1 tablet (324 mg total) by mouth every other day      folic acid (FOLVITE) 1 mg tablet Take 3 tablets by mouth daily        glucosamine-chondroitin 500-400 MG tablet Take 1 tablet by mouth 3 (three) times a day      ketoconazole (NIZORAL) 2 % cream Apply topically daily (Patient taking differently: Apply topically as needed ) 30 g 2    LORazepam (ATIVAN) 0 5 mg tablet Take 1 tablet (0 5 mg total) by mouth daily at bedtime 30 tablet 0    methotrexate 2 5 mg tablet Take 2 5 mg by mouth once a week 8 pills weekly      mometasone (NASONEX) 50 mcg/act nasal spray SPRAY 2 SPRAYS INTO EACH NOSTRIL EVERY DAY 1 g 6    omeprazole (PriLOSEC) 20 mg delayed release capsule Take 1 capsule (20 mg total) by mouth daily 90 capsule 2    pilocarpine (SALAGEN) 5 mg tablet Take 5 mg by mouth 3 (three) times a day as needed      Probiotic Product (PROBIOTIC-10) CAPS Take by mouth      rosuvastatin (CRESTOR) 10 MG tablet TAKE 1 TABLET BY MOUTH EVERY DAY 90 tablet 1    secukinumab (Cosentyx Sensoready Pen) 150 mg/mL SOAJ injection Inject 300 mg under the skin every 28 days      sertraline (ZOLOFT) 100 mg tablet Take 1 po daily 90 tablet 3    sulfaSALAzine (AZULFIDINE) 500 mg tablet Take 500 mg by mouth 2 (two) times a day 3 tabs bid        VITAMIN B COMPLEX-C PO vitamin B complex   Take 1 tablet by mouth once daily   Xarelto 20 MG tablet TAKE 1 TABLET BY MOUTH EVERY DAY 90 tablet 3    doxycycline hyclate (VIBRA-TABS) 100 mg tablet Take 1 tablet (100 mg total) by mouth 2 (two) times a day for 7 days 14 tablet 0     No current facility-administered medications for this visit       Allergies   Allergen Reactions    Leflunomide Shortness Of Breath and Palpitations    Penicillins Hives    Ciprofloxacin Rash    Codeine Itching and Rash    Morphine Itching and Rash      Immunizations:     Immunization History Administered Date(s) Administered    INFLUENZA 11/07/2015, 11/16/2016, 10/05/2017, 10/25/2018, 10/26/2019, 10/31/2020    Influenza Quadrivalent 3 years and older 11/01/2019    Influenza Quadrivalent, 6-35 Months IM 11/16/2016    Influenza, seasonal, injectable 10/07/2010, 10/05/2017    Pneumococcal Polysaccharide PPV23 04/12/2019    Tdap 10/30/2017    influenza, injectable, quadrivalent 11/01/2019      Health Maintenance:         Topic Date Due    MAMMOGRAM  04/16/2020    Colorectal Cancer Screening  12/19/2028    HIV Screening  Completed    Hepatitis C Screening  Completed         Topic Date Due    Pneumococcal Vaccine: Pediatrics (0 to 5 Years) and At-Risk Patients (6 to 59 Years) (2 of 4 - PCV13) 04/12/2020      Medicare Health Risk Assessment:     /78   Pulse 80   Temp 98 8 °F (37 1 °C)   Ht 5' 3" (1 6 m)   Wt 104 kg (229 lb)   SpO2 98%   BMI 40 57 kg/m²      Deidre Martinez is here for her Subsequent Wellness visit  Health Risk Assessment:   Patient rates overall health as fair  Patient feels that their physical health rating is slightly worse  Patient is dissatisfied with their life  Eyesight was rated as same  Hearing was rated as same  Patient feels that their emotional and mental health rating is slightly worse  Patients states they are never, rarely angry  Patient states they are often unusually tired/fatigued  Pain experienced in the last 7 days has been a lot  Patient's pain rating has been 7/10  Patient states that she has experienced no weight loss or gain in last 6 months  Depression Screening:   PHQ-2 Score: 2  PHQ-9 Score: 2      Fall Risk Screening: In the past year, patient has experienced: no history of falling in past year      Urinary Incontinence Screening:   Patient has not leaked urine accidently in the last six months  Home Safety:  Patient does not have trouble with stairs inside or outside of their home   Patient has working smoke alarms and has working carbon monoxide detector  Home safety hazards include: none  Nutrition:   Current diet is Regular  Medications:   Patient is currently taking over-the-counter supplements  OTC medications include: see medication list  Patient is able to manage medications  Activities of Daily Living (ADLs)/Instrumental Activities of Daily Living (IADLs):   Walk and transfer into and out of bed and chair?: Yes  Dress and groom yourself?: Yes    Bathe or shower yourself?: Yes    Feed yourself? Yes  Do your laundry/housekeeping?: Yes  Manage your money, pay your bills and track your expenses?: Yes  Make your own meals?: Yes    Do your own shopping?: Yes    Previous Hospitalizations:   Any hospitalizations or ED visits within the last 12 months?: No      PREVENTIVE SCREENINGS      Cardiovascular Screening:    General: Screening Not Indicated and History Lipid Disorder      Diabetes Screening:     General: Screening Not Indicated and History Diabetes      Colorectal Cancer Screening:     General: Screening Current      Cervical Cancer Screening:    General: Screening Not Indicated      Lung Cancer Screening:     General: Screening Not Indicated      Hepatitis C Screening:    General: Screening Current    Screening, Brief Intervention, and Referral to Treatment (SBIRT)    Screening  Typical number of drinks in a day: 0  Typical number of drinks in a week: 0  Interpretation: Low risk drinking behavior        BRAYDEN Rider

## 2021-06-25 NOTE — PROGRESS NOTES
Assessment/Plan:    Patient Instructions   Inclusion cyst/ folliculitis to left posterior popliteal region, this is of great concern secondary to BKA and prosthetic, I recommended that she try to avoid using her prosthetic for the next several days  Treat with doxycycline, and follow-up Monday if not improving     depression increase Zoloft to 100 milligrams     check routine labs today, iris completed, foot exam completed         Diagnoses and all orders for this visit:    Inclusion cyst  -     doxycycline hyclate (VIBRA-TABS) 100 mg tablet; Take 1 tablet (100 mg total) by mouth 2 (two) times a day for 7 days    Gastroesophageal reflux disease without esophagitis  -     omeprazole (PriLOSEC) 20 mg delayed release capsule; Take 1 capsule (20 mg total) by mouth daily    Depression, unspecified depression type  -     buPROPion (WELLBUTRIN XL) 300 mg 24 hr tablet; Take 1 tablet (300 mg total) by mouth every morning  -     sertraline (ZOLOFT) 100 mg tablet; Take 1 po daily    Anxiety  -     LORazepam (ATIVAN) 0 5 mg tablet; Take 1 tablet (0 5 mg total) by mouth daily at bedtime    Type 2 diabetes mellitus without complication, without long-term current use of insulin ([facility])  -     IRIS Diabetic eye exam    Other orders  -     secukinumab (Cosentyx Sensoready Pen) 150 mg/mL SOAJ injection; Inject 300 mg under the skin every 28 days  -     Cancel: sertraline (ZOLOFT) 100 mg tablet; Take 0 5 tablets (50 mg total) by mouth daily  -     Cancel: LORazepam (ATIVAN) 0 5 mg tablet; Take 1 tablet (0 5 mg total) by mouth daily at bedtime         Subjective:      Patient ID: Brandie Pino is a 44 y o  female    1 week of skin irritation behind left knee- firm, not warm to touch, not draining  No trauma   Leg w/ prosthetic        Current Outpatient Medications:     b complex vitamins capsule, Take 1 capsule by mouth daily, Disp: , Rfl:     buPROPion (WELLBUTRIN XL) 300 mg 24 hr tablet, Take 1 tablet (300 mg total) by mouth every morning, Disp: 90 tablet, Rfl: 3    calcium carbonate (OS-APRYL) 600 MG tablet, Take 600 mg by mouth 2 (two) times a day with meals, Disp: , Rfl:     Cholecalciferol (VITAMIN D3) 5000 units CAPS, Take by mouth, Disp: , Rfl:     desonide (DESOWEN) 0 05 % cream, Apply topically 2 (two) times a day (Patient taking differently: Apply topically as needed ), Disp: 60 g, Rfl: 3    ferrous sulfate 324 (65 Fe) mg, Take 1 tablet (324 mg total) by mouth every other day, Disp: , Rfl:     folic acid (FOLVITE) 1 mg tablet, Take 3 tablets by mouth daily  , Disp: , Rfl:     glucosamine-chondroitin 500-400 MG tablet, Take 1 tablet by mouth 3 (three) times a day, Disp: , Rfl:     ketoconazole (NIZORAL) 2 % cream, Apply topically daily (Patient taking differently: Apply topically as needed ), Disp: 30 g, Rfl: 2    LORazepam (ATIVAN) 0 5 mg tablet, Take 1 tablet (0 5 mg total) by mouth daily at bedtime, Disp: 30 tablet, Rfl: 0    methotrexate 2 5 mg tablet, Take 2 5 mg by mouth once a week 8 pills weekly, Disp: , Rfl:     mometasone (NASONEX) 50 mcg/act nasal spray, SPRAY 2 SPRAYS INTO EACH NOSTRIL EVERY DAY, Disp: 1 g, Rfl: 6    omeprazole (PriLOSEC) 20 mg delayed release capsule, Take 1 capsule (20 mg total) by mouth daily, Disp: 90 capsule, Rfl: 2    pilocarpine (SALAGEN) 5 mg tablet, Take 5 mg by mouth 3 (three) times a day as needed, Disp: , Rfl:     Probiotic Product (PROBIOTIC-10) CAPS, Take by mouth, Disp: , Rfl:     rosuvastatin (CRESTOR) 10 MG tablet, TAKE 1 TABLET BY MOUTH EVERY DAY, Disp: 90 tablet, Rfl: 1    secukinumab (Cosentyx Sensoready Pen) 150 mg/mL SOAJ injection, Inject 300 mg under the skin every 28 days, Disp: , Rfl:     sertraline (ZOLOFT) 100 mg tablet, Take 1 po daily, Disp: 90 tablet, Rfl: 3    sulfaSALAzine (AZULFIDINE) 500 mg tablet, Take 500 mg by mouth 2 (two) times a day 3 tabs bid  , Disp: , Rfl:     VITAMIN B COMPLEX-C PO, vitamin B complex  Take 1 tablet by mouth once daily  , Disp: , Rfl:     Xarelto 20 MG tablet, TAKE 1 TABLET BY MOUTH EVERY DAY, Disp: 90 tablet, Rfl: 3    doxycycline hyclate (VIBRA-TABS) 100 mg tablet, Take 1 tablet (100 mg total) by mouth 2 (two) times a day for 7 days, Disp: 14 tablet, Rfl: 0    No results found for this or any previous visit (from the past 1008 hour(s))  The following portions of the patient's history were reviewed and updated as appropriate: allergies, current medications, past family history, past medical history, past social history, past surgical history and problem list      Review of Systems   Constitutional: Negative for appetite change, chills, diaphoresis, fatigue, fever and unexpected weight change  HENT: Negative for postnasal drip and sneezing  Eyes: Negative for visual disturbance  Respiratory: Negative for chest tightness and shortness of breath  Cardiovascular: Negative for chest pain, palpitations and leg swelling  Gastrointestinal: Negative for abdominal pain and blood in stool  Endocrine: Negative for cold intolerance, heat intolerance, polydipsia, polyphagia and polyuria  Genitourinary: Negative for difficulty urinating, dysuria, frequency and urgency  Musculoskeletal: Negative for arthralgias and myalgias  Skin: Negative for rash and wound  Skin lesion   Neurological: Negative for dizziness, weakness, light-headedness and headaches  Hematological: Negative for adenopathy  Psychiatric/Behavioral: Negative for confusion, dysphoric mood and sleep disturbance  The patient is not nervous/anxious  Objective:      /78   Pulse 80   Temp 98 8 °F (37 1 °C)   Ht 5' 3" (1 6 m)   Wt 104 kg (229 lb)   SpO2 98%   BMI 40 57 kg/m²        Physical Exam  Constitutional:       General: She is not in acute distress  Appearance: She is well-developed  She is not diaphoretic  HENT:      Head: Normocephalic and atraumatic        Nose: Nose normal    Eyes:      Conjunctiva/sclera: Conjunctivae normal  Pupils: Pupils are equal, round, and reactive to light  Neck:      Thyroid: No thyromegaly  Vascular: No JVD  Trachea: No tracheal deviation  Cardiovascular:      Rate and Rhythm: Normal rate and regular rhythm  Pulses:           Dorsalis pedis pulses are 2+ on the right side  Posterior tibial pulses are 2+ on the right side  Heart sounds: Normal heart sounds  No murmur heard  No friction rub  No gallop  Pulmonary:      Effort: Pulmonary effort is normal  No respiratory distress  Breath sounds: Normal breath sounds  No wheezing or rales  Abdominal:      General: Bowel sounds are normal  There is no distension  Palpations: Abdomen is soft  Tenderness: There is no abdominal tenderness  Musculoskeletal:         General: Normal range of motion  Cervical back: Normal range of motion and neck supple  Feet:      Right foot:      Skin integrity: No ulcer, skin breakdown, erythema, warmth, callus or dry skin  Left foot: amputated  Lymphadenopathy:      Cervical: No cervical adenopathy  Skin:     General: Skin is warm and dry  Findings: No rash  Comments: Left posterior pop 1 5 cm x  5 cm raised firm lesion   Neurological:      Mental Status: She is alert and oriented to person, place, and time  Cranial Nerves: No cranial nerve deficit  Psychiatric:         Behavior: Behavior normal          Thought Content: Thought content normal          Judgment: Judgment normal      BMI Counseling: Body mass index is 40 57 kg/m²  The BMI is above normal  Nutrition recommendations include decreasing portion sizes and decreasing fast food intake  Exercise recommendations include exercising 3-5 times per week  No pharmacotherapy was ordered  Diabetic Foot Exam    Patient's shoes and socks removed  Right Foot/Ankle   Right Foot Inspection  Skin Exam: skin normal and skin intact no dry skin, no warmth, no callus, no erythema, no maceration, no abnormal color, no pre-ulcer, no ulcer and no callus                            Sensory   Vibration: intact  Proprioception: intact   Monofilament testing: intact  Vascular  Capillary refills: < 3 seconds  The right DP pulse is 2+  The right PT pulse is 2+       Left Foot/Ankle  Left Foot Inspection                         Amputation: amputation left foot

## 2021-06-27 LAB
LEFT EYE DIABETIC RETINOPATHY: NORMAL
LEFT EYE IMAGE QUALITY: NORMAL
LEFT EYE MACULAR EDEMA: NORMAL
LEFT EYE OTHER RETINOPATHY: NORMAL
RIGHT EYE DIABETIC RETINOPATHY: NORMAL
RIGHT EYE IMAGE QUALITY: NORMAL
RIGHT EYE MACULAR EDEMA: NORMAL
RIGHT EYE OTHER RETINOPATHY: NORMAL
SEVERITY (EYE EXAM): NORMAL

## 2021-06-28 ENCOUNTER — TELEPHONE (OUTPATIENT)
Dept: INTERNAL MEDICINE CLINIC | Facility: CLINIC | Age: 51
End: 2021-06-28

## 2021-06-28 NOTE — TELEPHONE ENCOUNTER
----- Message from Melquiades Dumont sent at 6/28/2021  3:58 PM EDT -----  Her in office eye exam was normal

## 2021-07-02 ENCOUNTER — TELEPHONE (OUTPATIENT)
Dept: INTERNAL MEDICINE CLINIC | Facility: CLINIC | Age: 51
End: 2021-07-02

## 2021-07-02 NOTE — TELEPHONE ENCOUNTER
----- Message from Melquiades Dumont sent at 7/2/2021 12:25 PM EDT -----  Have her make a non urgent follow up next week

## 2021-07-06 ENCOUNTER — OFFICE VISIT (OUTPATIENT)
Dept: INTERNAL MEDICINE CLINIC | Facility: CLINIC | Age: 51
End: 2021-07-06
Payer: MEDICARE

## 2021-07-06 VITALS
SYSTOLIC BLOOD PRESSURE: 128 MMHG | HEIGHT: 63 IN | DIASTOLIC BLOOD PRESSURE: 78 MMHG | TEMPERATURE: 97.7 F | HEART RATE: 84 BPM | OXYGEN SATURATION: 98 % | WEIGHT: 266 LBS | BODY MASS INDEX: 47.13 KG/M2 | RESPIRATION RATE: 16 BRPM

## 2021-07-06 DIAGNOSIS — E78.5 HYPERLIPIDEMIA, UNSPECIFIED HYPERLIPIDEMIA TYPE: ICD-10-CM

## 2021-07-06 DIAGNOSIS — M45.0 ANKYLOSING SPONDYLITIS OF MULTIPLE SITES IN SPINE (HCC): ICD-10-CM

## 2021-07-06 DIAGNOSIS — I26.99 OTHER PULMONARY EMBOLISM WITHOUT ACUTE COR PULMONALE, UNSPECIFIED CHRONICITY (HCC): ICD-10-CM

## 2021-07-06 DIAGNOSIS — D89.89 OTHER SPECIFIED DISORDERS INVOLVING THE IMMUNE MECHANISM, NOT ELSEWHERE CLASSIFIED (HCC): ICD-10-CM

## 2021-07-06 DIAGNOSIS — E66.01 OBESITY, MORBID (HCC): ICD-10-CM

## 2021-07-06 DIAGNOSIS — F33.9 EPISODE OF RECURRENT MAJOR DEPRESSIVE DISORDER, UNSPECIFIED DEPRESSION EPISODE SEVERITY (HCC): ICD-10-CM

## 2021-07-06 DIAGNOSIS — M46.97 INFLAMMATORY SPONDYLOPATHY OF LUMBOSACRAL REGION (HCC): ICD-10-CM

## 2021-07-06 DIAGNOSIS — M35.9 AUTOIMMUNE DISEASE (HCC): ICD-10-CM

## 2021-07-06 DIAGNOSIS — D50.0 IRON DEFICIENCY ANEMIA DUE TO CHRONIC BLOOD LOSS: ICD-10-CM

## 2021-07-06 DIAGNOSIS — I20.8 EXERTIONAL ANGINA (HCC): ICD-10-CM

## 2021-07-06 DIAGNOSIS — E78.49 OTHER HYPERLIPIDEMIA: ICD-10-CM

## 2021-07-06 DIAGNOSIS — E11.9 TYPE 2 DIABETES MELLITUS WITHOUT COMPLICATION, WITHOUT LONG-TERM CURRENT USE OF INSULIN (HCC): ICD-10-CM

## 2021-07-06 DIAGNOSIS — K21.9 GASTROESOPHAGEAL REFLUX DISEASE WITHOUT ESOPHAGITIS: ICD-10-CM

## 2021-07-06 DIAGNOSIS — K63.5 POLYP OF COLON, UNSPECIFIED PART OF COLON, UNSPECIFIED TYPE: Primary | ICD-10-CM

## 2021-07-06 DIAGNOSIS — K51.40 PSEUDOPOLYPOSIS OF COLON WITHOUT COMPLICATION, UNSPECIFIED PART OF COLON (HCC): ICD-10-CM

## 2021-07-06 PROBLEM — I20.89 EXERTIONAL ANGINA: Status: ACTIVE | Noted: 2021-07-06

## 2021-07-06 PROCEDURE — 99214 OFFICE O/P EST MOD 30 MIN: CPT | Performed by: NURSE PRACTITIONER

## 2021-07-06 RX ORDER — OMEPRAZOLE 40 MG/1
40 CAPSULE, DELAYED RELEASE ORAL DAILY
Qty: 90 CAPSULE | Refills: 3 | Status: SHIPPED | OUTPATIENT
Start: 2021-07-06 | End: 2022-06-23 | Stop reason: SDUPTHER

## 2021-07-06 RX ORDER — ROSUVASTATIN CALCIUM 10 MG/1
10 TABLET, COATED ORAL DAILY
Qty: 90 TABLET | Refills: 1 | Status: SHIPPED | OUTPATIENT
Start: 2021-07-06 | End: 2021-12-01

## 2021-07-06 NOTE — PROGRESS NOTES
Assessment/Plan:    Patient Instructions    History of left BKA, prosthetic is causing irritation, recommend she discuss with the prosthetic company just to avoid further problems     iron deficiency anemia with multiple large colon polyps showing tubular adenoma, refer back to GI for colonoscopy     prediabetes, A1c is up to 6 2 discussed the importance of lifestyle modifications diet exercise weight loss     elevated triglycerides recommend low starch diet     intermittent nausea, with history of esophageal ulceration in the past, she has increased stress, she also recently was on antibiotics, Cosentyx was increased and Zoloft was increased, will increase omeprazole to 40 mg daily and she is already going to follow up with GI     subclinical hypothyroidism, no indication to treat at this time continue to monitor      Autoimmune disease following with Rheumatology recently had Cosentyx increased         Diagnoses and all orders for this visit:    Polyp of colon, unspecified part of colon, unspecified type  -     Ambulatory referral to Gastroenterology; Future    Gastroesophageal reflux disease without esophagitis  -     omeprazole (PriLOSEC) 40 MG capsule; Take 1 capsule (40 mg total) by mouth daily    Other hyperlipidemia  -     rosuvastatin (CRESTOR) 10 MG tablet; Take 1 tablet (10 mg total) by mouth daily  -     TSH, 3rd generation with Free T4 reflex; Future    Type 2 diabetes mellitus without complication, without long-term current use of insulin ([facility])  -     Hemoglobin A1C; Future    Hyperlipidemia, unspecified hyperlipidemia type  -     CBC and differential; Future  -     Comprehensive metabolic panel; Future  -     Lipid panel; Future    Iron deficiency anemia due to chronic blood loss  -     Iron Panel (Includes Ferritin, Iron Sat%, Iron, and TIBC);  Future    Other specified disorders involving the immune mechanism, not elsewhere classified ([facility] )    Episode of recurrent major depressive disorder, unspecified depression episode severity ([facility] )    Pseudopolyposis of colon without complication, unspecified part of [facility] )    Obesity, morbid ([facility]  )    Exertional angina ([facility]  )    Ankylosing spondylitis of multiple sites in spine ([facility] )    Other pulmonary embolism without acute cor pulmonale, unspecified chronicity ([facility] )    Inflammatory spondylopathy of lumbosacral region ([facility] )    Autoimmune disease ([place] [address] )         Subjective:      Patient ID: Jacky Naylor is a 44 y o  female      Patient is here to follow up labs, and her wound, the wound to the back of her knee on the left seems to be better but it still seems to be raised, she is just worried because of her prosthetic  She also complains of feeling tired all the time, she also has increased pain, she did recently have her Cosentyx increased  She has no noticed increase in nausea  She takes her iron only once or twice a week just because that is when she remembers  She does admit she is overdue for colonoscopy          Current Outpatient Medications:     b complex vitamins capsule, Take 1 capsule by mouth daily, Disp: , Rfl:     buPROPion (WELLBUTRIN XL) 300 mg 24 hr tablet, Take 1 tablet (300 mg total) by mouth every morning, Disp: 90 tablet, Rfl: 3    calcium carbonate (OS-APRYL) 600 MG tablet, Take 600 mg by mouth 2 (two) times a day with meals, Disp: , Rfl:     Cholecalciferol (VITAMIN D3) 5000 units CAPS, Take by mouth, Disp: , Rfl:     desonide (DESOWEN) 0 05 % cream, Apply topically 2 (two) times a day (Patient taking differently: Apply topically as needed ), Disp: 60 g, Rfl: 3    ferrous sulfate 324 (65 Fe) mg, Take 1 tablet (324 mg total) by mouth every other day, Disp: , Rfl:     folic acid (FOLVITE) 1 mg tablet, Take 3 tablets by mouth daily  , Disp: , Rfl:     glucosamine-chondroitin 500-400 MG tablet, Take 1 tablet by mouth 3 (three) times a day, Disp: , Rfl:     LORazepam (ATIVAN) 0 5 mg tablet, Take 1 tablet (0 5 mg total) by mouth daily at bedtime, Disp: 30 tablet, Rfl: 0    methotrexate 2 5 mg tablet, Take 2 5 mg by mouth once a week 8 pills weekly, Disp: , Rfl:     mometasone (NASONEX) 50 mcg/act nasal spray, SPRAY 2 SPRAYS INTO EACH NOSTRIL EVERY DAY, Disp: 1 g, Rfl: 6    omeprazole (PriLOSEC) 40 MG capsule, Take 1 capsule (40 mg total) by mouth daily, Disp: 90 capsule, Rfl: 3    pilocarpine (SALAGEN) 5 mg tablet, Take 5 mg by mouth 3 (three) times a day as needed, Disp: , Rfl:     Probiotic Product (PROBIOTIC-10) CAPS, Take by mouth, Disp: , Rfl:     rosuvastatin (CRESTOR) 10 MG tablet, Take 1 tablet (10 mg total) by mouth daily, Disp: 90 tablet, Rfl: 1    secukinumab (Cosentyx Sensoready Pen) 150 mg/mL SOAJ injection, Inject 300 mg under the skin every 28 days, Disp: , Rfl:     sertraline (ZOLOFT) 100 mg tablet, Take 1 po daily, Disp: 90 tablet, Rfl: 3    sulfaSALAzine (AZULFIDINE) 500 mg tablet, Take 500 mg by mouth 2 (two) times a day 3 tabs bid  , Disp: , Rfl:     VITAMIN B COMPLEX-C PO, vitamin B complex  Take 1 tablet by mouth once daily  , Disp: , Rfl:     Xarelto 20 MG tablet, TAKE 1 TABLET BY MOUTH EVERY DAY, Disp: 90 tablet, Rfl: 3    Recent Results (from the past 1008 hour(s))   IRIS Diabetic eye exam    Collection Time: 21  9:16 AM   Result Value Ref Range    Severity NORMAL     Right Eye Diabetic Retinopathy None     Right Eye Macular Edema None     Right Eye Other Retinopathy None     Right Eye Image Quality Gradeable Image     Left Eye Diabetic Retinopathy None     Left Eye Macular Edema None     Left Eye Other Retinopathy None     Left Eye Image Quality Gradeable Image     Result Retinal Study Result for [facility]     Result      Result       [facility], a 45 y/o, F (: 1970, MRN: [id number])    Result       presented to [facility] on 2021 for a retinal imaging study of the left and right eyes      Result      Result       Based on the findings of the study, the following is recommended for [facility]    Result       Normal Study: Re-scan the patient in 12 months or in the next calendar year  Result      Result       Interpreting Provider's Comments:  No comments provided    Result      Result       Right eye findings: Negative for Diabetic Retinopathy    Result Negative for Macular Edema     Result      Result       Left eye findings: Negative for Diabetic Retinopathy    Result Negative for Macular Edema     Result      Result      Result       This result was electronically signed by Joann Condon, NPI: [id number], Taxonomy: [id number] on 6/27/2021 10:28 AM     Result      Result       NOTE:  Any pathology noted on this diabetic retinal evaluation should be confirmed by an appropriate ophthalmic examination  Microalbumin / creatinine urine ratio    Collection Time: 06/25/21  9:23 AM   Result Value Ref Range    Creatinine, Ur 153 0 mg/dL    Microalbum  ,U,Random 11 2 0 0 - 20 0 mg/L    Microalb Creat Ratio 7 0 - 30 mg/g creatinine   CBC and differential    Collection Time: 06/25/21  9:23 AM   Result Value Ref Range    WBC 6 32 4 31 - 10 16 Thousand/uL    RBC 5 04 3 81 - 5 12 Million/uL    Hemoglobin 13 1 11 5 - 15 4 g/dL    Hematocrit 41 9 34 8 - 46 1 %    MCV 83 82 - 98 fL    MCH 26 0 (L) 26 8 - 34 3 pg    MCHC 31 3 (L) 31 4 - 37 4 g/dL    RDW 15 5 (H) 11 6 - 15 1 %    MPV 12 0 8 9 - 12 7 fL    Platelets 119 251 - 890 Thousands/uL    nRBC 0 /100 WBCs    Neutrophils Relative 55 43 - 75 %    Immat GRANS % 0 0 - 2 %    Lymphocytes Relative 31 14 - 44 %    Monocytes Relative 9 4 - 12 %    Eosinophils Relative 4 0 - 6 %    Basophils Relative 1 0 - 1 %    Neutrophils Absolute 3 46 1 85 - 7 62 Thousands/µL    Immature Grans Absolute 0 02 0 00 - 0 20 Thousand/uL    Lymphocytes Absolute 1 95 0 60 - 4 47 Thousands/µL    Monocytes Absolute 0 59 0 17 - 1 22 Thousand/µL    Eosinophils Absolute 0 23 0 00 - 0 61 Thousand/µL    Basophils Absolute 0 07 0 00 - 0 10 Thousands/µL   Comprehensive metabolic panel    Collection Time: 06/25/21  9:23 AM   Result Value Ref Range    Sodium 137 136 - 145 mmol/L    Potassium 3 9 3 5 - 5 3 mmol/L    Chloride 105 100 - 108 mmol/L    CO2 28 21 - 32 mmol/L    ANION GAP 4 4 - 13 mmol/L    BUN 14 5 - 25 mg/dL    Creatinine 0 87 0 60 - 1 30 mg/dL    Glucose 129 65 - 140 mg/dL    Calcium 9 3 8 3 - 10 1 mg/dL    AST 16 5 - 45 U/L    ALT 25 12 - 78 U/L    Alkaline Phosphatase 88 46 - 116 U/L    Total Protein 8 0 6 4 - 8 2 g/dL    Albumin 3 7 3 5 - 5 0 g/dL    Total Bilirubin 0 26 0 20 - 1 00 mg/dL    eGFR 77 ml/min/1 73sq m   Lipid panel    Collection Time: 06/25/21  9:23 AM   Result Value Ref Range    Cholesterol 179 50 - 200 mg/dL    Triglycerides 256 (H) <=150 mg/dL    HDL, Direct 51 >=40 mg/dL    LDL Calculated 77 0 - 100 mg/dL    Non-HDL-Chol (CHOL-HDL) 128 mg/dl   Hemoglobin A1C    Collection Time: 06/25/21  9:23 AM   Result Value Ref Range    Hemoglobin A1C 6 2 (H) Normal 3 8-5 6%; PreDiabetic 5 7-6 4%; Diabetic >=6 5%; Glycemic control for adults with diabetes <7 0% %     mg/dl   TSH, 3rd generation with Free T4 reflex    Collection Time: 06/25/21  9:23 AM   Result Value Ref Range    TSH 3RD GENERATON 3 890 (H) 0 358 - 3 740 uIU/mL   Iron Saturation %    Collection Time: 06/25/21  9:23 AM   Result Value Ref Range    Iron Saturation 12 %    TIBC 350 250 - 450 ug/dL    Iron 41 (L) 50 - 170 ug/dL   Ferritin    Collection Time: 06/25/21  9:23 AM   Result Value Ref Range    Ferritin 11 8 - 388 ng/mL   T4, free    Collection Time: 06/25/21  9:23 AM   Result Value Ref Range    Free T4 0 99 0 76 - 1 46 ng/dL       The following portions of the patient's history were reviewed and updated as appropriate: allergies, current medications, past family history, past medical history, past social history, past surgical history and problem list      Review of Systems   Constitutional: Positive for fatigue   Negative for appetite change, chills, diaphoresis, fever and unexpected weight change  HENT: Negative for postnasal drip and sneezing  Eyes: Negative for visual disturbance  Respiratory: Negative for chest tightness and shortness of breath  Cardiovascular: Negative for chest pain, palpitations and leg swelling  Gastrointestinal: Negative for abdominal pain and blood in stool  Endocrine: Negative for cold intolerance, heat intolerance, polydipsia, polyphagia and polyuria  Genitourinary: Negative for difficulty urinating, dysuria, frequency and urgency  Musculoskeletal: Positive for arthralgias  Negative for myalgias  Skin: Negative for rash and wound  Neurological: Negative for dizziness, weakness, light-headedness and headaches  Hematological: Negative for adenopathy  Psychiatric/Behavioral: Negative for confusion, dysphoric mood and sleep disturbance  The patient is not nervous/anxious  Objective:      /78 (BP Location: Left arm, Patient Position: Sitting, Cuff Size: Large)   Pulse 84   Temp 97 7 °F (36 5 °C) (Tympanic)   Resp 16   Ht 5' 3" (1 6 m)   Wt 121 kg (266 lb)   SpO2 98%   BMI 47 12 kg/m²        Physical Exam  Constitutional:       General: She is not in acute distress  Appearance: She is well-developed  She is not diaphoretic  HENT:      Head: Normocephalic and atraumatic  Nose: Nose normal    Eyes:      Conjunctiva/sclera: Conjunctivae normal       Pupils: Pupils are equal, round, and reactive to light  Neck:      Thyroid: No thyromegaly  Vascular: No JVD  Trachea: No tracheal deviation  Cardiovascular:      Rate and Rhythm: Normal rate and regular rhythm  Pulses:           Dorsalis pedis pulses are 2+ on the right side  Posterior tibial pulses are 2+ on the right side  Heart sounds: Normal heart sounds  No murmur heard  No friction rub  No gallop  Pulmonary:      Effort: Pulmonary effort is normal  No respiratory distress  Breath sounds: Normal breath sounds  No wheezing or rales  Abdominal:      General: Bowel sounds are normal  There is no distension  Palpations: Abdomen is soft  Tenderness: There is no abdominal tenderness  Musculoskeletal:         General: Normal range of motion  Cervical back: Normal range of motion and neck supple  Feet:      Right foot:      Skin integrity: Callus present  No ulcer, skin breakdown, erythema, warmth or dry skin  Left foot: amputated  Lymphadenopathy:      Cervical: No cervical adenopathy  Skin:     General: Skin is warm and dry  Findings: No rash  Neurological:      Mental Status: She is alert and oriented to person, place, and time  Cranial Nerves: No cranial nerve deficit  Psychiatric:         Behavior: Behavior normal          Thought Content: Thought content normal          Judgment: Judgment normal      Diabetic Foot Exam    Patient's shoes and socks removed  Right Foot/Ankle   Right Foot Inspection  Skin Exam: skin normal, skin intact, callus and callus no dry skin, no warmth, no erythema, no maceration, no abnormal color, no pre-ulcer and no ulcer                            Sensory   Vibration: intact  Proprioception: intact   Monofilament testing: intact  Vascular  Capillary refills: < 3 seconds  The right DP pulse is 2+  The right PT pulse is 2+       Left Foot/Ankle  Left Foot Inspection                         Amputation: amputation left foot                       Assign Risk Category:  No deformity present; ;        Risk: 1

## 2021-07-06 NOTE — PATIENT INSTRUCTIONS
History of left BKA, prosthetic is causing irritation, recommend she discuss with the prosthetic company just to avoid further problems     iron deficiency anemia with multiple large colon polyps showing tubular adenoma, refer back to GI for colonoscopy     prediabetes, A1c is up to 6 2 discussed the importance of lifestyle modifications diet exercise weight loss     elevated triglycerides recommend low starch diet     intermittent nausea, with history of esophageal ulceration in the past, she has increased stress, she also recently was on antibiotics, Cosentyx was increased and Zoloft was increased, will increase omeprazole to 40 mg daily and she is already going to follow up with GI     subclinical hypothyroidism, no indication to treat at this time continue to monitor      Autoimmune disease following with Rheumatology recently had Cosentyx increased

## 2021-09-27 RX ORDER — ETANERCEPT 50 MG/ML
SOLUTION SUBCUTANEOUS
COMMUNITY
End: 2022-02-14 | Stop reason: CLARIF

## 2021-09-27 RX ORDER — SECUKINUMAB 150 MG/ML
INJECTION SUBCUTANEOUS
COMMUNITY
End: 2022-02-14 | Stop reason: CLARIF

## 2021-09-27 RX ORDER — DOXYCYCLINE HYCLATE 100 MG
TABLET ORAL
COMMUNITY
End: 2021-11-01 | Stop reason: CLARIF

## 2021-09-27 RX ORDER — PREDNISONE 1 MG/1
TABLET ORAL
COMMUNITY
Start: 2021-09-09 | End: 2021-11-01 | Stop reason: CLARIF

## 2021-09-27 RX ORDER — METHYLPREDNISOLONE 4 MG/1
TABLET ORAL
COMMUNITY
End: 2021-11-01 | Stop reason: CLARIF

## 2021-09-27 RX ORDER — HYDROCODONE BITARTRATE AND ACETAMINOPHEN 5; 325 MG/1; MG/1
1 TABLET ORAL EVERY 8 HOURS PRN
COMMUNITY
Start: 2021-09-09

## 2021-09-28 ENCOUNTER — OFFICE VISIT (OUTPATIENT)
Dept: GASTROENTEROLOGY | Facility: CLINIC | Age: 51
End: 2021-09-28
Payer: MEDICARE

## 2021-09-28 VITALS
HEART RATE: 78 BPM | SYSTOLIC BLOOD PRESSURE: 122 MMHG | WEIGHT: 218.2 LBS | HEIGHT: 63 IN | BODY MASS INDEX: 38.66 KG/M2 | DIASTOLIC BLOOD PRESSURE: 78 MMHG

## 2021-09-28 DIAGNOSIS — Z12.11 SCREENING FOR MALIGNANT NEOPLASM OF COLON: ICD-10-CM

## 2021-09-28 DIAGNOSIS — D36.9 TUBULAR ADENOMA: Primary | ICD-10-CM

## 2021-09-28 PROCEDURE — 99214 OFFICE O/P EST MOD 30 MIN: CPT | Performed by: PHYSICIAN ASSISTANT

## 2021-09-28 RX ORDER — SECUKINUMAB 150 MG/ML
INJECTION SUBCUTANEOUS
COMMUNITY
Start: 2021-08-14 | End: 2022-02-14 | Stop reason: CLARIF

## 2021-09-28 NOTE — PROGRESS NOTES
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  She was due for a 1 year recall  She denies change in bowel habits, unintentional weight loss, abdominal pain or signs of overt GIB  Her last EGD was in 2018 revealing fundic gland polyps  She is on omeprazole 40 mg, and reports her reflux is currently controlled  Review of Systems:    CONSTITUTIONAL: Denies any fever, chills, or rigors  Good appetite, and no recent weight loss  HEENT: No earache or tinnitus  Denies hearing loss or visual disturbances  CARDIOVASCULAR: No chest pain or palpitations  RESPIRATORY: Denies any cough, hemoptysis, shortness of breath or dyspnea on exertion  GASTROINTESTINAL: As noted in the History of Present Illness  GENITOURINARY: No problems with urination  Denies any hematuria or dysuria  NEUROLOGIC: No dizziness or vertigo, denies headaches  MUSCULOSKELETAL: Denies any muscle or joint pain  SKIN: Denies skin rashes or itching  ENDOCRINE: Denies excessive thirst  Denies intolerance to heat or cold  PSYCHOSOCIAL: Denies depression or anxiety  Denies any recent memory loss         Current Outpatient Medications   Medication Sig Dispense Refill    b complex vitamins capsule Take 1 capsule by mouth daily      buPROPion (WELLBUTRIN XL) 300 mg 24 hr tablet Take 1 tablet (300 mg total) by mouth every morning 90 tablet 3    calcium carbonate (OS-APRYL) 600 MG tablet Take 600 mg by mouth 2 (two) times a day with meals      Cholecalciferol (VITAMIN D3) 5000 units CAPS Take by mouth      Cosentyx Sensoready, 300 MG, 150 MG/ML SOAJ injection       desonide (DESOWEN) 0 05 % cream Apply topically 2 (two) times a day (Patient taking differently: Apply topically as needed ) 60 g 3    doxycycline hyclate (VIBRA-TABS) 100 mg tablet doxycycline hyclate 100 mg tablet   TAKE 1 TABLET BY MOUTH TWICE A DAY FOR 7 DAYS      etanercept (Enbrel) 50 mg/mL SOSY Enbrel 50 mg/mL (1 mL) subcutaneous syringe      ferrous sulfate 324 (65 Fe) mg Take 1 tablet (324 mg total) by mouth every other day      folic acid (FOLVITE) 1 mg tablet Take 3 tablets by mouth daily        glucosamine-chondroitin 500-400 MG tablet Take 1 tablet by mouth 3 (three) times a day      HYDROcodone-acetaminophen (NORCO) 5-325 mg per tablet Take 1 tablet by mouth every 8 (eight) hours as needed      LORazepam (ATIVAN) 0 5 mg tablet Take 1 tablet (0 5 mg total) by mouth daily at bedtime 30 tablet 0    methotrexate 2 5 mg tablet Take 2 5 mg by mouth once a week 8 pills weekly      methylprednisolone (MEDROL) 4 mg tablet methylprednisolone 4 mg tablets in a dose pack   TAKE 6 TABLETS ON DAY 1 AS DIRECTED ON PACKAGE AND DECREASE BY 1 TAB EACH DAY FOR A TOTAL OF 6 DAYS      mometasone (NASONEX) 50 mcg/act nasal spray SPRAY 2 SPRAYS INTO EACH NOSTRIL EVERY DAY 1 g 6    omeprazole (PriLOSEC) 40 MG capsule Take 1 capsule (40 mg total) by mouth daily 90 capsule 3    pilocarpine (SALAGEN) 5 mg tablet Take 5 mg by mouth 3 (three) times a day as needed      predniSONE 5 mg tablet Take 4 tabs daily X 5 days then taper to 3 tabs daily X 5 days then taper to 2 tabs daily X 5 days then one tab daily X 5 days then stop      Probiotic Product (PROBIOTIC-10) CAPS Take by mouth      rosuvastatin (CRESTOR) 10 MG tablet Take 1 tablet (10 mg total) by mouth daily 90 tablet 1    secukinumab (Cosentyx Sensoready Pen) 150 mg/mL SOAJ injection Inject 300 mg under the skin every 28 days      secukinumab (Cosentyx Sensoready Pen) 150 mg/mL SOAJ injection Cosentyx Pen 300 mg/2 Pens (150 mg/mL) subcutaneous      sertraline (ZOLOFT) 100 mg tablet Take 1 po daily 90 tablet 3    sulfaSALAzine (AZULFIDINE) 500 mg tablet Take 500 mg by mouth 2 (two) times a day 3 tabs bid        VITAMIN B COMPLEX-C PO vitamin B complex   Take 1 tablet by mouth once daily   Xarelto 20 MG tablet TAKE 1 TABLET BY MOUTH EVERY DAY 90 tablet 3     No current facility-administered medications for this visit  Past Medical History:   Diagnosis Date    Arthritis     CRPS (complex regional pain syndrome)     [facility]) [facility]     GERD (gastroesophageal reflux disease)     H/O total hysterectomy     Inflammatory spondylopathies ([facility] [address] )     Nonmelanoma skin cancer     LAST ASSESSED: [id number]    PE (pulmonary thromboembolism) ([facility])     PONV (postoperative nausea and vomiting)     Squamous cell carcinoma     Urinary incontinence     Uterine leiomyoma     LAST ASSESSED: [id number]     Past Surgical History:   Procedure Laterality Date    ANKLE SURGERY      BACK SURGERY  2018    BELOW KNEE LEG AMPUTATION      LAST ASSESSED: [id number]    BUNIONECTOMY Right      SECTION      HYSTERECTOMY      LAST ASSESSED: [id number]    OTHER SURGICAL HISTORY      SPINAL STEROTAXIS STIMULATION OF CORD; LAST ASSESSED: [id number]    OK COLONOSCOPY FLX DX W/COLLJ SPEC WHEN PFRMD N/A 2018    Procedure: COLONOSCOPY;  Surgeon: Joe Rey MD;  Location: [facility]; Service: Gastroenterology    OK ESOPHAGOGASTRODUODENOSCOPY TRANSORAL DIAGNOSTIC N/A 2018    Procedure: ESOPHAGOGASTRODUODENOSCOPY (EGD); Surgeon: Joe Rey MD;  Location: [facility];   Service: Gastroenterology    OK LAP,RMV  ADNEXAL STRUCTURE N/A 11/10/2017    Procedure: LAPAROSCOPIC REMOVAL OF BILATERAL OVARIES;  LYSIS OF ADHESIONS;  Surgeon: Bethany Medina MD;  Location: [facility];  Service: Gynecology    SINUS SURGERY       Social History     Socioeconomic History    Marital status: Single     Spouse name: Not on file    Number of children: Not on file    Years of education: Not on file    Highest education level: Not on file   Occupational History    Occupation: Retired   Tobacco Use    Smoking status: Never Smoker    Smokeless tobacco: Never Used   Vaping Use    Vaping Use: Never used   Substance and Sexual Activity    Alcohol use: No    Drug use: No    Sexual activity: Yes     Partners: Male   Other Topics Concern    Not on file   Social History Narrative    Not on file     Social Determinants of Health     Financial Resource Strain:     Difficulty of Paying Living Expenses:    Food Insecurity:     Worried About Running Out of Food in the Last Year:     [address] in the Last Year:    Transportation Needs:     Lack of Transportation (Medical):  Lack of Transportation (Non-Medical):    Physical Activity:     Days of Exercise per Week:     Minutes of Exercise per Session:    Stress:     Feeling of Stress :    Social Connections:     Frequency of Communication with Friends and Family:     Frequency of Social Gatherings with Friends and Family:     Attends [employer] Services:     Active Member of Clubs or Organizations:     Attends Club or Organization Meetings:     Marital Status:    Intimate Partner Violence:     Fear of Current or Ex-Partner:     Emotionally Abused:     Physically Abused:     Sexually Abused:      Family History   Problem Relation Age of Onset    Hypertension Mother     Heart disease Mother     Coronary artery disease Mother         Due to calcified coronary lesion     Diabetes Father         Type 2, controlled with neuropathy     Vitiligo Brother     Breast cancer Cousin 28    Breast cancer Other     Vitiligo Daughter             PHYSICAL EXAM:    There were no vitals filed for this visit  General Appearance:   Alert and oriented x 3  Cooperative, and in no respiratory distress   HEENT:   Normocephalic, atraumatic, anicteric      Neck:  Supple, symmetrical, trachea midline   Lungs:   Clear to auscultation bilaterally    Heart[de-identified]   Regular rate and rhythm   Abdomen:   Soft, non-tender, non-distended; normal bowel sounds; no masses, no organomegaly    Genitalia:   Deferred    Rectal:   Deferred    Extremities:  No cyanosis, clubbing or edema    Pulses:  2+ and symmetric all extremities    Skin:  Skin color, texture, turgor normal, no rashes or lesions    Lymph nodes:  No palpable cervical or supraclavicular lymphadenopathy        Lab Results:   Results from last 6 Months   Lab Units 06/25/21  0923   WBC Thousand/uL 6 32   HEMOGLOBIN g/dL 13 1   HEMATOCRIT % 41 9   PLATELETS Thousands/uL 254   NEUTROS PCT % 55   LYMPHS PCT % 31   MONOS PCT % 9   EOS PCT % 4     Results from last 6 Months   Lab Units 06/25/21  0923   POTASSIUM mmol/L 3 9   CHLORIDE mmol/L 105   CO2 mmol/L 28   BUN mg/dL 14   CREATININE mg/dL 0 87   CALCIUM mg/dL 9 3   ALK PHOS U/L 88   ALT U/L 25   AST U/L 16               Imaging Studies: I have personally reviewed pertinent imaging studies  DXA bone density spine hip and pelvis    Result Date: 4/17/2019  Impression: 1  Based on the [facility] classification, this study is normal and the patient is considered at low risk for fracture  2  A daily intake of calcium of at least 1200 mg and vitamin D, 800-1000 IU, as well as weight bearing and muscle strengthening exercise, fall prevention and avoidance of tobacco and excessive alcohol intake as basic preventive measures are recommended  3  Repeat DXA scan on the same equipment in 18-24 months as clinically indicated  The 10 year risk of hip fracture is 0 1%, with the 10 year risk of major osteoporotic fracture being 4%, as calculated by the [facility] fracture risk assessment tool (FRAX)    The current NOF guidelines recommend treating patients with FRAX 10 year risk score of >3% for hip fracture and >20% for major osteoporotic fracture  WHO CLASSIFICATION: Normal (a T-score of -1 0 or higher) Low bone mineral density (a T-score of less than -1 0 but higher than -2 5) Osteoporosis (a T-score of -2 5 or less) Severe osteoporosis (a T-score of -2 5 or less with a fragility fracture)   Workstation performed: [id number]     Mammo screening bilateral w 3d & cad    Result Date: 4/16/2019  Impression: No mammographic evidence of malignancy  ASSESSMENT/BI-RADS CATEGORY: Left: 2 - Benign Right: 2 - Benign Overall: 2 - Benign RECOMMENDATION:      - Routine screening mammogram in 1 year for both breasts  Workstation ID: [id number]       ASSESSMENT and PLAN:      1) History of precancerous polyps - Patient overdue for recall, we will schedule for  Surveillance on large precancerous polyps  All questions answered  We will hold her Xarelto, patient reports her last clot was 5-6 years ago  She has held her Xarelto in the past, and reports that she has never had recurrent clotting  Follow up for surveillance procedure, or as needed

## 2021-10-28 ENCOUNTER — OFFICE VISIT (OUTPATIENT)
Dept: DERMATOLOGY | Facility: CLINIC | Age: 51
End: 2021-10-28
Payer: MEDICARE

## 2021-10-28 VITALS — HEIGHT: 63 IN | WEIGHT: 212 LBS | BODY MASS INDEX: 37.56 KG/M2 | TEMPERATURE: 97.6 F

## 2021-10-28 DIAGNOSIS — D22.9 NEVUS: Primary | ICD-10-CM

## 2021-10-28 DIAGNOSIS — L82.1 SEBORRHEIC KERATOSIS: ICD-10-CM

## 2021-10-28 DIAGNOSIS — C44.320 SCC (SQUAMOUS CELL CARCINOMA), FACE: ICD-10-CM

## 2021-10-28 DIAGNOSIS — L85.3 XEROSIS CUTIS: ICD-10-CM

## 2021-10-28 DIAGNOSIS — D18.01 CHERRY ANGIOMA: ICD-10-CM

## 2021-10-28 DIAGNOSIS — L81.4 LENTIGO: ICD-10-CM

## 2021-10-28 PROCEDURE — 99203 OFFICE O/P NEW LOW 30 MIN: CPT | Performed by: DERMATOLOGY

## 2021-11-01 ENCOUNTER — TELEMEDICINE (OUTPATIENT)
Dept: INTERNAL MEDICINE CLINIC | Facility: CLINIC | Age: 51
End: 2021-11-01
Payer: MEDICARE

## 2021-11-01 DIAGNOSIS — R05.9 COUGH: Primary | ICD-10-CM

## 2021-11-01 PROCEDURE — 99213 OFFICE O/P EST LOW 20 MIN: CPT | Performed by: NURSE PRACTITIONER

## 2021-11-01 RX ORDER — AZITHROMYCIN 250 MG/1
TABLET, FILM COATED ORAL
Qty: 6 TABLET | Refills: 0 | Status: SHIPPED | OUTPATIENT
Start: 2021-11-01 | End: 2021-11-06

## 2021-11-01 RX ORDER — PROMETHAZINE HYDROCHLORIDE AND CODEINE PHOSPHATE 6.25; 1 MG/5ML; MG/5ML
5 SYRUP ORAL EVERY 4 HOURS PRN
Qty: 240 ML | Refills: 2 | Status: SHIPPED | OUTPATIENT
Start: 2021-11-01 | End: 2022-02-14 | Stop reason: CLARIF

## 2021-11-02 ENCOUNTER — TELEPHONE (OUTPATIENT)
Dept: GASTROENTEROLOGY | Facility: HOSPITAL | Age: 51
End: 2021-11-02

## 2021-11-03 ENCOUNTER — HOSPITAL ENCOUNTER (OUTPATIENT)
Dept: GASTROENTEROLOGY | Facility: HOSPITAL | Age: 51
Setting detail: OUTPATIENT SURGERY
Discharge: HOME | End: 2021-11-03
Attending: INTERNAL MEDICINE
Payer: MEDICARE

## 2021-11-03 ENCOUNTER — ANESTHESIA EVENT (OUTPATIENT)
Dept: GASTROENTEROLOGY | Facility: HOSPITAL | Age: 51
End: 2021-11-03

## 2021-11-03 ENCOUNTER — ANESTHESIA (OUTPATIENT)
Dept: GASTROENTEROLOGY | Facility: HOSPITAL | Age: 51
End: 2021-11-03

## 2021-11-03 VITALS
WEIGHT: 207.01 LBS | TEMPERATURE: 99.3 F | DIASTOLIC BLOOD PRESSURE: 61 MMHG | OXYGEN SATURATION: 96 % | BODY MASS INDEX: 36.68 KG/M2 | SYSTOLIC BLOOD PRESSURE: 110 MMHG | HEART RATE: 72 BPM | RESPIRATION RATE: 20 BRPM | HEIGHT: 63 IN

## 2021-11-03 DIAGNOSIS — Z12.11 SCREENING FOR MALIGNANT NEOPLASM OF COLON: ICD-10-CM

## 2021-11-03 DIAGNOSIS — D36.9 TUBULAR ADENOMA: ICD-10-CM

## 2021-11-03 PROCEDURE — 88305 TISSUE EXAM BY PATHOLOGIST: CPT | Performed by: PATHOLOGY

## 2021-11-03 PROCEDURE — 45385 COLONOSCOPY W/LESION REMOVAL: CPT | Performed by: INTERNAL MEDICINE

## 2021-11-03 RX ORDER — SODIUM CHLORIDE, SODIUM LACTATE, POTASSIUM CHLORIDE, CALCIUM CHLORIDE 600; 310; 30; 20 MG/100ML; MG/100ML; MG/100ML; MG/100ML
INJECTION, SOLUTION INTRAVENOUS CONTINUOUS PRN
Status: DISCONTINUED | OUTPATIENT
Start: 2021-11-03 | End: 2021-11-03

## 2021-11-03 RX ORDER — PROPOFOL 10 MG/ML
INJECTION, EMULSION INTRAVENOUS AS NEEDED
Status: DISCONTINUED | OUTPATIENT
Start: 2021-11-03 | End: 2021-11-03

## 2021-11-03 RX ORDER — SODIUM CHLORIDE, SODIUM LACTATE, POTASSIUM CHLORIDE, CALCIUM CHLORIDE 600; 310; 30; 20 MG/100ML; MG/100ML; MG/100ML; MG/100ML
125 INJECTION, SOLUTION INTRAVENOUS CONTINUOUS
Status: DISCONTINUED | OUTPATIENT
Start: 2021-11-03 | End: 2021-11-07 | Stop reason: HOSPADM

## 2021-11-03 RX ORDER — LIDOCAINE HYDROCHLORIDE 10 MG/ML
INJECTION, SOLUTION EPIDURAL; INFILTRATION; INTRACAUDAL; PERINEURAL AS NEEDED
Status: DISCONTINUED | OUTPATIENT
Start: 2021-11-03 | End: 2021-11-03

## 2021-11-03 RX ADMIN — SODIUM CHLORIDE, SODIUM LACTATE, POTASSIUM CHLORIDE, AND CALCIUM CHLORIDE 125 ML/HR: .6; .31; .03; .02 INJECTION, SOLUTION INTRAVENOUS at 10:12

## 2021-11-03 RX ADMIN — PROPOFOL 150 MG: 10 INJECTION, EMULSION INTRAVENOUS at 10:44

## 2021-11-03 RX ADMIN — SODIUM CHLORIDE, SODIUM LACTATE, POTASSIUM CHLORIDE, AND CALCIUM CHLORIDE: .6; .31; .03; .02 INJECTION, SOLUTION INTRAVENOUS at 10:41

## 2021-11-03 RX ADMIN — PROPOFOL 50 MG: 10 INJECTION, EMULSION INTRAVENOUS at 10:47

## 2021-11-03 RX ADMIN — PROPOFOL 50 MG: 10 INJECTION, EMULSION INTRAVENOUS at 10:50

## 2021-11-03 RX ADMIN — LIDOCAINE HYDROCHLORIDE 100 MG: 10 INJECTION, SOLUTION EPIDURAL; INFILTRATION; INTRACAUDAL; PERINEURAL at 10:44

## 2021-11-09 ENCOUNTER — TELEPHONE (OUTPATIENT)
Dept: GASTROENTEROLOGY | Facility: CLINIC | Age: 51
End: 2021-11-09

## 2021-11-15 ENCOUNTER — TELEPHONE (OUTPATIENT)
Dept: DERMATOLOGY | Facility: CLINIC | Age: 51
End: 2021-11-15

## 2021-11-18 ENCOUNTER — TELEPHONE (OUTPATIENT)
Dept: INTERNAL MEDICINE CLINIC | Facility: CLINIC | Age: 51
End: 2021-11-18

## 2021-11-18 NOTE — TELEPHONE ENCOUNTER
Radha Nayla (Self)            [phone]     Pt had a virtual appt w/Nandini    her throat is still very painful, other then that she feels ok    she didn't want to take the coviid test at that time    but needs to see Cassandra Reyes again    she thinks her throat needs to be swabbed    is it ok for her to come in ???

## 2021-11-19 ENCOUNTER — APPOINTMENT (OUTPATIENT)
Dept: LAB | Facility: CLINIC | Age: 51
End: 2021-11-19
Payer: MEDICARE

## 2021-11-19 ENCOUNTER — OFFICE VISIT (OUTPATIENT)
Dept: INTERNAL MEDICINE CLINIC | Facility: CLINIC | Age: 51
End: 2021-11-19
Payer: MEDICARE

## 2021-11-19 VITALS
TEMPERATURE: 97 F | OXYGEN SATURATION: 99 % | SYSTOLIC BLOOD PRESSURE: 118 MMHG | WEIGHT: 214.8 LBS | BODY MASS INDEX: 38.06 KG/M2 | HEIGHT: 63 IN | DIASTOLIC BLOOD PRESSURE: 80 MMHG | HEART RATE: 83 BPM

## 2021-11-19 DIAGNOSIS — J02.9 SORE THROAT: Primary | ICD-10-CM

## 2021-11-19 DIAGNOSIS — E78.5 HYPERLIPIDEMIA, UNSPECIFIED HYPERLIPIDEMIA TYPE: ICD-10-CM

## 2021-11-19 DIAGNOSIS — E78.49 OTHER HYPERLIPIDEMIA: ICD-10-CM

## 2021-11-19 DIAGNOSIS — F11.20 CONTINUOUS OPIOID DEPENDENCE (HCC): ICD-10-CM

## 2021-11-19 DIAGNOSIS — E11.9 TYPE 2 DIABETES MELLITUS WITHOUT COMPLICATION, WITHOUT LONG-TERM CURRENT USE OF INSULIN (HCC): ICD-10-CM

## 2021-11-19 DIAGNOSIS — D50.0 IRON DEFICIENCY ANEMIA DUE TO CHRONIC BLOOD LOSS: ICD-10-CM

## 2021-11-19 DIAGNOSIS — J02.9 SORE THROAT: ICD-10-CM

## 2021-11-19 DIAGNOSIS — Z12.31 BREAST CANCER SCREENING BY MAMMOGRAM: ICD-10-CM

## 2021-11-19 LAB
ALBUMIN SERPL BCP-MCNC: 3.6 G/DL (ref 3.5–5)
ALP SERPL-CCNC: 81 U/L (ref 46–116)
ALT SERPL W P-5'-P-CCNC: 31 U/L (ref 12–78)
ANION GAP SERPL CALCULATED.3IONS-SCNC: 0 MMOL/L (ref 4–13)
AST SERPL W P-5'-P-CCNC: 18 U/L (ref 5–45)
BASOPHILS # BLD AUTO: 0.09 THOUSANDS/ΜL (ref 0–0.1)
BASOPHILS NFR BLD AUTO: 2 % (ref 0–1)
BILIRUB SERPL-MCNC: 0.37 MG/DL (ref 0.2–1)
BUN SERPL-MCNC: 17 MG/DL (ref 5–25)
CALCIUM SERPL-MCNC: 8.9 MG/DL (ref 8.3–10.1)
CHLORIDE SERPL-SCNC: 109 MMOL/L (ref 100–108)
CHOLEST SERPL-MCNC: 240 MG/DL
CO2 SERPL-SCNC: 29 MMOL/L (ref 21–32)
CREAT SERPL-MCNC: 0.77 MG/DL (ref 0.6–1.3)
EOSINOPHIL # BLD AUTO: 0.39 THOUSAND/ΜL (ref 0–0.61)
EOSINOPHIL NFR BLD AUTO: 7 % (ref 0–6)
ERYTHROCYTE [DISTWIDTH] IN BLOOD BY AUTOMATED COUNT: 17.2 % (ref 11.6–15.1)
EST. AVERAGE GLUCOSE BLD GHB EST-MCNC: 126 MG/DL
FERRITIN SERPL-MCNC: 12 NG/ML (ref 8–388)
GFR SERPL CREATININE-BSD FRML MDRD: 88 ML/MIN/1.73SQ M
GLUCOSE P FAST SERPL-MCNC: 88 MG/DL (ref 65–99)
HBA1C MFR BLD: 6 %
HCT VFR BLD AUTO: 41.9 % (ref 34.8–46.1)
HDLC SERPL-MCNC: 55 MG/DL
HGB BLD-MCNC: 12.7 G/DL (ref 11.5–15.4)
IMM GRANULOCYTES # BLD AUTO: 0.01 THOUSAND/UL (ref 0–0.2)
IMM GRANULOCYTES NFR BLD AUTO: 0 % (ref 0–2)
IRON SATN MFR SERPL: 14 % (ref 15–50)
IRON SERPL-MCNC: 43 UG/DL (ref 50–170)
LDLC SERPL CALC-MCNC: 138 MG/DL (ref 0–100)
LYMPHOCYTES # BLD AUTO: 1.91 THOUSANDS/ΜL (ref 0.6–4.47)
LYMPHOCYTES NFR BLD AUTO: 36 % (ref 14–44)
MCH RBC QN AUTO: 26.1 PG (ref 26.8–34.3)
MCHC RBC AUTO-ENTMCNC: 30.3 G/DL (ref 31.4–37.4)
MCV RBC AUTO: 86 FL (ref 82–98)
MONOCYTES # BLD AUTO: 0.47 THOUSAND/ΜL (ref 0.17–1.22)
MONOCYTES NFR BLD AUTO: 9 % (ref 4–12)
NEUTROPHILS # BLD AUTO: 2.46 THOUSANDS/ΜL (ref 1.85–7.62)
NEUTS SEG NFR BLD AUTO: 46 % (ref 43–75)
NONHDLC SERPL-MCNC: 185 MG/DL
NRBC BLD AUTO-RTO: 0 /100 WBCS
PLATELET # BLD AUTO: 269 THOUSANDS/UL (ref 149–390)
PMV BLD AUTO: 11.3 FL (ref 8.9–12.7)
POTASSIUM SERPL-SCNC: 4.3 MMOL/L (ref 3.5–5.3)
PROT SERPL-MCNC: 7.9 G/DL (ref 6.4–8.2)
RBC # BLD AUTO: 4.87 MILLION/UL (ref 3.81–5.12)
SODIUM SERPL-SCNC: 138 MMOL/L (ref 136–145)
TIBC SERPL-MCNC: 313 UG/DL (ref 250–450)
TRIGL SERPL-MCNC: 237 MG/DL
TSH SERPL DL<=0.05 MIU/L-ACNC: 1.97 UIU/ML (ref 0.36–3.74)
WBC # BLD AUTO: 5.33 THOUSAND/UL (ref 4.31–10.16)

## 2021-11-19 PROCEDURE — 83550 IRON BINDING TEST: CPT

## 2021-11-19 PROCEDURE — 87070 CULTURE OTHR SPECIMN AEROBIC: CPT

## 2021-11-19 PROCEDURE — 85025 COMPLETE CBC W/AUTO DIFF WBC: CPT

## 2021-11-19 PROCEDURE — 83036 HEMOGLOBIN GLYCOSYLATED A1C: CPT

## 2021-11-19 PROCEDURE — 84443 ASSAY THYROID STIM HORMONE: CPT

## 2021-11-19 PROCEDURE — 87880 STREP A ASSAY W/OPTIC: CPT | Performed by: NURSE PRACTITIONER

## 2021-11-19 PROCEDURE — 80053 COMPREHEN METABOLIC PANEL: CPT

## 2021-11-19 PROCEDURE — 83540 ASSAY OF IRON: CPT

## 2021-11-19 PROCEDURE — 99214 OFFICE O/P EST MOD 30 MIN: CPT | Performed by: NURSE PRACTITIONER

## 2021-11-19 PROCEDURE — 82728 ASSAY OF FERRITIN: CPT

## 2021-11-19 PROCEDURE — 80061 LIPID PANEL: CPT

## 2021-11-19 PROCEDURE — 36415 COLL VENOUS BLD VENIPUNCTURE: CPT

## 2021-11-19 RX ORDER — PREDNISONE 20 MG/1
TABLET ORAL
Qty: 30 TABLET | Refills: 1 | Status: SHIPPED | OUTPATIENT
Start: 2021-11-19 | End: 2022-02-14 | Stop reason: CLARIF

## 2021-11-21 LAB — BACTERIA THROAT CULT: NORMAL

## 2021-11-22 ENCOUNTER — TELEPHONE (OUTPATIENT)
Dept: INTERNAL MEDICINE CLINIC | Facility: CLINIC | Age: 51
End: 2021-11-22

## 2021-11-28 DIAGNOSIS — J30.9 ALLERGIC RHINITIS, UNSPECIFIED SEASONALITY, UNSPECIFIED TRIGGER: ICD-10-CM

## 2021-11-28 RX ORDER — MOMETASONE FUROATE 50 UG/1
SPRAY, METERED NASAL
Qty: 17 G | Refills: 2 | Status: SHIPPED | OUTPATIENT
Start: 2021-11-28 | End: 2022-07-14

## 2021-12-01 DIAGNOSIS — E78.49 OTHER HYPERLIPIDEMIA: ICD-10-CM

## 2021-12-01 DIAGNOSIS — J30.9 ALLERGIC RHINITIS, UNSPECIFIED SEASONALITY, UNSPECIFIED TRIGGER: ICD-10-CM

## 2021-12-01 RX ORDER — MOMETASONE FUROATE 50 UG/1
SPRAY, METERED NASAL
OUTPATIENT
Start: 2021-12-01 | End: 2021-12-31

## 2021-12-01 RX ORDER — ROSUVASTATIN CALCIUM 10 MG/1
TABLET, COATED ORAL
Qty: 90 TABLET | Refills: 1 | Status: SHIPPED | OUTPATIENT
Start: 2021-12-01 | End: 2022-06-19

## 2022-02-14 ENCOUNTER — OFFICE VISIT (OUTPATIENT)
Dept: INTERNAL MEDICINE CLINIC | Facility: CLINIC | Age: 52
End: 2022-02-14
Payer: MEDICARE

## 2022-02-14 VITALS
SYSTOLIC BLOOD PRESSURE: 130 MMHG | HEIGHT: 63 IN | DIASTOLIC BLOOD PRESSURE: 88 MMHG | BODY MASS INDEX: 38.62 KG/M2 | RESPIRATION RATE: 16 BRPM | HEART RATE: 82 BPM | WEIGHT: 218 LBS | TEMPERATURE: 97 F

## 2022-02-14 DIAGNOSIS — H66.91 RIGHT OTITIS MEDIA, UNSPECIFIED OTITIS MEDIA TYPE: Primary | ICD-10-CM

## 2022-02-14 DIAGNOSIS — I20.8 EXERTIONAL ANGINA (HCC): ICD-10-CM

## 2022-02-14 DIAGNOSIS — D89.89 OTHER SPECIFIED DISORDERS INVOLVING THE IMMUNE MECHANISM, NOT ELSEWHERE CLASSIFIED (HCC): ICD-10-CM

## 2022-02-14 DIAGNOSIS — K51.40 PSEUDOPOLYPOSIS OF COLON WITHOUT COMPLICATION, UNSPECIFIED PART OF COLON (HCC): ICD-10-CM

## 2022-02-14 DIAGNOSIS — E11.628 TYPE 2 DIABETES MELLITUS WITH OTHER SKIN COMPLICATIONS (HCC): ICD-10-CM

## 2022-02-14 DIAGNOSIS — I26.99 OTHER PULMONARY EMBOLISM WITHOUT ACUTE COR PULMONALE, UNSPECIFIED CHRONICITY (HCC): ICD-10-CM

## 2022-02-14 DIAGNOSIS — M45.9 ANKYLOSING SPONDYLITIS, UNSPECIFIED SITE OF SPINE (HCC): ICD-10-CM

## 2022-02-14 DIAGNOSIS — F33.9 EPISODE OF RECURRENT MAJOR DEPRESSIVE DISORDER, UNSPECIFIED DEPRESSION EPISODE SEVERITY (HCC): ICD-10-CM

## 2022-02-14 DIAGNOSIS — E66.01 OBESITY, MORBID (HCC): ICD-10-CM

## 2022-02-14 DIAGNOSIS — F11.20 CONTINUOUS OPIOID DEPENDENCE (HCC): ICD-10-CM

## 2022-02-14 PROCEDURE — 99214 OFFICE O/P EST MOD 30 MIN: CPT | Performed by: NURSE PRACTITIONER

## 2022-02-14 RX ORDER — CEVIMELINE HYDROCHLORIDE 30 MG/1
30 CAPSULE ORAL 3 TIMES DAILY
COMMUNITY
Start: 2022-01-19

## 2022-02-14 RX ORDER — AZITHROMYCIN 250 MG/1
TABLET, FILM COATED ORAL
Qty: 6 TABLET | Refills: 0 | Status: SHIPPED | OUTPATIENT
Start: 2022-02-14 | End: 2022-02-19

## 2022-02-14 NOTE — PROGRESS NOTES
Assessment/Plan:    Patient Instructions   Otitis externa/media treat w/ zpak and external drops- probiotics, contact me if not improving         Diagnoses and all orders for this visit:    Right otitis media, unspecified otitis media type  -     neomycin-polymyxin-hydrocortisone (CORTISPORIN) otic solution; Administer 3 drops to the right ear every 8 (eight) hours  -     azithromycin (ZITHROMAX) 250 mg tablet; Take 2 tabs today then 1 tab each additional day until complete    Other specified disorders involving the immune mechanism, not elsewhere classified ([facility] [address] )    Ankylosing spondylitis, unspecified site of spine ([facility] [address] )    Other pulmonary embolism without acute cor pulmonale, unspecified chronicity ([facility])    Continuous opioid dependence ([facility] [address] )    Episode of recurrent major depressive disorder, unspecified depression episode severity ([facility] [address] )    Pseudopolyposis of colon without complication, unspecified part of colon ([facility] [address] )    Obesity, morbid ([facility] [address] )    Exertional angina ([facility] [address] )    Type 2 diabetes mellitus with other skin complications ([facility] [address] )    Other orders  -     cevimeline (EVOXAC) 30 MG capsule;  Take 30 mg by mouth 3 (three) times a day         Subjective:      Patient ID: Aurelio Luna is a 46 y o  female    Pt noticed ear fullness yesterday then woke up this am w/ right ear pain allergies and sinuses have been bothering her        Current Outpatient Medications:     b complex vitamins capsule, Take 1 capsule by mouth daily, Disp: , Rfl:     buPROPion (WELLBUTRIN XL) 300 mg 24 hr tablet, Take 1 tablet (300 mg total) by mouth every morning, Disp: 90 tablet, Rfl: 3    calcium carbonate (OS-APRYL) 600 MG tablet, Take 600 mg by mouth 2 (two) times a day with meals, Disp: , Rfl:     cevimeline (EVOXAC) 30 MG capsule, Take 30 mg by mouth 3 (three) times a day, Disp: , Rfl:     Cholecalciferol (VITAMIN D3) 5000 units CAPS, Take by mouth, Disp: , Rfl:     desonide (DESOWEN) 0 05 % cream, Apply topically 2 (two) times a day (Patient taking differently: Apply topically as needed ), Disp: 60 g, Rfl: 3    ferrous sulfate 324 (65 Fe) mg, Take 1 tablet (324 mg total) by mouth every other day, Disp: , Rfl:     folic acid (FOLVITE) 1 mg tablet, Take 3 tablets by mouth daily  , Disp: , Rfl:     glucosamine-chondroitin 500-400 MG tablet, Take 1 tablet by mouth 3 (three) times a day, Disp: , Rfl:     HYDROcodone-acetaminophen (NORCO) 5-325 mg per tablet, Take 1 tablet by mouth every 8 (eight) hours as needed, Disp: , Rfl:     inFLIXimab (REMICADE IV), Infuse into a venous catheter Every 6 weeks  , Disp: , Rfl:     LORazepam (ATIVAN) 0 5 mg tablet, Take 1 tablet (0 5 mg total) by mouth daily at bedtime (Patient taking differently: Take 0 5 mg by mouth as needed  ), Disp: 30 tablet, Rfl: 0    methotrexate 2 5 mg tablet, Take 2 5 mg by mouth once a week 8 pills weekly, Disp: , Rfl:     mometasone (NASONEX) 50 mcg/act nasal spray, SPRAY 2 SPRAYS INTO EACH NOSTRIL EVERY DAY, Disp: 17 g, Rfl: 2    omeprazole (PriLOSEC) 40 MG capsule, Take 1 capsule (40 mg total) by mouth daily, Disp: 90 capsule, Rfl: 3    Probiotic Product (PROBIOTIC-10) CAPS, Take by mouth, Disp: , Rfl:     rosuvastatin (CRESTOR) 10 MG tablet, TAKE 1 TABLET BY MOUTH EVERY DAY, Disp: 90 tablet, Rfl: 1    sertraline (ZOLOFT) 100 mg tablet, Take 1 po daily, Disp: 90 tablet, Rfl: 3    VITAMIN B COMPLEX-C PO, vitamin B complex  Take 1 tablet by mouth once daily  , Disp: , Rfl:     Xarelto 20 MG tablet, TAKE 1 TABLET BY MOUTH EVERY DAY, Disp: 90 tablet, Rfl: 3    azithromycin (ZITHROMAX) 250 mg tablet, Take 2 tabs today then 1 tab each additional day until complete, Disp: 6 tablet, Rfl: 0    neomycin-polymyxin-hydrocortisone (CORTISPORIN) otic solution, Administer 3 drops to the right ear every 8 (eight) hours, Disp: 10 mL, Rfl: 0    No results found for this or any previous visit (from the past 1008 hour(s))      The following portions of the patient's history were reviewed and updated as appropriate: allergies, current medications, past family history, past medical history, past social history, past surgical history and problem list      Review of Systems   Constitutional: Negative for appetite change, chills, diaphoresis, fatigue, fever and unexpected weight change  HENT: Positive for ear pain  Negative for postnasal drip and sneezing  Eyes: Negative for visual disturbance  Respiratory: Negative for chest tightness and shortness of breath  Cardiovascular: Negative for chest pain, palpitations and leg swelling  Gastrointestinal: Negative for abdominal pain and blood in stool  Endocrine: Negative for cold intolerance, heat intolerance, polydipsia, polyphagia and polyuria  Genitourinary: Negative for difficulty urinating, dysuria, frequency and urgency  Musculoskeletal: Negative for arthralgias and myalgias  Skin: Negative for rash and wound  Neurological: Negative for dizziness, weakness, light-headedness and headaches  Hematological: Negative for adenopathy  Psychiatric/Behavioral: Negative for confusion, dysphoric mood and sleep disturbance  The patient is not nervous/anxious  Objective:      /88 (BP Location: Left arm, Patient Position: Sitting, Cuff Size: Standard)   Pulse 82   Temp (!) 97 °F (36 1 °C) (Tympanic Core)   Resp 16   Ht 5' 3" (1 6 m)   Wt 98 9 kg (218 lb)   BMI 38 62 kg/m²        Physical Exam  Constitutional:       General: She is not in acute distress  Appearance: She is well-developed  She is not diaphoretic  HENT:      Head: Normocephalic and atraumatic  Right Ear: Hearing normal  Tympanic membrane is injected and bulging  Nose: Nose normal    Eyes:      Conjunctiva/sclera: Conjunctivae normal       Pupils: Pupils are equal, round, and reactive to light  Neck:      Thyroid: No thyromegaly  Vascular: No JVD  Trachea: No tracheal deviation     Cardiovascular:      Rate and Rhythm: Normal rate and regular rhythm  Heart sounds: Normal heart sounds  No murmur heard  No friction rub  No gallop  Pulmonary:      Effort: Pulmonary effort is normal  No respiratory distress  Breath sounds: Normal breath sounds  No wheezing or rales  Abdominal:      General: Bowel sounds are normal  There is no distension  Palpations: Abdomen is soft  Tenderness: There is no abdominal tenderness  Musculoskeletal:         General: Normal range of motion  Cervical back: Normal range of motion and neck supple  Lymphadenopathy:      Cervical: No cervical adenopathy  Skin:     General: Skin is warm and dry  Findings: No rash  Neurological:      Mental Status: She is alert and oriented to person, place, and time  Cranial Nerves: No cranial nerve deficit  Psychiatric:         Behavior: Behavior normal          Thought Content:  Thought content normal          Judgment: Judgment normal

## 2022-06-19 DIAGNOSIS — O22.30 DVT (DEEP VEIN THROMBOSIS) IN PREGNANCY: ICD-10-CM

## 2022-06-19 DIAGNOSIS — E78.49 OTHER HYPERLIPIDEMIA: ICD-10-CM

## 2022-06-19 RX ORDER — ROSUVASTATIN CALCIUM 10 MG/1
TABLET, COATED ORAL
Qty: 90 TABLET | Refills: 1 | Status: SHIPPED | OUTPATIENT
Start: 2022-06-19

## 2022-06-19 RX ORDER — RIVAROXABAN 20 MG/1
TABLET, FILM COATED ORAL
Qty: 90 TABLET | Refills: 3 | Status: SHIPPED | OUTPATIENT
Start: 2022-06-19

## 2022-06-23 DIAGNOSIS — F41.9 ANXIETY: ICD-10-CM

## 2022-06-23 DIAGNOSIS — K21.9 GASTROESOPHAGEAL REFLUX DISEASE WITHOUT ESOPHAGITIS: ICD-10-CM

## 2022-06-24 RX ORDER — LORAZEPAM 0.5 MG/1
0.5 TABLET ORAL
Qty: 30 TABLET | Refills: 0 | Status: SHIPPED | OUTPATIENT
Start: 2022-06-24

## 2022-06-24 RX ORDER — OMEPRAZOLE 40 MG/1
40 CAPSULE, DELAYED RELEASE ORAL DAILY
Qty: 90 CAPSULE | Refills: 0 | Status: SHIPPED | OUTPATIENT
Start: 2022-06-24

## 2022-07-06 DIAGNOSIS — J01.90 ACUTE SINUSITIS, RECURRENCE NOT SPECIFIED, UNSPECIFIED LOCATION: Primary | ICD-10-CM

## 2022-07-06 DIAGNOSIS — F32.A DEPRESSION, UNSPECIFIED DEPRESSION TYPE: ICD-10-CM

## 2022-07-06 RX ORDER — SERTRALINE HYDROCHLORIDE 100 MG/1
TABLET, FILM COATED ORAL
Qty: 90 TABLET | Refills: 3 | Status: SHIPPED | OUTPATIENT
Start: 2022-07-06

## 2022-07-06 RX ORDER — BUPROPION HYDROCHLORIDE 300 MG/1
TABLET ORAL
Qty: 90 TABLET | Refills: 3 | Status: SHIPPED | OUTPATIENT
Start: 2022-07-06

## 2022-07-06 RX ORDER — AZITHROMYCIN 250 MG/1
TABLET, FILM COATED ORAL
Qty: 6 TABLET | Refills: 0 | Status: SHIPPED | OUTPATIENT
Start: 2022-07-06 | End: 2022-07-11

## 2022-07-14 ENCOUNTER — APPOINTMENT (OUTPATIENT)
Dept: LAB | Facility: CLINIC | Age: 52
End: 2022-07-14
Payer: MEDICARE

## 2022-07-14 ENCOUNTER — OFFICE VISIT (OUTPATIENT)
Dept: INTERNAL MEDICINE CLINIC | Facility: CLINIC | Age: 52
End: 2022-07-14
Payer: MEDICARE

## 2022-07-14 VITALS
SYSTOLIC BLOOD PRESSURE: 128 MMHG | OXYGEN SATURATION: 99 % | WEIGHT: 217.9 LBS | HEIGHT: 63 IN | HEART RATE: 88 BPM | BODY MASS INDEX: 38.61 KG/M2 | DIASTOLIC BLOOD PRESSURE: 90 MMHG | RESPIRATION RATE: 18 BRPM | TEMPERATURE: 98.1 F

## 2022-07-14 DIAGNOSIS — H66.90 ACUTE OTITIS MEDIA, UNSPECIFIED OTITIS MEDIA TYPE: ICD-10-CM

## 2022-07-14 DIAGNOSIS — J02.9 THROAT INFECTION: ICD-10-CM

## 2022-07-14 DIAGNOSIS — E78.5 HYPERLIPIDEMIA, UNSPECIFIED HYPERLIPIDEMIA TYPE: ICD-10-CM

## 2022-07-14 DIAGNOSIS — Z23 ENCOUNTER FOR IMMUNIZATION: ICD-10-CM

## 2022-07-14 DIAGNOSIS — Z12.31 ENCOUNTER FOR SCREENING MAMMOGRAM FOR BREAST CANCER: ICD-10-CM

## 2022-07-14 DIAGNOSIS — E11.9 TYPE 2 DIABETES MELLITUS WITHOUT COMPLICATION, WITHOUT LONG-TERM CURRENT USE OF INSULIN (HCC): Primary | ICD-10-CM

## 2022-07-14 LAB — S PYO AG THROAT QL: POSITIVE

## 2022-07-14 PROCEDURE — 87070 CULTURE OTHR SPECIMN AEROBIC: CPT

## 2022-07-14 PROCEDURE — 87880 STREP A ASSAY W/OPTIC: CPT | Performed by: NURSE PRACTITIONER

## 2022-07-14 PROCEDURE — 99214 OFFICE O/P EST MOD 30 MIN: CPT | Performed by: NURSE PRACTITIONER

## 2022-07-14 PROCEDURE — 87147 CULTURE TYPE IMMUNOLOGIC: CPT

## 2022-07-14 RX ORDER — PREDNISONE 10 MG/1
TABLET ORAL
Qty: 18 TABLET | Refills: 0 | Status: SHIPPED | OUTPATIENT
Start: 2022-07-14

## 2022-07-14 NOTE — PROGRESS NOTES
INTERNAL MEDICINE FOLLOW-UP VISIT  [facility] - [facility] [address] [place] [address]    NAME: Katie Gallo  AGE: 46 y o  SEX: female  : 1970     DATE: 2022     Assessment and Plan:   1  Encounter for immunization    2  Encounter for screening mammogram for breast cancer  - Mammo screening bilateral w 3d & cad; Future    3  Pharyngitis  ? Related Humira in office strep was negative  Sent culture to labs  - predniSONE 10 mg tablet; Take 3 tablets for 3 days then 2 tablets for 3 days then 1 tablet for 3 days  Dispense: 18 tablet; Refill: 0    4  Type 2 diabetes mellitus without complication, without long-term current use of insulin (HCC  - Hemoglobin A1C; Future  - Microalbumin / creatinine urine ratio    5  Hyperlipidemia, unspecified hyperlipidemia type  - CBC and differential; Future  - Comprehensive metabolic panel; Future  - Lipid panel; Future  - TSH, 3rd generation with Free T4 reflex; Future    6  Throat infection  - POCT rapid strepA  - Throat culture; Future    BMI Counseling: Body mass index is 38 6 kg/m²  The BMI is above normal  Nutrition recommendations include decreasing portion sizes and consuming healthier snacks  Exercise recommendations include exercising 3-5 times per week  No pharmacotherapy was ordered  Rationale for BMI follow-up plan is due to patient being overweight or obese  No follow-ups on file         Chief Complaint:     Chief Complaint   Patient presents with    Sore Throat    Cough      History of Present Illness:     Has not been feeling well for over a week  Had zpak still w/ sore throat  On humira it is a side effects  Refuses to test for covid    The following portions of the patient's history were reviewed and updated as appropriate: allergies, current medications, past family history, past medical history, past social history, past surgical history and problem list      Review of Systems:     Review of Systems   Constitutional: Negative for appetite change, chills, diaphoresis, fatigue, fever and unexpected weight change  HENT: Positive for sore throat  Negative for postnasal drip and sneezing  Eyes: Negative for visual disturbance  Respiratory: Negative for chest tightness and shortness of breath  Cardiovascular: Negative for chest pain, palpitations and leg swelling  Gastrointestinal: Negative for abdominal pain and blood in stool  Endocrine: Negative for cold intolerance, heat intolerance, polydipsia, polyphagia and polyuria  Genitourinary: Negative for difficulty urinating, dysuria, frequency and urgency  Musculoskeletal: Negative for arthralgias and myalgias  Skin: Negative for rash and wound  Neurological: Negative for dizziness, weakness, light-headedness and headaches  Hematological: Negative for adenopathy  Psychiatric/Behavioral: Negative for confusion, dysphoric mood and sleep disturbance  The patient is not nervous/anxious           Past Medical History:     Past Medical History:   Diagnosis Date    Arthritis     Colon polyp     CRPS (complex regional pain syndrome)     DVT [facility]deep venous thrombosis) ([facility])     GERD (gastroesophageal reflux disease)     H/O total hysterectomy     Inflammatory spondylopathies ([facility])     Nonmelanoma skin cancer     LAST ASSESSED: [id number]    PE (pulmonary thromboembolism) ([facility])     PONV (postoperative nausea and vomiting)     Squamous cell carcinoma     Squamous cell skin cancer 2020    nose     Urinary incontinence     Uterine leiomyoma     LAST ASSESSED: [id number]        Current Medications:     Current Outpatient Medications:     b complex vitamins capsule, Take 1 capsule by mouth daily, Disp: , Rfl:     buPROPion (WELLBUTRIN XL) 300 mg 24 hr tablet, TAKE 1 TABLET BY MOUTH EVERY DAY IN THE MORNING, Disp: 90 tablet, Rfl: 3    calcium carbonate (OS-APRYL) 600 MG tablet, Take 600 mg by mouth 2 (two) times a day with meals, Disp: , Rfl:     cevimeline (EVOXAC) 30 MG capsule, Take 30 mg by mouth 3 (three) times a day, Disp: , Rfl:     Cholecalciferol (VITAMIN D3) 5000 units CAPS, Take by mouth, Disp: , Rfl:     desonide (DESOWEN) 0 05 % cream, Apply topically 2 (two) times a day (Patient taking differently: Apply topically as needed), Disp: 60 g, Rfl: 3    ferrous sulfate 324 (65 Fe) mg, Take 1 tablet (324 mg total) by mouth every other day, Disp: , Rfl:     folic acid (FOLVITE) 1 mg tablet, Take 3 tablets by mouth daily  , Disp: , Rfl:     glucosamine-chondroitin 500-400 MG tablet, Take 1 tablet by mouth 3 (three) times a day, Disp: , Rfl:     HYDROcodone-acetaminophen (NORCO) 5-325 mg per tablet, Take 1 tablet by mouth every 8 (eight) hours as needed, Disp: , Rfl:     inFLIXimab (REMICADE IV), Infuse into a venous catheter Every 6 weeks  , Disp: , Rfl:     LORazepam (ATIVAN) 0 5 mg tablet, Take 1 tablet (0 5 mg total) by mouth daily at bedtime, Disp: 30 tablet, Rfl: 0    methotrexate 2 5 mg tablet, Take 2 5 mg by mouth once a week 8 pills weekly, Disp: , Rfl:     mometasone (NASONEX) 50 mcg/act nasal spray, SPRAY 2 SPRAYS INTO EACH NOSTRIL EVERY DAY, Disp: 17 g, Rfl: 2    neomycin-polymyxin-hydrocortisone (CORTISPORIN) otic solution, Administer 3 drops to the right ear every 8 (eight) hours, Disp: 10 mL, Rfl: 0    omeprazole (PriLOSEC) 40 MG capsule, Take 1 capsule (40 mg total) by mouth daily, Disp: 90 capsule, Rfl: 0    predniSONE 10 mg tablet, Take 3 tablets for 3 days then 2 tablets for 3 days then 1 tablet for 3 days, Disp: 18 tablet, Rfl: 0    Probiotic Product (PROBIOTIC-10) CAPS, Take by mouth, Disp: , Rfl:     rosuvastatin (CRESTOR) 10 MG tablet, TAKE 1 TABLET BY MOUTH EVERY DAY, Disp: 90 tablet, Rfl: 1    sertraline (ZOLOFT) 100 mg tablet, TAKE 1 TABLET BY MOUTH EVERY DAY, Disp: 90 tablet, Rfl: 3    VITAMIN B COMPLEX-C PO, vitamin B complex  Take 1 tablet by mouth once daily  , Disp: , Rfl:     Xarelto 20 MG tablet, TAKE 1 TABLET BY MOUTH EVERY DAY, Disp: 90 tablet, Rfl: 3     Allergies: Allergies   Allergen Reactions    Leflunomide Shortness Of Breath and Palpitations    Penicillins Hives    Ciprofloxacin Rash    Codeine Itching and Rash    Morphine Itching and Rash        Physical Exam:     /90 (BP Location: Left arm, Patient Position: Sitting, Cuff Size: Standard)   Pulse 88   Temp 98 1 °F (36 7 °C) (Oral)   Resp 18   Ht 5' 3" (1 6 m)   Wt 98 8 kg (217 lb 14 4 oz)   SpO2 99%   BMI 38 60 kg/m²     Physical Exam  Constitutional:       Appearance: She is well-developed  HENT:      Head: Normocephalic and atraumatic  Nose: Congestion present  Mouth/Throat:      Pharynx: Pharyngeal swelling and posterior oropharyngeal erythema present  Eyes:      Pupils: Pupils are equal, round, and reactive to light  Neck:      Thyroid: No thyromegaly  Cardiovascular:      Rate and Rhythm: Normal rate and regular rhythm  Heart sounds: No murmur heard  Pulmonary:      Effort: Pulmonary effort is normal       Breath sounds: Normal breath sounds  Abdominal:      General: Bowel sounds are normal       Palpations: Abdomen is soft  Musculoskeletal:         General: Normal range of motion  Cervical back: Normal range of motion and neck supple  Lymphadenopathy:      Cervical: No cervical adenopathy  Skin:     General: Skin is warm and dry  Neurological:      Mental Status: She is alert and oriented to person, place, and time  Data:     Laboratory Results: I have personally reviewed the pertinent laboratory results/reports   Radiology/Other Diagnostic Testing Results: I have personally reviewed pertinent reports        BRAYDEN Gutierres  [facility] [address] [place] [address]

## 2022-07-15 ENCOUNTER — TELEPHONE (OUTPATIENT)
Dept: INTERNAL MEDICINE CLINIC | Facility: CLINIC | Age: 52
End: 2022-07-15

## 2022-07-18 ENCOUNTER — HOSPITAL ENCOUNTER (EMERGENCY)
Facility: HOSPITAL | Age: 52
Discharge: HOME | End: 2022-07-18
Attending: EMERGENCY MEDICINE
Payer: MEDICARE

## 2022-07-18 ENCOUNTER — APPOINTMENT (EMERGENCY)
Dept: RADIOLOGY | Facility: HOSPITAL | Age: 52
End: 2022-07-18
Payer: MEDICARE

## 2022-07-18 VITALS
HEART RATE: 96 BPM | OXYGEN SATURATION: 98 % | DIASTOLIC BLOOD PRESSURE: 90 MMHG | RESPIRATION RATE: 18 BRPM | SYSTOLIC BLOOD PRESSURE: 130 MMHG | TEMPERATURE: 98.3 F

## 2022-07-18 DIAGNOSIS — S91.331A PUNCTURE WOUND OF RIGHT FOOT, INITIAL ENCOUNTER: Primary | ICD-10-CM

## 2022-07-18 LAB — BACTERIA THROAT CULT: ABNORMAL

## 2022-07-18 PROCEDURE — 99282 EMERGENCY DEPT VISIT SF MDM: CPT

## 2022-07-18 PROCEDURE — 90471 IMMUNIZATION ADMIN: CPT

## 2022-07-18 PROCEDURE — 90715 TDAP VACCINE 7 YRS/> IM: CPT | Performed by: EMERGENCY MEDICINE

## 2022-07-18 PROCEDURE — 99284 EMERGENCY DEPT VISIT MOD MDM: CPT | Performed by: EMERGENCY MEDICINE

## 2022-07-18 PROCEDURE — 73630 X-RAY EXAM OF FOOT: CPT

## 2022-07-18 RX ORDER — SULFAMETHOXAZOLE AND TRIMETHOPRIM 800; 160 MG/1; MG/1
1 TABLET ORAL ONCE
Status: COMPLETED | OUTPATIENT
Start: 2022-07-18 | End: 2022-07-18

## 2022-07-18 RX ORDER — CEPHALEXIN 500 MG/1
500 CAPSULE ORAL 3 TIMES DAILY
Qty: 20 CAPSULE | Refills: 0 | Status: SHIPPED | OUTPATIENT
Start: 2022-07-18 | End: 2022-07-25

## 2022-07-18 RX ORDER — CEPHALEXIN 250 MG/1
500 CAPSULE ORAL ONCE
Status: COMPLETED | OUTPATIENT
Start: 2022-07-18 | End: 2022-07-18

## 2022-07-18 RX ORDER — SULFAMETHOXAZOLE AND TRIMETHOPRIM 800; 160 MG/1; MG/1
1 TABLET ORAL 2 TIMES DAILY
Qty: 13 TABLET | Refills: 0 | Status: SHIPPED | OUTPATIENT
Start: 2022-07-18 | End: 2022-07-25

## 2022-07-18 RX ADMIN — SULFAMETHOXAZOLE AND TRIMETHOPRIM 1 TABLET: 800; 160 TABLET ORAL at 16:34

## 2022-07-18 RX ADMIN — TETANUS TOXOID, REDUCED DIPHTHERIA TOXOID AND ACELLULAR PERTUSSIS VACCINE, ADSORBED 0.5 ML: 5; 2.5; 8; 8; 2.5 SUSPENSION INTRAMUSCULAR at 16:13

## 2022-07-18 RX ADMIN — CEPHALEXIN 500 MG: 250 CAPSULE ORAL at 16:34

## 2022-07-18 NOTE — DISCHARGE INSTRUCTIONS
Keep the wound clean with soap and water  Cover it with topical antibiotic ointment and a bandage when you are doing anything where might dirty  Take the antibiotics as prescribed  Follow-up with your primary care doctor or podiatrist for further evaluation and monitoring of the wound  Return if you develop severe pain, drainage, streaking redness, fevers, or for any other concerns

## 2022-07-18 NOTE — ED PROVIDER NOTES
History  Chief Complaint   Patient presents with    Puncture Wound     Pt reports stepping on a nail last night on right foot  Needs tetanus  HPI    Prior to Admission Medications   Prescriptions Last Dose Informant Patient Reported? Taking? Cholecalciferol (VITAMIN D3) 5000 units CAPS  Self Yes No   Sig: Take by mouth   HYDROcodone-acetaminophen (NORCO) 5-325 mg per tablet  Self Yes No   Sig: Take 1 tablet by mouth every 8 (eight) hours as needed   LORazepam (ATIVAN) 0 5 mg tablet   No No   Sig: Take 1 tablet (0 5 mg total) by mouth daily at bedtime   Probiotic Product (PROBIOTIC-10) CAPS  Self Yes No   Sig: Take by mouth   VITAMIN B COMPLEX-C PO  Self Yes No   Sig: vitamin B complex   Take 1 tablet by mouth once daily  Xarelto 20 MG tablet   No No   Sig: TAKE 1 TABLET BY MOUTH EVERY DAY   b complex vitamins capsule  Self Yes No   Sig: Take 1 capsule by mouth daily   buPROPion (WELLBUTRIN XL) 300 mg 24 hr tablet   No No   Sig: TAKE 1 TABLET BY MOUTH EVERY DAY IN THE MORNING   calcium carbonate (OS-APRYL) 600 MG tablet  Self Yes No   Sig: Take 600 mg by mouth 2 (two) times a day with meals   cevimeline (EVOXAC) 30 MG capsule  Self Yes No   Sig: Take 30 mg by mouth 3 (three) times a day   desonide (DESOWEN) 0 05 % cream  Self No No   Sig: Apply topically 2 (two) times a day   Patient taking differently: Apply topically as needed   ferrous sulfate 324 (65 Fe) mg  Self No No   Sig: Take 1 tablet (324 mg total) by mouth every other day   folic acid (FOLVITE) 1 mg tablet  Self Yes No   Sig: Take 3 tablets by mouth daily     glucosamine-chondroitin 500-400 MG tablet  Self Yes No   Sig: Take 1 tablet by mouth 3 (three) times a day   inFLIXimab (REMICADE IV)  Self Yes No   Sig: Infuse into a venous catheter Every 6 weeks     methotrexate 2 5 mg tablet  Self Yes No   Sig: Take 2 5 mg by mouth once a week 8 pills weekly   mometasone (NASONEX) 50 mcg/act nasal spray  Self No No   Sig: SPRAY 2 SPRAYS INTO EACH NOSTRIL EVERY DAY   neomycin-polymyxin-hydrocortisone (CORTISPORIN) otic solution   No No   Sig: Administer 3 drops to the right ear every 8 (eight) hours   omeprazole (PriLOSEC) 40 MG capsule   No No   Sig: Take 1 capsule (40 mg total) by mouth daily   predniSONE 10 mg tablet   No No   Sig: Take 3 tablets for 3 days then 2 tablets for 3 days then 1 tablet for 3 days   rosuvastatin (CRESTOR) 10 MG tablet   No No   Sig: TAKE 1 TABLET BY MOUTH EVERY DAY   sertraline (ZOLOFT) 100 mg tablet   No No   Sig: TAKE 1 TABLET BY MOUTH EVERY DAY      Facility-Administered Medications: None       Past Medical History:   Diagnosis Date    Arthritis     Colon polyp     CRPS (complex regional pain syndrome)     DVT (deep venous thrombosis) (HCC)     GERD (gastroesophageal reflux disease)     H/O total hysterectomy     Inflammatory spondylopathies (HCC)     Nonmelanoma skin cancer     LAST ASSESSED: [id number]    PE (pulmonary thromboembolism) (HCC)     PONV (postoperative nausea and vomiting)     Squamous cell carcinoma     Squamous cell skin cancer     nose     Urinary incontinence     Uterine leiomyoma     LAST ASSESSED: [id number]       Past Surgical History:   Procedure Laterality Date    ANKLE SURGERY      BACK SURGERY  2018    BELOW KNEE LEG AMPUTATION      LAST ASSESSED: [id number]    BUNIONECTOMY Right      SECTION      HYSTERECTOMY      LAST ASSESSED: [id number]    OTHER SURGICAL HISTORY      SPINAL STEROTAXIS STIMULATION OF CORD; LAST ASSESSED: [id number]    NM COLONOSCOPY FLX DX W/COLLJ SPEC WHEN PFRMD N/A 2018    Procedure: COLONOSCOPY;  Surgeon: Ronnie German MD;  Location: [facility]; Service: Gastroenterology    NM ESOPHAGOGASTRODUODENOSCOPY TRANSORAL DIAGNOSTIC N/A 2018    Procedure: ESOPHAGOGASTRODUODENOSCOPY (EGD); Surgeon: Ronnie German MD;  Location: [facility];   Service: Gastroenterology    NM LAP,RMV  ADNEXAL STRUCTURE N/A 11/10/2017    Procedure: LAPAROSCOPIC REMOVAL OF BILATERAL OVARIES;  LYSIS OF ADHESIONS;  Surgeon: Brendon King MD;  Location: [facility];  Service: Gynecology    SINUS SURGERY         Family History   Problem Relation Age of Onset    Hypertension Mother     Heart disease Mother     Coronary artery disease Mother         Due to calcified coronary lesion     Diabetes Father         Type 2, controlled with neuropathy     Vitiligo Brother     Breast cancer Cousin 28    Breast cancer Other     Vitiligo Daughter      I have reviewed and agree with the history as documented  E-Cigarette/Vaping    E-Cigarette Use Never User      E-Cigarette/Vaping Substances    Nicotine No     THC No     CBD No     Flavoring No     Other No     Unknown No      Social History     Tobacco Use    Smoking status: Never Smoker    Smokeless tobacco: Never Used   Vaping Use    Vaping Use: Never used   Substance Use Topics    Alcohol use: No    Drug use: No       Review of Systems    Physical Exam  Physical Exam  Vitals and nursing note reviewed  Constitutional:       General: She is not in acute distress  Appearance: She is well-developed  HENT:      Head: Normocephalic and atraumatic  Eyes:      Conjunctiva/sclera: Conjunctivae normal       Pupils: Pupils are equal, round, and reactive to light  Neck:      Trachea: No tracheal deviation  Cardiovascular:      Rate and Rhythm: Normal rate and regular rhythm  Pulmonary:      Effort: Pulmonary effort is normal  No respiratory distress  Musculoskeletal:      Cervical back: Normal range of motion  Right foot: Normal capillary refill  Tenderness (mild, around puncture) present  No swelling, bony tenderness or crepitus  Legs:         Feet:    Skin:     General: Skin is warm and dry  Neurological:      Mental Status: She is alert and oriented to person, place, and time  GCS: GCS eye subscore is 4  GCS verbal subscore is 5  GCS motor subscore is 6  Psychiatric:         Behavior: Behavior normal          Vital Signs  ED Triage Vitals [07/18/22 1413]   Temperature Pulse Respirations Blood Pressure SpO2   98 3 °F (36 8 °C) 96 18 130/90 98 %      Temp Source Heart Rate Source Patient Position - Orthostatic VS BP Location FiO2 (%)   Oral Monitor -- Left arm --      Pain Score       5           Vitals:    07/18/22 1413   BP: 130/90   Pulse: 96         Visual Acuity      ED Medications  Medications   cephalexin (KEFLEX) capsule 500 mg (has no administration in time range)   sulfamethoxazole-trimethoprim (BACTRIM DS) 800-160 mg per tablet 1 tablet (has no administration in time range)   tetanus-diphtheria-acellular pertussis (BOOSTRIX) IM injection 0 5 mL (0 5 mL Intramuscular Given 7/18/22 1613)       Diagnostic Studies  Results Reviewed     None                 XR foot 3+ views RIGHT    (Results Pending)              Procedures  Procedures         ED Course                       MDM  Number of Diagnoses or Management Options  Puncture wound of right foot, initial encounter: new and requires workup  Diagnosis management comments: This is a 57-year-old female who presents here today with a puncture wound to her right foot  She says yesterday she [place] walking [place] the barn when she stepped on a nail that went through her boot into her foot  She does not think it went in very deep as she pulled her foot back quickly, however says due to unsteady gait from prosthesis of her left leg, she uses her right leg more for balance so steps down hard    She said she did talk with her doctor's office yesterday and was advised to come in today for updated tetanus shot and evaluation, however received a call this morning that she should come to the ER for evaluation and possible tetanus immunoglobulin  The patient does have some pain at the area  She denies any drainage or other injuries  She has not taken or done anything for her foot      Review of systems:  Otherwise negative unless stated as above    She is well-appearing, in no acute distress  She does have a scab to the forefoot where the puncture wound occurred  There is mild tenderness around the area with minimal redness  There is no induration or fluctuance, streaking erythema  She does endorse mild pain with movement of the toes but is neurovascular intact distally  Exam is otherwise unremarkable  We will get an x-ray of the foot to ensure no underlying bony injury from the puncture wound, start her on antibiotics and update tetanus shot  She has had primary tetanus series as well as booster doses throughout her adult life, and does not have active tetanus so does not currently meet criteria for tetanus immunoglobulin today  I did discuss this with Madelin Jorgensen, who says that the information she was looking at said high risk patient might required immunoglobulin  Given the patient's immunosuppression she was concerned this would be indicated for the patient  She will have the office reach out to the patient for close follow-up  X-ray was reviewed by myself, and shows no acute abnormalities  I discussed with her findings, management at home, close follow-up, and indications for return, and she expresses understanding with this plan         Amount and/or Complexity of Data Reviewed  Tests in the radiology section of CPT®: reviewed and ordered  Discuss the patient with other providers: yes  Independent visualization of images, tracings, or specimens: yes        Disposition  Final diagnoses:   Puncture wound of right foot, initial encounter     Time reflects when diagnosis was documented in both MDM as applicable and the Disposition within this note     Time User Action Codes Description Comment    7/18/2022  4:22 PM Paris Andrade Add [[id number]] Puncture wound of right foot, initial encounter       ED Disposition     ED Disposition   Discharge    Condition   Good    Date/Time   Mon Jul 18, 2022 4:22 PM    Comment   Rafy Sin discharge to home/self care  Follow-up Information     Follow up With Specialties Details Why Contact Info    Debby Lyon MD Internal Medicine, Hospice Services, Palliative Care Schedule an appointment as soon as possible for a visit  to follow up on your foot [address] [place] [address]      your podiatrist  Schedule an appointment as soon as possible for a visit  to follow up on your foot           Patient's Medications   Discharge Prescriptions    CEPHALEXIN (KEFLEX) 500 MG CAPSULE    Take 1 capsule (500 mg total) by mouth 3 (three) times a day for 7 days       Start Date: 7/18/2022 End Date: 7/25/2022       Order Dose: 500 mg       Quantity: 20 capsule    Refills: 0    SULFAMETHOXAZOLE-TRIMETHOPRIM (BACTRIM DS) 800-160 MG PER TABLET    Take 1 tablet by mouth 2 (two) times a day for 7 days smx-tmp DS (BACTRIM) 800-160 mg tabs (1tab q12 D10)       Start Date: 7/18/2022 End Date: 7/25/2022       Order Dose: 1 tablet       Quantity: 13 tablet    Refills: 0       No discharge procedures on file      PDMP Review       Value Time User    PDMP Reviewed  Yes 6/24/2022 11:58 AM Debby Lyon MD          ED Provider  Electronically Signed by           Kenneth Bhatti MD  07/18/22 5208

## 2022-07-19 NOTE — RESULT ENCOUNTER NOTE
Appearance of foreign body is not metallic density, so if one is present is not likely from her recent injury  I did call and discuss this with the patient, and she says she will mention it to her podiatrist when she follows up

## 2022-07-25 ENCOUNTER — OFFICE VISIT (OUTPATIENT)
Dept: INTERNAL MEDICINE CLINIC | Facility: CLINIC | Age: 52
End: 2022-07-25
Payer: MEDICARE

## 2022-07-25 VITALS
DIASTOLIC BLOOD PRESSURE: 84 MMHG | BODY MASS INDEX: 38.52 KG/M2 | RESPIRATION RATE: 18 BRPM | HEART RATE: 82 BPM | HEIGHT: 63 IN | SYSTOLIC BLOOD PRESSURE: 134 MMHG | WEIGHT: 217.4 LBS

## 2022-07-25 DIAGNOSIS — S99.921S INJURY OF RIGHT FOOT, SEQUELA: Primary | ICD-10-CM

## 2022-07-25 PROCEDURE — G0439 PPPS, SUBSEQ VISIT: HCPCS | Performed by: NURSE PRACTITIONER

## 2022-07-25 PROCEDURE — 99214 OFFICE O/P EST MOD 30 MIN: CPT | Performed by: NURSE PRACTITIONER

## 2022-07-25 NOTE — PROGRESS NOTES
Assessment and Plan:   S/p foot injury- stepped on nail w/ [place]  Was high risk for tetanus- we had advised she to to ER - she had had her last tetanus 2015 she was boosted in the ER  Given bactrim/kelflex could not tolerate both only taking bactrim  Doing well ? Foreign body to foot- she shared image w/ podiatry he is not concerned  Problem List Items Addressed This Visit    None          Preventive health issues were discussed with patient, and age appropriate screening tests were ordered as noted in patient's After Visit Summary  Personalized health advice and appropriate referrals for health education or preventive services given if needed, as noted in patient's After Visit Summary  History of Present Illness:     Patient presents for a Medicare Wellness Visit    Follow up ER visit for stepping on a nail on her farm high risk b/c aminal waste around the area where she was injuried     Patient Care Team:  Annalee Christian MD as PCP - General (Internal Medicine)  MD Ronnie Chua MD as Endoscopist  Ruth Moser PA-C (Gastroenterology)  Theo Bence, PA-C as Physician Assistant (Gastroenterology)     Review of Systems:     Review of Systems   Constitutional: Negative for appetite change, chills, diaphoresis, fatigue, fever and unexpected weight change  HENT: Negative for postnasal drip and sneezing  Eyes: Negative for visual disturbance  Respiratory: Negative for chest tightness and shortness of breath  Cardiovascular: Negative for chest pain, palpitations and leg swelling  Gastrointestinal: Negative for abdominal pain and blood in stool  Endocrine: Negative for cold intolerance, heat intolerance, polydipsia, polyphagia and polyuria  Genitourinary: Negative for difficulty urinating, dysuria, frequency and urgency  Musculoskeletal: Negative for arthralgias and myalgias  Skin: Negative for rash and wound          Soreness to right foot Neurological: Negative for dizziness, weakness, light-headedness and headaches  Hematological: Negative for adenopathy  Psychiatric/Behavioral: Negative for confusion, dysphoric mood and sleep disturbance  The patient is not nervous/anxious           Problem List:     Patient Active Problem List   Diagnosis    Inflammatory spondylopathy of lumbosacral region ([facility] )    Hyperlipidemia    HLA B27 (HLA B27 positive)    Elevated liver enzymes    Depression    Autoimmune disease ([place] [address] )    Pulmonary embolism (HCC)    RSD (reflex sympathetic dystrophy)    Unknown skin lesion    Excoriation (skin-picking) disorder    Iron deficiency anemia due to chronic blood loss    Iron deficiency anemia    Gastroesophageal reflux disease without esophagitis    Health maintenance examination    Tubular adenoma    Type 2 diabetes mellitus without complication, without long-term current use of insulin ([facility])    Other specified disorders involving the immune mechanism, not elsewhere classified ([place] [address] )    Episode of recurrent major depressive disorder, unspecified depression episode severity ([place] [address] )    Pseudopolyposis of colon without complication, unspecified part of colon ([place] [address] )    Obesity, morbid ([place] [address] )    Exertional angina ([place] [address] )    Continuous opioid dependence ([place] [address] )      Past Medical and Surgical History:     Past Medical History:   Diagnosis Date    Arthritis     Colon polyp     CRPS (complex regional pain syndrome)     DVT (deep venous thrombosis) ([facility])     GERD (gastroesophageal reflux disease)     H/O total hysterectomy     Inflammatory spondylopathies ([place] [address] )     Nonmelanoma skin cancer     LAST ASSESSED: [id number]    PE (pulmonary thromboembolism) (HCC)     PONV (postoperative nausea and vomiting)     Squamous cell carcinoma     Squamous cell skin cancer 2020    nose     Urinary incontinence     Uterine leiomyoma     LAST ASSESSED: [id number]     Past Surgical History:   Procedure Laterality Date    ANKLE SURGERY      BACK SURGERY  2018    BELOW KNEE LEG AMPUTATION      LAST ASSESSED: [id number]    BUNIONECTOMY Right      SECTION      HYSTERECTOMY      LAST ASSESSED: [id number]    OTHER SURGICAL HISTORY      SPINAL STEROTAXIS STIMULATION OF CORD; LAST ASSESSED: [id number]    MS COLONOSCOPY FLX DX W/COLLJ SPEC WHEN PFRMD N/A 2018    Procedure: COLONOSCOPY;  Surgeon: Earnestine Sherman MD;  Location: [facility]; Service: Gastroenterology    MS ESOPHAGOGASTRODUODENOSCOPY TRANSORAL DIAGNOSTIC N/A 2018    Procedure: ESOPHAGOGASTRODUODENOSCOPY (EGD); Surgeon: Earnestine Sherman MD;  Location: [facility];   Service: Gastroenterology    MS LAP,RMV  ADNEXAL STRUCTURE N/A 11/10/2017    Procedure: LAPAROSCOPIC REMOVAL OF BILATERAL OVARIES;  LYSIS OF ADHESIONS;  Surgeon: Aleida Manzanares MD;  Location: [facility];  Service: Gynecology    SINUS SURGERY        Family History:     Family History   Problem Relation Age of Onset    Hypertension Mother     Heart disease Mother     Coronary artery disease Mother         Due to calcified coronary lesion     Diabetes Father         Type 2, controlled with neuropathy     Vitiligo Brother     Breast cancer Cousin 28    Breast cancer Other     Vitiligo Daughter       Social History:     Social History     Socioeconomic History    Marital status: Single     Spouse name: None    Number of children: None    Years of education: None    Highest education level: None   Occupational History    Occupation: Retired   Tobacco Use    Smoking status: Never Smoker    Smokeless tobacco: Never Used   Vaping Use    Vaping Use: Never used   Substance and Sexual Activity    Alcohol use: No    Drug use: No    Sexual activity: Yes     Partners: Male   Other Topics Concern    None   Social History Narrative    None     Social Determinants of Health     Financial Resource Strain: Not on file   Food Insecurity: Not on file   Transportation Needs: Not on file   Physical Activity: Not on file   Stress: Not on file   Social Connections: Not on file   Intimate Partner Violence: Not on file   Housing Stability: Not on file      Medications and Allergies:     Current Outpatient Medications   Medication Sig Dispense Refill    b complex vitamins capsule Take 1 capsule by mouth daily      buPROPion (WELLBUTRIN XL) 300 mg 24 hr tablet TAKE 1 TABLET BY MOUTH EVERY DAY IN THE MORNING 90 tablet 3    calcium carbonate (OS-APRYL) 600 MG tablet Take 600 mg by mouth 2 (two) times a day with meals      cephalexin (KEFLEX) 500 mg capsule Take 1 capsule (500 mg total) by mouth 3 (three) times a day for 7 days 20 capsule 0    cevimeline (EVOXAC) 30 MG capsule Take 30 mg by mouth 3 (three) times a day      Cholecalciferol (VITAMIN D3) 5000 units CAPS Take by mouth      desonide (DESOWEN) 0 05 % cream Apply topically 2 (two) times a day (Patient taking differently: Apply topically as needed) 60 g 3    ferrous sulfate 324 (65 Fe) mg Take 1 tablet (324 mg total) by mouth every other day      folic acid (FOLVITE) 1 mg tablet Take 3 tablets by mouth daily        glucosamine-chondroitin 500-400 MG tablet Take 1 tablet by mouth 3 (three) times a day      HYDROcodone-acetaminophen (NORCO) 5-325 mg per tablet Take 1 tablet by mouth every 8 (eight) hours as needed      inFLIXimab (REMICADE IV) Infuse into a venous catheter Every 6 weeks        LORazepam (ATIVAN) 0 5 mg tablet Take 1 tablet (0 5 mg total) by mouth daily at bedtime 30 tablet 0    methotrexate 2 5 mg tablet Take 2 5 mg by mouth once a week 8 pills weekly      neomycin-polymyxin-hydrocortisone (CORTISPORIN) otic solution Administer 3 drops to the right ear every 8 (eight) hours 10 mL 0    omeprazole (PriLOSEC) 40 MG capsule Take 1 capsule (40 mg total) by mouth daily 90 capsule 0    predniSONE 10 mg tablet Take 3 tablets for 3 days then 2 tablets for 3 days then 1 tablet for 3 days 18 tablet 0    Probiotic Product (PROBIOTIC-10) CAPS Take by mouth      rosuvastatin (CRESTOR) 10 MG tablet TAKE 1 TABLET BY MOUTH EVERY DAY 90 tablet 1    sertraline (ZOLOFT) 100 mg tablet TAKE 1 TABLET BY MOUTH EVERY DAY 90 tablet 3    sulfamethoxazole-trimethoprim (BACTRIM DS) 800-160 mg per tablet Take 1 tablet by mouth 2 (two) times a day for 7 days smx-tmp DS (BACTRIM) 800-160 mg tabs (1tab q12 D10) 13 tablet 0    VITAMIN B COMPLEX-C PO vitamin B complex   Take 1 tablet by mouth once daily   Xarelto 20 MG tablet TAKE 1 TABLET BY MOUTH EVERY DAY 90 tablet 3    mometasone (NASONEX) 50 mcg/act nasal spray SPRAY 2 SPRAYS INTO EACH NOSTRIL EVERY DAY 17 g 2     No current facility-administered medications for this visit  Allergies   Allergen Reactions    Leflunomide Shortness Of Breath and Palpitations    Penicillins Hives    Ciprofloxacin Rash    Codeine Itching and Rash    Morphine Itching and Rash      Immunizations:     Immunization History   Administered Date(s) Administered    INFLUENZA 11/07/2015, 11/16/2016, 10/05/2017, 10/25/2018, 10/26/2019, 10/31/2020    Influenza Quadrivalent 3 years and older 11/01/2019    Influenza Quadrivalent, 6-35 Months IM 11/16/2016    Influenza, seasonal, injectable 10/07/2010, 10/05/2017    Pneumococcal Polysaccharide PPV23 04/12/2019    Tdap 10/30/2017, 07/18/2022    influenza, injectable, quadrivalent 11/01/2019      Health Maintenance:         Topic Date Due    Breast Cancer Screening: Mammogram  04/16/2020    Colorectal Cancer Screening  11/02/2024    HIV Screening  Completed    Hepatitis C Screening  Completed         Topic Date Due    COVID-19 Vaccine (1) Never done    Pneumococcal Vaccine: Pediatrics (0 to 5 Years) and At-Risk Patients (6 to 59 Years) (2 - PCV) 04/12/2020    Influenza Vaccine (1) 09/01/2022      Medicare Screening Tests and Risk Assessments:     Ronaldo Ordaz is here for her Subsequent Wellness visit       Health Risk Assessment:   Patient rates overall health as fair  Patient feels that their physical health rating is same  Patient is satisfied with their life  Eyesight was rated as slightly worse  Hearing was rated as same  Patient feels that their emotional and mental health rating is same  Patients states they are never, rarely angry  Patient states they are always unusually tired/fatigued  Pain experienced in the last 7 days has been none  Patient states that she has experienced no weight loss or gain in last 6 months  Depression Screening:   PHQ-9 Score: 0      Fall Risk Screening: In the past year, patient has experienced: no history of falling in past year      Urinary Incontinence Screening:   Patient has leaked urine accidently in the last six months  Home Safety:  Patient does not have trouble with stairs inside or outside of their home  Patient has working smoke alarms and has working carbon monoxide detector  Home safety hazards include: none  Nutrition:   Current diet is Regular  Medications:   Patient is currently taking over-the-counter supplements  OTC medications include: see medication list  Patient is able to manage medications  Activities of Daily Living (ADLs)/Instrumental Activities of Daily Living (IADLs):   Walk and transfer into and out of bed and chair?: Yes  Dress and groom yourself?: Yes    Bathe or shower yourself?: Yes    Feed yourself?  Yes  Do your laundry/housekeeping?: Yes  Manage your money, pay your bills and track your expenses?: Yes  Make your own meals?: Yes    Do your own shopping?: Yes    Previous Hospitalizations:   Any hospitalizations or ED visits within the last 12 months?: Yes    How many hospitalizations have you had in the last year?: 1-2    Advance Care Planning:   Living will: Yes    Advanced directive: Yes    Five wishes given: Yes      Cognitive Screening:   Provider or family/friend/caregiver concerned regarding cognition?: No    PREVENTIVE SCREENINGS      Cardiovascular Screening: General: Screening Not Indicated and History Lipid Disorder      Diabetes Screening:     General: Screening Not Indicated and History Diabetes      Colorectal Cancer Screening:     General: Screening Current      Cervical Cancer Screening:    General: Screening Not Indicated      Osteoporosis Screening:    General: Screening Not Indicated      Abdominal Aortic Aneurysm (AAA) Screening:        General: Screening Not Indicated      Lung Cancer Screening:     General: Screening Not Indicated      Hepatitis C Screening:    General: Screening Current    Screening, Brief Intervention, and Referral to Treatment (SBIRT)    Screening      Single Item Drug Screening:  How often have you used an illegal drug (including marijuana) or a prescription medication for non-medical reasons in the past year? never    Single Item Drug Screen Score: 0  Interpretation: Negative screen for possible drug use disorder    Review of Current Opioid Use    Opioid Risk Tool (ORT) Interpretation: Complete Opioid Risk Tool (ORT)    No exam data present     Physical Exam:     There were no vitals taken for this visit  Physical Exam  Constitutional:       Appearance: She is well-developed  HENT:      Head: Normocephalic and atraumatic  Eyes:      Pupils: Pupils are equal, round, and reactive to light  Neck:      Thyroid: No thyromegaly  Cardiovascular:      Rate and Rhythm: Normal rate and regular rhythm  Pulses: no weak pulses          Dorsalis pedis pulses are 2+ on the right side  Posterior tibial pulses are 2+ on the right side  Heart sounds: No murmur heard  Pulmonary:      Effort: Pulmonary effort is normal       Breath sounds: Normal breath sounds  Abdominal:      General: Bowel sounds are normal       Palpations: Abdomen is soft  Musculoskeletal:         General: Normal range of motion  Cervical back: Normal range of motion and neck supple     Feet:      Right foot:      Skin integrity: No ulcer, skin breakdown, erythema, warmth, callus or dry skin  Left foot: amputated  Lymphadenopathy:      Cervical: No cervical adenopathy  Skin:     General: Skin is warm and dry  Comments: Puncture site to right plantar foot no redness or warmth   Neurological:      Mental Status: She is alert and oriented to person, place, and time  Diabetic Foot Exam    Patient's shoes and socks removed  Right Foot/Ankle   Right Foot Inspection  Skin Exam: skin normal and skin intact  No dry skin, no warmth, no callus, no erythema, no maceration, no abnormal color, no pre-ulcer, no ulcer and no callus  Toe Exam: ROM and strength within normal limits  Sensory   Vibration: intact  Proprioception: intact  Monofilament testing: intact    Vascular  Capillary refills: < 3 seconds  The right DP pulse is 2+  The right PT pulse is 2+       Left Foot/Ankle  Left Foot Inspection  Skin Exam: skin normal  Amputation: amputation left foot     Assign Risk Category  No deformity present  No loss of protective sensation  No weak pulses  Risk: [address], CRNP

## 2022-09-19 DIAGNOSIS — K21.9 GASTROESOPHAGEAL REFLUX DISEASE WITHOUT ESOPHAGITIS: ICD-10-CM

## 2022-09-19 RX ORDER — OMEPRAZOLE 40 MG/1
40 CAPSULE, DELAYED RELEASE ORAL DAILY
Qty: 90 CAPSULE | Refills: 0 | Status: SHIPPED | OUTPATIENT
Start: 2022-09-19

## 2022-12-07 ENCOUNTER — OFFICE VISIT (OUTPATIENT)
Dept: INTERNAL MEDICINE CLINIC | Facility: CLINIC | Age: 52
End: 2022-12-07

## 2022-12-07 VITALS
OXYGEN SATURATION: 97 % | HEART RATE: 86 BPM | RESPIRATION RATE: 18 BRPM | DIASTOLIC BLOOD PRESSURE: 78 MMHG | TEMPERATURE: 97.6 F | SYSTOLIC BLOOD PRESSURE: 118 MMHG | WEIGHT: 212.6 LBS | BODY MASS INDEX: 37.66 KG/M2

## 2022-12-07 DIAGNOSIS — T78.40XA ALLERGIC REACTION, INITIAL ENCOUNTER: ICD-10-CM

## 2022-12-07 DIAGNOSIS — J01.00 ACUTE NON-RECURRENT MAXILLARY SINUSITIS: Primary | ICD-10-CM

## 2022-12-07 RX ORDER — CEPHALEXIN 500 MG/1
500 CAPSULE ORAL EVERY 12 HOURS SCHEDULED
Qty: 14 CAPSULE | Refills: 0 | Status: SHIPPED | OUTPATIENT
Start: 2022-12-07 | End: 2022-12-14

## 2022-12-07 NOTE — PROGRESS NOTES
Name: Arlen Farah      : 1970      MRN: [id number]  Encounter Provider: Parish Chamberlain DO  Encounter Date: 2022   Encounter department: [facility] was seen today for physical exam     Diagnoses and all orders for this visit:    Acute non-recurrent maxillary sinusitis  -     cephalexin (KEFLEX) 500 mg capsule; Take 1 capsule (500 mg total) by mouth every 12 (twelve) hours for 7 days    Allergic reaction, initial encounter    facial and sublingual edema probable allergic reaction to therapy taken cough  Pt to use benadryl tonight and avoid tessalon perles for now  Cough due to PND and pharyngeal irritation rather than bronchiospasm  lower respiratory clear  Will treat for sinusitis based on presentation  Pt to f/u prn  Subjective          Pt presents c/o harsh cough 5 days  Worse at night  associated with congestion  Took tessalon perle yesterday evening and noted  swelling under tongue a few minutes later  Also attest to fascial swelling around her jaw line  Since she hasn't taken anything else  Swelling has improved a bit per pt    but a bit smaller  Review of Systems   Constitutional: Negative for fever  HENT: Positive for facial swelling, postnasal drip and rhinorrhea  Negative for trouble swallowing  Respiratory: Positive for cough  Negative for shortness of breath  Cardiovascular: Negative for chest pain         Current Outpatient Medications on File Prior to Visit   Medication Sig   • b complex vitamins capsule Take 1 capsule by mouth daily   • buPROPion (WELLBUTRIN XL) 300 mg 24 hr tablet TAKE 1 TABLET BY MOUTH EVERY DAY IN THE MORNING   • calcium carbonate (OS-APRYL) 600 MG tablet Take 600 mg by mouth 2 (two) times a day with meals   • cevimeline (EVOXAC) 30 MG capsule Take 30 mg by mouth 3 (three) times a day   • Cholecalciferol (VITAMIN D3) 5000 units CAPS Take by mouth   • desonide (DESOWEN) 0 05 % cream Apply topically 2 (two) times a day (Patient taking differently: Apply topically as needed)   • ferrous sulfate 324 (65 Fe) mg Take 1 tablet (324 mg total) by mouth every other day   • folic acid (FOLVITE) 1 mg tablet Take 3 tablets by mouth daily     • glucosamine-chondroitin 500-400 MG tablet Take 1 tablet by mouth 3 (three) times a day   • HYDROcodone-acetaminophen (NORCO) 5-325 mg per tablet Take 1 tablet by mouth every 8 (eight) hours as needed   • inFLIXimab (REMICADE IV) Infuse into a venous catheter Every 6 weeks  • LORazepam (ATIVAN) 0 5 mg tablet Take 1 tablet (0 5 mg total) by mouth daily at bedtime   • methotrexate 2 5 mg tablet Take 2 5 mg by mouth once a week 8 pills weekly   • mometasone (NASONEX) 50 mcg/act nasal spray SPRAY 2 SPRAYS INTO EACH NOSTRIL EVERY DAY   • neomycin-polymyxin-hydrocortisone (CORTISPORIN) otic solution Administer 3 drops to the right ear every 8 (eight) hours   • omeprazole (PriLOSEC) 40 MG capsule TAKE 1 CAPSULE (40 MG TOTAL) BY MOUTH DAILY  • predniSONE 10 mg tablet Take 3 tablets for 3 days then 2 tablets for 3 days then 1 tablet for 3 days   • Probiotic Product (PROBIOTIC-10) CAPS Take by mouth   • rosuvastatin (CRESTOR) 10 MG tablet TAKE 1 TABLET BY MOUTH EVERY DAY   • sertraline (ZOLOFT) 100 mg tablet TAKE 1 TABLET BY MOUTH EVERY DAY   • VITAMIN B COMPLEX-C PO vitamin B complex   Take 1 tablet by mouth once daily  • Xarelto 20 MG tablet TAKE 1 TABLET BY MOUTH EVERY DAY       Objective     /78 (BP Location: Left arm, Patient Position: Sitting, Cuff Size: Standard)   Pulse 86   Temp 97 6 °F (36 4 °C) (Temporal)   Resp 18   Wt 96 4 kg (212 lb 9 6 oz)   SpO2 97%   BMI 37 66 kg/m²     Physical Exam  HENT:      Head: Normocephalic  Nose:      Right Sinus: Maxillary sinus tenderness present  Left Sinus: Maxillary sinus tenderness present        Mouth/Throat:      Mouth: Mucous membranes are moist       Pharynx: Posterior oropharyngeal erythema present  Comments: mildly swollen Sublingual papilla   Cardiovascular:      Rate and Rhythm: Normal rate and regular rhythm  Pulmonary:      Effort: Pulmonary effort is normal    Lymphadenopathy:      Cervical: No cervical adenopathy  Neurological:      Mental Status: She is alert         Kristal Torres DO

## 2022-12-15 DIAGNOSIS — E78.49 OTHER HYPERLIPIDEMIA: ICD-10-CM

## 2022-12-15 RX ORDER — ROSUVASTATIN CALCIUM 10 MG/1
TABLET, COATED ORAL
Qty: 90 TABLET | Refills: 1 | Status: SHIPPED | OUTPATIENT
Start: 2022-12-15

## 2022-12-16 DIAGNOSIS — K21.9 GASTROESOPHAGEAL REFLUX DISEASE WITHOUT ESOPHAGITIS: ICD-10-CM

## 2022-12-16 RX ORDER — OMEPRAZOLE 40 MG/1
40 CAPSULE, DELAYED RELEASE ORAL DAILY
Qty: 90 CAPSULE | Refills: 0 | Status: SHIPPED | OUTPATIENT
Start: 2022-12-16

## 2022-12-25 ENCOUNTER — HOSPITAL ENCOUNTER (EMERGENCY)
Facility: HOSPITAL | Age: 52
Discharge: HOME | End: 2022-12-26
Attending: EMERGENCY MEDICINE

## 2022-12-25 ENCOUNTER — APPOINTMENT (EMERGENCY)
Dept: RADIOLOGY | Facility: HOSPITAL | Age: 52
End: 2022-12-25

## 2022-12-25 DIAGNOSIS — R10.11 RIGHT UPPER QUADRANT ABDOMINAL PAIN: Primary | ICD-10-CM

## 2022-12-25 LAB
D DIMER PPP FEU-MCNC: 1.24 UG/ML FEU
FLUAV RNA RESP QL NAA+PROBE: NEGATIVE
FLUBV RNA RESP QL NAA+PROBE: NEGATIVE
LACTATE SERPL-SCNC: 0.7 MMOL/L (ref 0.5–2)
RSV RNA RESP QL NAA+PROBE: NEGATIVE
SARS-COV-2 RNA RESP QL NAA+PROBE: NEGATIVE

## 2022-12-25 RX ORDER — KETOROLAC TROMETHAMINE 30 MG/ML
15 INJECTION, SOLUTION INTRAMUSCULAR; INTRAVENOUS ONCE
Status: COMPLETED | OUTPATIENT
Start: 2022-12-25 | End: 2022-12-25

## 2022-12-25 RX ORDER — ONDANSETRON 2 MG/ML
4 INJECTION INTRAMUSCULAR; INTRAVENOUS ONCE
Status: COMPLETED | OUTPATIENT
Start: 2022-12-25 | End: 2022-12-25

## 2022-12-25 RX ADMIN — KETOROLAC TROMETHAMINE 15 MG: 30 INJECTION, SOLUTION INTRAMUSCULAR; INTRAVENOUS at 22:47

## 2022-12-25 RX ADMIN — SODIUM CHLORIDE 1000 ML: 0.9 INJECTION, SOLUTION INTRAVENOUS at 22:50

## 2022-12-25 RX ADMIN — ONDANSETRON 4 MG: 2 INJECTION INTRAMUSCULAR; INTRAVENOUS at 22:47

## 2022-12-26 ENCOUNTER — APPOINTMENT (EMERGENCY)
Dept: ULTRASOUND IMAGING | Facility: HOSPITAL | Age: 52
End: 2022-12-26

## 2022-12-26 ENCOUNTER — APPOINTMENT (EMERGENCY)
Dept: CT IMAGING | Facility: HOSPITAL | Age: 52
End: 2022-12-26

## 2022-12-26 VITALS
WEIGHT: 212.52 LBS | TEMPERATURE: 98.4 F | HEART RATE: 64 BPM | OXYGEN SATURATION: 96 % | DIASTOLIC BLOOD PRESSURE: 70 MMHG | SYSTOLIC BLOOD PRESSURE: 108 MMHG | BODY MASS INDEX: 37.65 KG/M2 | RESPIRATION RATE: 18 BRPM

## 2022-12-26 PROBLEM — R10.9 ABDOMINAL PAIN: Status: ACTIVE | Noted: 2022-12-26

## 2022-12-26 PROBLEM — R07.9 CHEST PAIN: Status: ACTIVE | Noted: 2022-12-26

## 2022-12-26 LAB
2HR DELTA HS TROPONIN: -1 NG/L
ALBUMIN SERPL BCP-MCNC: 3.7 G/DL (ref 3.5–5)
ALP SERPL-CCNC: 81 U/L (ref 46–116)
ALT SERPL W P-5'-P-CCNC: 40 U/L (ref 12–78)
ANION GAP SERPL CALCULATED.3IONS-SCNC: 8 MMOL/L (ref 4–13)
AST SERPL W P-5'-P-CCNC: 30 U/L (ref 5–45)
ATRIAL RATE: 80 BPM
BASOPHILS # BLD AUTO: 0.07 THOUSANDS/ÂΜL (ref 0–0.1)
BASOPHILS NFR BLD AUTO: 1 % (ref 0–1)
BILIRUB SERPL-MCNC: 0.27 MG/DL (ref 0.2–1)
BUN SERPL-MCNC: 16 MG/DL (ref 5–25)
CALCIUM SERPL-MCNC: 8.9 MG/DL (ref 8.3–10.1)
CARDIAC TROPONIN I PNL SERPL HS: 2 NG/L
CARDIAC TROPONIN I PNL SERPL HS: 3 NG/L
CHLORIDE SERPL-SCNC: 107 MMOL/L (ref 96–108)
CO2 SERPL-SCNC: 27 MMOL/L (ref 21–32)
CREAT SERPL-MCNC: 0.84 MG/DL (ref 0.6–1.3)
EOSINOPHIL # BLD AUTO: 0.23 THOUSAND/ÂΜL (ref 0–0.61)
EOSINOPHIL NFR BLD AUTO: 3 % (ref 0–6)
ERYTHROCYTE [DISTWIDTH] IN BLOOD BY AUTOMATED COUNT: 17.1 % (ref 11.6–15.1)
GFR SERPL CREATININE-BSD FRML MDRD: 79 ML/MIN/1.73SQ M
GLUCOSE SERPL-MCNC: 104 MG/DL (ref 65–140)
HCT VFR BLD AUTO: 36.6 % (ref 34.8–46.1)
HGB BLD-MCNC: 11.7 G/DL (ref 11.5–15.4)
IMM GRANULOCYTES # BLD AUTO: 0.02 THOUSAND/UL (ref 0–0.2)
IMM GRANULOCYTES NFR BLD AUTO: 0 % (ref 0–2)
LIPASE SERPL-CCNC: 222 U/L (ref 73–393)
LYMPHOCYTES # BLD AUTO: 2.65 THOUSANDS/ÂΜL (ref 0.6–4.47)
LYMPHOCYTES NFR BLD AUTO: 35 % (ref 14–44)
MCH RBC QN AUTO: 25.4 PG (ref 26.8–34.3)
MCHC RBC AUTO-ENTMCNC: 32 G/DL (ref 31.4–37.4)
MCV RBC AUTO: 79 FL (ref 82–98)
MONOCYTES # BLD AUTO: 0.69 THOUSAND/ÂΜL (ref 0.17–1.22)
MONOCYTES NFR BLD AUTO: 9 % (ref 4–12)
NEUTROPHILS # BLD AUTO: 3.84 THOUSANDS/ÂΜL (ref 1.85–7.62)
NEUTS SEG NFR BLD AUTO: 52 % (ref 43–75)
NRBC BLD AUTO-RTO: 0 /100 WBCS
P AXIS: 58 DEGREES
PLATELET # BLD AUTO: 245 THOUSANDS/UL (ref 149–390)
PMV BLD AUTO: 11.2 FL (ref 8.9–12.7)
POTASSIUM SERPL-SCNC: 3.9 MMOL/L (ref 3.5–5.3)
PR INTERVAL: 146 MS
PROT SERPL-MCNC: 7.8 G/DL (ref 6.4–8.4)
QRS AXIS: 66 DEGREES
QRSD INTERVAL: 74 MS
QT INTERVAL: 376 MS
QTC INTERVAL: 433 MS
RBC # BLD AUTO: 4.61 MILLION/UL (ref 3.81–5.12)
SODIUM SERPL-SCNC: 142 MMOL/L (ref 135–147)
T WAVE AXIS: 32 DEGREES
VENTRICULAR RATE: 80 BPM
WBC # BLD AUTO: 7.5 THOUSAND/UL (ref 4.31–10.16)

## 2022-12-26 RX ADMIN — IOHEXOL 100 ML: 350 INJECTION, SOLUTION INTRAVENOUS at 00:39

## 2022-12-26 NOTE — CONSULTS
GENERAL SURGERY Consult to the ED     Girish Roberts 45 y o  female MRN: [id number]  Unit/Bed#: ED 19 Encounter: [id number]      Assessment/Plan   Acute Cholecystitis, cholelithiasis    Pt states that the pain is now gone and only has tenderness when it is pressed on  Nausea and vomiting had resolved  VSS, no leukocytosis or elevation of LFT with work up;     -follow up as outpatient   -follow a low fat diet until seen in the office  -return to ED if symptoms develop or worsen   -pt is agreeable to interval cholecystectomy           Chief Complaint last night I developed pain in my lower substernal then it went to my right scapula      HPI: Girish Roberts is a 45y o  year old female  PHM: DVT PE 12 years ago  On Xarelto but she did not have it in 2 days  Ankylosing Spondylitis Sjogren's disease, LLE amputation for nonunion, non healing, failed surgery  GERD, h/o Open Hysterectomy, and Anterior Approach of Spinal Fusion  who presents with pain that started "lower substernal" last night  She states the pain then progressing to epigastric area and then to her right scapula  She denies SOB, Chest pain, cardiac profile negative on work up  CT of Abdomen demonstrated  Gall stones in the neck  cystic duct  Some wall thickening, US demonstrated wall thickening of 5 mm  Non-mobile adherent stone vs glal bladder polyp in the posterior wall  CTA was negative for PE  Imaging Studies: XR chest 1 view portable    Result Date: 12/26/2022  Impression: No acute cardiopulmonary disease  Findings are stable Workstation performed: [id number]     PE Study with CT Abdomen and Pelvis with contrast    Result Date: 12/26/2022  Impression: No pulmonary embolism is seen  Scattered air trapping in the bilateral lungs but no acute pulmonary process is seen  Tiny gallstone in the region of the gallbladder neck/cystic duct    There is some gallbladder wall thickening suspected, nonspecific but consider acute cholecystitis in the appropriate clinical setting  Other findings as above  Findings are consistent with the preliminary report from [facility] which was provided shortly after completion of the exam  The additional finding of  possible acute cholecystitis was communicated to [address] by Dr Eva Kyle on 12/26/2022 at 3:23 AM  Workstation performed: [id number]     US right upper quadrant    Result Date: 12/26/2022  Impression: Fatty infiltration of the liver is suspected  In the setting of abdominal pain and/or elevated liver function tests, consider steatohepatitis  0 7 cm echogenic focus seen along the posterior gallbladder wall, nonmobile, could represent adherent stone versus gallbladder polyp  Gallbladder wall thickening, as described  Differential considerations include gallbladder wall edema or acute versus chronic inflammation  Correlation with the patient's symptoms and laboratory values recommended  Other findings as above   Workstation performed: [id number]     Lab Results:   CBC with diff:   Lab Results   Component Value Date    WBC 7 50 12/25/2022    HGB 11 7 12/25/2022    HCT 36 6 12/25/2022    MCV 79 (L) 12/25/2022     12/25/2022    MCH 25 4 (L) 12/25/2022    MCHC 32 0 12/25/2022    RDW 17 1 (H) 12/25/2022    MPV 11 2 12/25/2022    NRBC 0 12/25/2022   , BMP/CMP:   Lab Results   Component Value Date    SODIUM 142 12/25/2022    K 3 9 12/25/2022     12/25/2022    CO2 27 12/25/2022    BUN 16 12/25/2022    CREATININE 0 84 12/25/2022    CALCIUM 8 9 12/25/2022    AST 30 12/25/2022    ALT 40 12/25/2022    ALKPHOS 81 12/25/2022    EGFR 79 12/25/2022         Historical Information   Past Medical History:   Diagnosis Date   • Arthritis    • Colon polyp    • CRPS (complex regional pain syndrome)    • DVT (deep venous thrombosis) (HCC)    • GERD (gastroesophageal reflux disease)    • H/O total hysterectomy    • Inflammatory spondylopathies ([facility] [address] )    • Nonmelanoma skin cancer     LAST ASSESSED: [id number]   • PE (pulmonary thromboembolism) ([facility])    • PONV (postoperative nausea and vomiting)    • Squamous cell carcinoma    • Squamous cell skin cancer     nose    • Urinary incontinence    • Uterine leiomyoma     LAST ASSESSED: [id number]     Past Surgical History:   Procedure Laterality Date   • ANKLE SURGERY     • BACK SURGERY  2018   • BELOW KNEE LEG AMPUTATION      LAST ASSESSED: [id number]   • BUNIONECTOMY Right    •  SECTION     • HYSTERECTOMY      LAST ASSESSED: [id number]   • OTHER SURGICAL HISTORY      SPINAL STEROTAXIS STIMULATION OF CORD; LAST ASSESSED: [id number]   • ME COLONOSCOPY FLX DX W/COLLJ SPEC WHEN PFRMD N/A 2018    Procedure: COLONOSCOPY;  Surgeon: Charity Gavin MD;  Location: [facility]; Service: Gastroenterology   • ME ESOPHAGOGASTRODUODENOSCOPY TRANSORAL DIAGNOSTIC N/A 2018    Procedure: ESOPHAGOGASTRODUODENOSCOPY (EGD); Surgeon: Charity Gavin MD;  Location: [facility]; Service: Gastroenterology   • ME LAPAROSCOPY W/RMVL ADNEXAL STRUCTURES N/A 11/10/2017    Procedure: LAPAROSCOPIC REMOVAL OF BILATERAL OVARIES;  LYSIS OF ADHESIONS;  Surgeon: Dyan Lu MD;  Location: [facility];  Service: Gynecology   • SINUS SURGERY       Social History   Social History     Substance and Sexual Activity   Alcohol Use No     Social History     Substance and Sexual Activity   Drug Use No     Social History     Tobacco Use   Smoking Status Never   Smokeless Tobacco Never     Family History: no pertinent family history  Allergies   Allergen Reactions   • Leflunomide Shortness Of Breath and Palpitations   • Penicillins Hives   • Tessalon Perles [Benzonatate] Facial Swelling   • Ciprofloxacin Rash   • Codeine Itching and Rash   • Morphine Itching and Rash     Meds/Allergies     current meds:   No current facility-administered medications for this encounter  Objective   Vitals: ,Body mass index is 37 65 kg/m²      Intake/Output Summary (Last  hours) at 12/26/2022 7620  Last data filed at 12/26/2022 0146  Gross per 24 hour   Intake 1000 ml   Output --   Net 1000 ml     @LDASHORT    @ROS:  12 set ROS reviewed and negative except for what is noted in the HPI      Physical Exam:Blood pressure 108/70, pulse 64, temperature 98 4 °F (36 9 °C), temperature source Oral, resp  rate 18, weight 96 4 kg (212 lb 8 4 oz), SpO2 96 %  General appearance: alert, appears stated age and cooperative  HEENT: PERRLA, EOMI, sclera clear, anicterus  Back: no tenderness,deformity,   Lungs:clear throughout  Heart[de-identified] RRR, S1, S2 normal, no murmur    Abdomen: soft, mild tenderness of the RUQ  No Rush's sign  No hernia, organomegaly, or masses     NBS     Extremities: FROM no joint deformities, motor,sensory intact, pedal edema: none, sp BKA LLE   Skin: no rashes or lesions   Neurologic: CN II-XII grossly intact, no tremor, affect appropriate      Ray PunchesCARTER  12/26/2022

## 2022-12-26 NOTE — ASSESSMENT & PLAN NOTE
Substernal chest pain   · CJ score   · EKG non ischemic, troponin negative   · Suspect deferred from gallbladder

## 2022-12-26 NOTE — ASSESSMENT & PLAN NOTE
Presents with epigastric/RUQ pain and nausea   · LFTs, lipase, lactic acid wnl  · CT a/p: tiny gallstone in the region of the gallbladder neck/cystic duct    mild gallbladder wall thickening suspected, possible cholecystitis   · Begin empiric IV ceftriaxone  · IVFs, pain management, anti-emetics, supportive care    · Consult general surgery

## 2022-12-26 NOTE — ASSESSMENT & PLAN NOTE
Recent off xarelto for 3d for placement of brain stimulator  Resumed 1 week ago    · CTA neg for PE   · continue xareto

## 2022-12-26 NOTE — ED CARE HANDOFF
Emergency Department Sign Out Note        Sign out and transfer of care from [facility]  See Separate Emergency Department note  The patient, Polina Valera, was evaluated by the previous provider for chest pain  Workup Completed:  Labs, EKG, CTA CAP, RUQ ultrasound  ED Course / Workup Pending (followup):  RUQ ultrasound shows gallbladder wall thickening  DDx wall edema vs chronic inflammation  Remainder of workup negative  Awaiting general surgery evaluation  ED Course as of 12/26/22 1725   Mon Dec 26, 2022   2364 Surgery AP at bedside  4990 Patient evaluated by general surgery who cleared patient for discharge  Plan for low fat diet and outpatient f/u in 2 weeks  ED return precautions discussed with patient  Procedures  MDM  Number of Diagnoses or Management Options  Right upper quadrant abdominal pain: new and requires workup  Diagnosis management comments: 52yoF who initially presented with chest pain  See previous provider's note for full details  RUQ ultrasound shows GB wall thickening  She was evaluated by general surgery who cleared her for discharge  Plan for outpatient f/u and interval cholecystectomy with low fat diet in the meantime  ED return precautions discussed including fevers, jaundice, severe pain  Patient expressed understanding and is agreeable to plan  Patient discharged in stable condition           Amount and/or Complexity of Data Reviewed  Discuss the patient with other providers: yes            Disposition  Final diagnoses:   Right upper quadrant abdominal pain     Time reflects when diagnosis was documented in both MDM as applicable and the Disposition within this note     Time User Action Codes Description Comment    12/26/2022  7:40 AM Machelle Victor Add [R10 11] Right upper quadrant abdominal pain       ED Disposition     ED Disposition   Discharge    Condition   Stable    Date/Time   Mon Dec 26, 2022  9:33 AM    Comment Loraine Walls discharge to home/self care  Follow-up Information     Follow up With Specialties Details Why Contact Info Additional Information    Nita Wong MD General Surgery Follow up in 2 week(s)  [address]  [facility] [address]  [address] [facility] Emergency Department Emergency Medicine  If symptoms worsen [address] [facility] [address] [facility] Emergency Department, [address] [place], [place], [address]        Discharge Medication List as of 12/26/2022  9:34 AM      CONTINUE these medications which have NOT CHANGED    Details   b complex vitamins capsule Take 1 capsule by mouth daily, Historical Med      buPROPion (WELLBUTRIN XL) 300 mg 24 hr tablet TAKE 1 TABLET BY MOUTH EVERY DAY IN THE MORNING, Normal      calcium carbonate (OS-APRYL) 600 MG tablet Take 600 mg by mouth 2 (two) times a day with meals, Historical Med      cevimeline (EVOXAC) 30 MG capsule Take 30 mg by mouth 3 (three) times a day, Starting Wed 1/19/2022, Historical Med      Cholecalciferol (VITAMIN D3) 5000 units CAPS Take by mouth, Historical Med      desonide (DESOWEN) 0 05 % cream Apply topically 2 (two) times a day, Starting Wed 2/27/2019, Normal      ferrous sulfate 324 (65 Fe) mg Take 1 tablet (324 mg total) by mouth every other day, Starting Thu 11/7/2019, No Print      folic acid (FOLVITE) 1 mg tablet Take 3 tablets by mouth daily  , Historical Med      glucosamine-chondroitin 500-400 MG tablet Take 1 tablet by mouth 3 (three) times a day, Historical Med      HYDROcodone-acetaminophen (NORCO) 5-325 mg per tablet Take 1 tablet by mouth every 8 (eight) hours as needed, Starting Thu 9/9/2021, Historical Med      inFLIXimab (REMICADE IV) Infuse into a venous catheter Every 6 weeks  , Historical Med      LORazepam (ATIVAN) 0 5 mg tablet Take 1 tablet (0 5 mg total) by mouth daily at bedtime, Starting Fri 6/24/2022, Normal      methotrexate 2 5 mg tablet Take 2 5 mg by mouth once a week 8 pills weekly, Historical Med      mometasone (NASONEX) 50 mcg/act nasal spray SPRAY 2 SPRAYS INTO EACH NOSTRIL EVERY DAY, Normal      neomycin-polymyxin-hydrocortisone (CORTISPORIN) otic solution Administer 3 drops to the right ear every 8 (eight) hours, Starting Mon 2/14/2022, Normal      omeprazole (PriLOSEC) 40 MG capsule TAKE 1 CAPSULE (40 MG TOTAL) BY MOUTH DAILY  , Starting Fri 12/16/2022, Normal      predniSONE 10 mg tablet Take 3 tablets for 3 days then 2 tablets for 3 days then 1 tablet for 3 days, Normal      Probiotic Product (PROBIOTIC-10) CAPS Take by mouth, Historical Med      rosuvastatin (CRESTOR) 10 MG tablet TAKE 1 TABLET BY MOUTH EVERY DAY, Normal      sertraline (ZOLOFT) 100 mg tablet TAKE 1 TABLET BY MOUTH EVERY DAY, Normal      VITAMIN B COMPLEX-C PO vitamin B complex   Take 1 tablet by mouth once daily  , Historical Med      Xarelto 20 MG tablet TAKE 1 TABLET BY MOUTH EVERY DAY, Normal           No discharge procedures on file         ED Provider  Electronically Signed by     Michael Winchester PA-C  12/26/22 4872

## 2022-12-26 NOTE — DISCHARGE INSTR - AVS FIRST PAGE
Call the surgery office for appointment in 2 weeks  Your gall bladder will need to be removed at a later date  Follow a low fat diet until your surgery

## 2022-12-26 NOTE — DISCHARGE INSTRUCTIONS
Please call today to schedule a follow-up with general surgery  Return to the ER with any worsening symptoms, severe pain, fevers, jaundice

## 2022-12-26 NOTE — ED NOTES
Pt resting comfortably at this time, no outward signs of distress     Hayes Kessler RN  12/26/22 0360

## 2022-12-26 NOTE — ED PROVIDER NOTES
History  Chief Complaint   Patient presents with   • Chest Pain     Pt reports chest pain that started around 430  Pain in abd and pain in left shoulder blade  Christina Morales is a 45 y o  female with a pertinent past medical history for pulmonary embolism, on Xarelto, BKA left extremity presenting today with substernal chest pain beginning at 4:30 PM   Patient reports that that pain has resolved and is now localized to the epigastric and right upper quadrant  Associated nausea, no vomiting  Patient reports that she is tender to palpation in the right upper quadrant  Denies any shortness of breath at this time  Reports that she had a outpatient procedure for brain stimulator last week for which she was off of her Xarelto for 3 days  Patient has been back on Xarelto for the past 1 week  She is regularly compliant with her medications at home  Present pain rating to the left shoulder blade  Has never had pain like this in the past   No further complaints at this time  Prior to Admission Medications   Prescriptions Last Dose Informant Patient Reported? Taking? Cholecalciferol (VITAMIN D3) 5000 units CAPS   Yes No   Sig: Take by mouth   HYDROcodone-acetaminophen (NORCO) 5-325 mg per tablet   Yes No   Sig: Take 1 tablet by mouth every 8 (eight) hours as needed   LORazepam (ATIVAN) 0 5 mg tablet   No No   Sig: Take 1 tablet (0 5 mg total) by mouth daily at bedtime   Probiotic Product (PROBIOTIC-10) CAPS   Yes No   Sig: Take by mouth   VITAMIN B COMPLEX-C PO   Yes No   Sig: vitamin B complex   Take 1 tablet by mouth once daily     Xarelto 20 MG tablet   No No   Sig: TAKE 1 TABLET BY MOUTH EVERY DAY   b complex vitamins capsule   Yes No   Sig: Take 1 capsule by mouth daily   buPROPion (WELLBUTRIN XL) 300 mg 24 hr tablet   No No   Sig: TAKE 1 TABLET BY MOUTH EVERY DAY IN THE MORNING   calcium carbonate (OS-APRYL) 600 MG tablet   Yes No   Sig: Take 600 mg by mouth 2 (two) times a day with meals   cevimeline (EVOXAC) 30 MG capsule   Yes No   Sig: Take 30 mg by mouth 3 (three) times a day   desonide (DESOWEN) 0 05 % cream   No No   Sig: Apply topically 2 (two) times a day   Patient taking differently: Apply topically as needed   ferrous sulfate 324 (65 Fe) mg   No No   Sig: Take 1 tablet (324 mg total) by mouth every other day   folic acid (FOLVITE) 1 mg tablet   Yes No   Sig: Take 3 tablets by mouth daily     glucosamine-chondroitin 500-400 MG tablet   Yes No   Sig: Take 1 tablet by mouth 3 (three) times a day   inFLIXimab (REMICADE IV)   Yes No   Sig: Infuse into a venous catheter Every 6 weeks  methotrexate 2 5 mg tablet   Yes No   Sig: Take 2 5 mg by mouth once a week 8 pills weekly   mometasone (NASONEX) 50 mcg/act nasal spray   No No   Sig: SPRAY 2 SPRAYS INTO EACH NOSTRIL EVERY DAY   neomycin-polymyxin-hydrocortisone (CORTISPORIN) otic solution   No No   Sig: Administer 3 drops to the right ear every 8 (eight) hours   omeprazole (PriLOSEC) 40 MG capsule   No No   Sig: TAKE 1 CAPSULE (40 MG TOTAL) BY MOUTH DAILY     predniSONE 10 mg tablet   No No   Sig: Take 3 tablets for 3 days then 2 tablets for 3 days then 1 tablet for 3 days   rosuvastatin (CRESTOR) 10 MG tablet   No No   Sig: TAKE 1 TABLET BY MOUTH EVERY DAY   sertraline (ZOLOFT) 100 mg tablet   No No   Sig: TAKE 1 TABLET BY MOUTH EVERY DAY      Facility-Administered Medications: None       Past Medical History:   Diagnosis Date   • Arthritis    • Colon polyp    • CRPS (complex regional pain syndrome)    • DVT (deep venous thrombosis) ([facility])    • GERD (gastroesophageal reflux disease)    • H/O total hysterectomy    • Inflammatory spondylopathies (HCC)    • Nonmelanoma skin cancer     LAST ASSESSED: [id number]   • PE (pulmonary thromboembolism) ([facility])    • PONV (postoperative nausea and vomiting)    • Squamous cell carcinoma    • Squamous cell skin cancer 2020    nose    • Urinary incontinence    • Uterine leiomyoma     LAST ASSESSED: [id number]       Past Surgical History:   Procedure Laterality Date   • ANKLE SURGERY     • BACK SURGERY  2018   • BELOW KNEE LEG AMPUTATION      LAST ASSESSED: [id number]   • BUNIONECTOMY Right    •  SECTION     • HYSTERECTOMY      LAST ASSESSED: [id number]   • OTHER SURGICAL HISTORY      SPINAL STEROTAXIS STIMULATION OF CORD; LAST ASSESSED: [id number]   • MI COLONOSCOPY FLX DX W/COLLJ SPEC WHEN PFRMD N/A 2018    Procedure: COLONOSCOPY;  Surgeon: Steffani Simmonds, MD;  Location: [facility]; Service: Gastroenterology   • MI ESOPHAGOGASTRODUODENOSCOPY TRANSORAL DIAGNOSTIC N/A 2018    Procedure: ESOPHAGOGASTRODUODENOSCOPY (EGD); Surgeon: Steffani Simmonds, MD;  Location: [facility]; Service: Gastroenterology   • MI LAPAROSCOPY W/RMVL ADNEXAL STRUCTURES N/A 11/10/2017    Procedure: LAPAROSCOPIC REMOVAL OF BILATERAL OVARIES;  LYSIS OF ADHESIONS;  Surgeon: Ismael Reeves MD;  Location: [facility];  Service: Gynecology   • SINUS SURGERY         Family History   Problem Relation Age of Onset   • Hypertension Mother    • Heart disease Mother    • Coronary artery disease Mother         Due to calcified coronary lesion    • Diabetes Father         Type 2, controlled with neuropathy    • Vitiligo Brother    • Breast cancer Cousin 28   • Breast cancer Other    • Vitiligo Daughter      I have reviewed and agree with the history as documented  E-Cigarette/Vaping   • E-Cigarette Use Never User      E-Cigarette/Vaping Substances   • Nicotine No    • THC No    • CBD No    • Flavoring No    • Other No    • Unknown No      Social History     Tobacco Use   • Smoking status: Never   • Smokeless tobacco: Never   Vaping Use   • Vaping Use: Never used   Substance Use Topics   • Alcohol use: No   • Drug use: No       Review of Systems   Gastrointestinal: Positive for abdominal pain and nausea  All other systems reviewed and are negative  Physical Exam  Physical Exam  Vitals and nursing note reviewed  Constitutional:       General: She is not in acute distress  Appearance: She is well-developed  HENT:      Head: Normocephalic and atraumatic  Eyes:      Extraocular Movements: Extraocular movements intact  Conjunctiva/sclera: Conjunctivae normal       Pupils: Pupils are equal, round, and reactive to light  Cardiovascular:      Rate and Rhythm: Normal rate and regular rhythm  Heart sounds: No murmur heard  Pulmonary:      Effort: Pulmonary effort is normal  No respiratory distress  Breath sounds: Normal breath sounds  Abdominal:      Palpations: Abdomen is soft  There is no mass  Tenderness: There is abdominal tenderness (TTP to the RUQ)  There is no guarding or rebound  Musculoskeletal:         General: No swelling  Normal range of motion  Cervical back: Neck supple  Right lower leg: No tenderness  Left lower leg: No tenderness  Skin:     General: Skin is warm and dry  Capillary Refill: Capillary refill takes less than 2 seconds  Neurological:      General: No focal deficit present  Mental Status: She is alert and oriented to person, place, and time     Psychiatric:         Mood and Affect: Mood normal          Vital Signs  ED Triage Vitals   Temperature Pulse Respirations Blood Pressure SpO2   12/25/22 2347 12/25/22 2209 12/25/22 2209 12/25/22 2209 12/25/22 2209   98 4 °F (36 9 °C) 87 18 144/71 99 %      Temp Source Heart Rate Source Patient Position - Orthostatic VS BP Location FiO2 (%)   12/25/22 2347 12/25/22 2209 12/26/22 0130 12/26/22 0130 --   Oral Monitor Sitting Right arm       Pain Score       12/26/22 0130       No Pain           Vitals:    12/26/22 0445 12/26/22 0500 12/26/22 0700 12/26/22 0800   BP: 127/74 134/74 141/78 108/70   Pulse: 72 70 72 64   Patient Position - Orthostatic VS:   Lying Lying         Visual Acuity      ED Medications  Medications   sodium chloride 0 9 % bolus 1,000 mL (0 mL Intravenous Stopped 12/26/22 0146) ketorolac (TORADOL) injection 15 mg (15 mg Intravenous Given 12/25/22 2247)   ondansetron (ZOFRAN) injection 4 mg (4 mg Intravenous Given 12/25/22 2247)   iohexol (OMNIPAQUE) 350 MG/ML injection (SINGLE-DOSE) 100 mL (100 mL Intravenous Given 12/26/22 0039)       Diagnostic Studies  Results Reviewed     Procedure Component Value Units Date/Time    HS Troponin I 2hr [[id number]]  (Normal) Collected: 12/26/22 0121    Lab Status: Final result Specimen: Blood from Arm, Right Updated: 12/26/22 0157     hs TnI 2hr 2 ng/L      Delta 2hr hsTnI -1 ng/L     HS Troponin 0hr (reflex protocol) [[id number]]  (Normal) Collected: 12/25/22 2236    Lab Status: Final result Specimen: Blood from Arm, Right Updated: 12/26/22 0058     hs TnI 0hr 3 ng/L     Comprehensive metabolic panel [[id number]] Collected: 12/25/22 2236    Lab Status: Final result Specimen: Blood from Arm, Right Updated: 12/26/22 0037     Sodium 142 mmol/L      Potassium 3 9 mmol/L      Chloride 107 mmol/L      CO2 27 mmol/L      ANION GAP 8 mmol/L      BUN 16 mg/dL      Creatinine 0 84 mg/dL      Glucose 104 mg/dL      Calcium 8 9 mg/dL      AST 30 U/L      ALT 40 U/L      Alkaline Phosphatase 81 U/L      Total Protein 7 8 g/dL      Albumin 3 7 g/dL      Total Bilirubin 0 27 mg/dL      eGFR 79 ml/min/1 73sq m     Narrative:      Mickie guidelines for Chronic Kidney Disease (CKD):   •  Stage 1 with normal or high GFR (GFR > 90 mL/min/1 73 square meters)  •  Stage 2 Mild CKD (GFR = 60-89 mL/min/1 73 square meters)  •  Stage 3A Moderate CKD (GFR = 45-59 mL/min/1 73 square meters)  •  Stage 3B Moderate CKD (GFR = 30-44 mL/min/1 73 square meters)  •  Stage 4 Severe CKD (GFR = 15-29 mL/min/1 73 square meters)  •  Stage 5 End Stage CKD (GFR <15 mL/min/1 73 square meters)  Note: GFR calculation is accurate only with a steady state creatinine    Lipase [[id number]]  (Normal) Collected: 12/25/22 0374    Lab Status: Final result Specimen: Blood from Arm, Right Updated: 12/26/22 0037     Lipase 222 u/L     CBC and differential [[id number]]  (Abnormal) Collected: 12/25/22 2236    Lab Status: Final result Specimen: Blood from Arm, Right Updated: 12/26/22 0017     WBC 7 50 Thousand/uL      RBC 4 61 Million/uL      Hemoglobin 11 7 g/dL      Hematocrit 36 6 %      MCV 79 fL      MCH 25 4 pg      MCHC 32 0 g/dL      RDW 17 1 %      MPV 11 2 fL      Platelets 890 Thousands/uL      nRBC 0 /100 WBCs      Neutrophils Relative 52 %      Immat GRANS % 0 %      Lymphocytes Relative 35 %      Monocytes Relative 9 %      Eosinophils Relative 3 %      Basophils Relative 1 %      Neutrophils Absolute 3 84 Thousands/µL      Immature Grans Absolute 0 02 Thousand/uL      Lymphocytes Absolute 2 65 Thousands/µL      Monocytes Absolute 0 69 Thousand/µL      Eosinophils Absolute 0 23 Thousand/µL      Basophils Absolute 0 07 Thousands/µL     Lactic acid [[id number]]  (Normal) Collected: 12/25/22 2257    Lab Status: Final result Specimen: Blood from Arm, Right Updated: 12/25/22 2329     LACTIC ACID 0 7 mmol/L     Narrative:      Result may be elevated if tourniquet was used during collection  D-Dimer [[id number]]  (Abnormal) Collected: 12/25/22 2257    Lab Status: Final result Specimen: Blood from Arm, Right Updated: 12/25/22 2329     D-Dimer, Quant 1 24 ug/ml FEU     Narrative: In the evaluation for possible pulmonary embolism, in the appropriate (Well's Score of 4 or less) patient, the age adjusted d-dimer cutoff for this patient can be calculated as:    Age x 0 01 (in ug/mL) for Age-adjusted D-dimer exclusion threshold for a patient over 50 years      FLU/RSV/COVID - if FLU/RSV clinically relevant [[id number]]  (Normal) Collected: 12/25/22 2241    Lab Status: Final result Specimen: Nasopharyngeal Swab Updated: 12/25/22 2324     SARS-CoV-2 Negative     INFLUENZA A PCR Negative     INFLUENZA B PCR Negative     RSV PCR Negative    Narrative:      FOR PEDIATRIC PATIENTS - copy/paste COVID Guidelines URL to browser: [email or web address]    SARS-CoV-2 assay is a Nucleic Acid Amplification assay intended for the  qualitative detection of nucleic acid from SARS-CoV-2 in nasopharyngeal  swabs  Results are for the presumptive identification of SARS-CoV-2 RNA  Positive results are indicative of infection with SARS-CoV-2, the virus  causing COVID-19, but do not rule out bacterial infection or co-infection  with other viruses  Laboratories within the [place] and its  territories are required to report all positive results to the appropriate  public health authorities  Negative results do not preclude SARS-CoV-2  infection and should not be used as the sole basis for treatment or other  patient management decisions  Negative results must be combined with  clinical observations, patient history, and epidemiological information  This test has not been FDA cleared or approved  This test has been authorized by FDA under an Emergency Use Authorization  (EUA)  This test is only authorized for the duration of time the  declaration that circumstances exist justifying the authorization of the  emergency use of an in vitro diagnostic tests for detection of SARS-CoV-2  virus and/or diagnosis of COVID-19 infection under section 564(b)(1) of  the Act, 21 U  S C  124IYF-7(U)(7), unless the authorization is terminated  or revoked sooner  The test has been validated but independent review by FDA  and CLIA is pending  Test performed using [employer] GeneXpert: This RT-PCR assay targets N2,  a region unique to SARS-CoV-2  A conserved region in the E-gene was chosen  for pan-Sarbecovirus detection which includes SARS-CoV-2  According to CMS-2020-01-R, this platform meets the definition of high-Access Intelligence technology                   RADIOLOGY RESULTS   Final Result by  (12/28 1123)       right upper quadrant   Final Result by Vijaya Driver DO (12/26 7404)      Fatty infiltration of the liver is suspected  In the setting of abdominal pain and/or elevated liver function tests, consider steatohepatitis  0 7 cm echogenic focus seen along the posterior gallbladder wall, nonmobile, could represent adherent stone versus gallbladder polyp  Gallbladder wall thickening, as described  Differential considerations include gallbladder wall edema or acute versus    chronic inflammation  Correlation with the patient's symptoms and laboratory values recommended  Other findings as above  Workstation performed: [id number]         PE Study with CT Abdomen and Pelvis with contrast   Final Result by Quoc Mcbride DO (12/26 0330)      No pulmonary embolism is seen  Scattered air trapping in the bilateral lungs but no acute pulmonary process is seen  Tiny gallstone in the region of the gallbladder neck/cystic duct  There is some gallbladder wall thickening suspected, nonspecific but consider acute cholecystitis in the appropriate clinical setting  Other findings as above  Findings are consistent with the preliminary report from [facility] which was provided shortly after completion of the exam  The additional finding of  possible acute cholecystitis was communicated to [address] by Dr Orlin Jackson on    12/26/2022 at 3:23 AM          Workstation performed: [id number]         XR chest 1 view portable   Final Result by Ra Concepcion MD (12/26 7974)      No acute cardiopulmonary disease  Findings are stable            Workstation performed: [id number]                    Procedures  Procedures         ED Course  ED Course as of 12/28/22 9144   [facility] Dec 26, 2022   3493 Discussed with general surgery regarding findings of potential acute cholecystitis  Dr Kim Martinez recommended ordering ultrasound of right upper quadrant and discussing when this has resulted     0740 Patient signed out to [facility] CARTER                               SBIRT 22yo+    Flowsheet Row Most Recent Value   SBIRT (23 yo +)    In order to provide better care to our patients, we are screening all of our patients for alcohol and drug use  Would it be okay to ask you these screening questions? Yes Filed at: 12/26/2022 0901   Initial Alcohol Screen: US AUDIT-C     1  How often do you have a drink containing alcohol? 0 Filed at: 12/26/2022 0901   2  How many drinks containing alcohol do you have on a typical day you are drinking? 0 Filed at: 12/26/2022 0901   3a  Male UNDER 65: How often do you have five or more drinks on one occasion? 0 Filed at: 12/26/2022 0901   3b  FEMALE Any Age, or MALE 65+: How often do you have 4 or more drinks on one occassion? 0 Filed at: 12/26/2022 0901   Audit-C Score 0 Filed at: 12/26/2022 0901   JOEL: How many times in the past year have you    Used an illegal drug or used a prescription medication for non-medical reasons? Never Filed at: 12/26/2022 0901                    MDM  Number of Diagnoses or Management Options  Diagnosis management comments: Patient presenting with right upper quadrant abdominal pain and tenderness to palpation radiating to the left shoulder blade  Concerning for cholecystitis and other differential   Patient vital signs stable, asking nurse to obtain temperature now  Patient was tachycardic, 99% on room air, normal blood pressure  Secondary to history of pulmonary embolism and recent outpatient surgical procedure and off of Xarelto for about 3 days last week, will repeat D-dimer evaluation to further risk stratify for pulmonary embolism  Will obtain imaging of gallbladder        Disposition  Final diagnoses:   Right upper quadrant abdominal pain     Time reflects when diagnosis was documented in both MDM as applicable and the Disposition within this note     Time User Action Codes Description Comment    12/26/2022  7:40 AM Luis Carranza Add [R10 11] Right upper quadrant abdominal pain       ED Disposition     ED Disposition   Discharge    Condition   Stable    Date/Time   Mon Dec 26, 2022  9:33 AM    Comment   Cher Shen discharge to home/self care                 Follow-up Information     Follow up With Specialties Details Why Contact Info Additional Information    Carolyn Freire MD General Surgery Follow up in 2 week(s)  [address]  [place] [facility] Emergency Department Emergency Medicine  If symptoms worsen [address] Emergency Department, [address] [place] [address], [phone]          Discharge Medication List as of 12/26/2022  9:34 AM      CONTINUE these medications which have NOT CHANGED    Details   b complex vitamins capsule Take 1 capsule by mouth daily, Historical Med      buPROPion (WELLBUTRIN XL) 300 mg 24 hr tablet TAKE 1 TABLET BY MOUTH EVERY DAY IN THE MORNING, Normal      calcium carbonate (OS-APRYL) 600 MG tablet Take 600 mg by mouth 2 (two) times a day with meals, Historical Med      cevimeline (EVOXAC) 30 MG capsule Take 30 mg by mouth 3 (three) times a day, Starting Wed 1/19/2022, Historical Med      Cholecalciferol (VITAMIN D3) 5000 units CAPS Take by mouth, Historical Med      desonide (DESOWEN) 0 05 % cream Apply topically 2 (two) times a day, Starting Wed 2/27/2019, Normal      ferrous sulfate 324 (65 Fe) mg Take 1 tablet (324 mg total) by mouth every other day, Starting Thu 11/7/2019, No Print      folic acid (FOLVITE) 1 mg tablet Take 3 tablets by mouth daily  , Historical Med      glucosamine-chondroitin 500-400 MG tablet Take 1 tablet by mouth 3 (three) times a day, Historical Med      HYDROcodone-acetaminophen (NORCO) 5-325 mg per tablet Take 1 tablet by mouth every 8 (eight) hours as needed, Starting Thu 9/9/2021, Historical Med      inFLIXimab (REMICADE IV) Infuse into a venous catheter Every 6 weeks  , Historical Med      LORazepam (ATIVAN) 0 5 mg tablet Take 1 tablet (0 5 mg total) by mouth daily at bedtime, Starting Fri 6/24/2022, Normal      methotrexate 2 5 mg tablet Take 2 5 mg by mouth once a week 8 pills weekly, Historical Med      mometasone (NASONEX) 50 mcg/act nasal spray SPRAY 2 SPRAYS INTO EACH NOSTRIL EVERY DAY, Normal      neomycin-polymyxin-hydrocortisone (CORTISPORIN) otic solution Administer 3 drops to the right ear every 8 (eight) hours, Starting Mon 2/14/2022, Normal      omeprazole (PriLOSEC) 40 MG capsule TAKE 1 CAPSULE (40 MG TOTAL) BY MOUTH DAILY  , Starting Fri 12/16/2022, Normal      predniSONE 10 mg tablet Take 3 tablets for 3 days then 2 tablets for 3 days then 1 tablet for 3 days, Normal      Probiotic Product (PROBIOTIC-10) CAPS Take by mouth, Historical Med      rosuvastatin (CRESTOR) 10 MG tablet TAKE 1 TABLET BY MOUTH EVERY DAY, Normal      sertraline (ZOLOFT) 100 mg tablet TAKE 1 TABLET BY MOUTH EVERY DAY, Normal      VITAMIN B COMPLEX-C PO vitamin B complex   Take 1 tablet by mouth once daily  , Historical Med      Xarelto 20 MG tablet TAKE 1 TABLET BY MOUTH EVERY DAY, Normal             No discharge procedures on file      PDMP Review       Value Time User    PDMP Reviewed  Yes 6/24/2022 11:58 AM Thor Austin MD          ED Provider  Electronically Signed by           Shiva Meyer PA-C  12/28/22 8782

## 2023-01-13 ENCOUNTER — OFFICE VISIT (OUTPATIENT)
Dept: SURGERY | Facility: CLINIC | Age: 53
End: 2023-01-13

## 2023-01-13 VITALS
BODY MASS INDEX: 37.92 KG/M2 | DIASTOLIC BLOOD PRESSURE: 86 MMHG | WEIGHT: 214 LBS | HEIGHT: 63 IN | SYSTOLIC BLOOD PRESSURE: 124 MMHG | HEART RATE: 72 BPM

## 2023-01-13 DIAGNOSIS — E66.01 OBESITY, MORBID (HCC): ICD-10-CM

## 2023-01-13 DIAGNOSIS — E86.0 DEHYDRATION: ICD-10-CM

## 2023-01-13 DIAGNOSIS — K81.1 CHRONIC CHOLECYSTITIS: Primary | ICD-10-CM

## 2023-01-13 RX ORDER — SODIUM CHLORIDE, SODIUM LACTATE, POTASSIUM CHLORIDE, CALCIUM CHLORIDE 600; 310; 30; 20 MG/100ML; MG/100ML; MG/100ML; MG/100ML
125 INJECTION, SOLUTION INTRAVENOUS
OUTPATIENT
Start: 2023-01-14 | End: 2023-01-15

## 2023-01-13 RX ORDER — HEPARIN SODIUM 5000 [USP'U]/ML
5000 INJECTION, SOLUTION INTRAVENOUS; SUBCUTANEOUS ONCE
OUTPATIENT
Start: 2023-01-13 | End: 2023-01-13

## 2023-01-13 NOTE — PROGRESS NOTES
Follow Up - General Surgery   Augusto Bishop 45 y o  female MRN: [id number]  Unit/Bed#:  Encounter: [id number]    Assessment/Plan     Assessment:  Symptomatic chronic cholecystitis  Morbid obesity  History of DVT  History of melanoma  Squamous cell skin cancer  History of nerve spinal stimulator  Plan:  None of the symptoms I advised the patient to undergo laparoscopic ostectomy, possible open  I discussed the operative procedure, risk, benefits, alternatives and possible complications, she understood and agreed to proceed  History of Present Illness     HPI:  Augusto Bishop is a 45 y o  female who presents to my office for follow-up  The patient was originally seen in the emergency room on December 25 due to epigastric and right upper quad abdominal pain radiated to her back  She denies having any nausea, vomiting, diarrhea, constipation or any other constitutional symptoms  Patient was seen in the emergency room and cardiac work-up was negative  Ultrasound of the gallbladder revealed thickened wall with 0 7 cm echogenic focus seen along the posterior gallbladder wall, nonmobile, could represent adherent stone versus gallbladder polyp  During the emergency room visit her symptoms resolved and patient elected to have surgery done as an outpatient  Patient was discharged home and seen in the office to schedule surgery  She has not have any significant abdominal pain since emergency room visit  Patient has been watching what she is eating  Review of Systems  The rest of the review of system total of 10 were negative except for the HPI      Historical Information   Past Medical History:   Diagnosis Date   • Arthritis    • Colon polyp    • CRPS (complex regional pain syndrome)    • DVT (deep venous thrombosis) (HCC)    • GERD (gastroesophageal reflux disease)    • H/O total hysterectomy    • [facility]ies [facility]    • Nonmelanoma skin cancer     LAST ASSESSED: [id number]   • PE (pulmonary thromboembolism) ([facility] )    • PONV (postoperative nausea and vomiting)    • Squamous cell carcinoma    • Squamous cell skin cancer     nose    • Urinary incontinence    • Uterine leiomyoma     LAST ASSESSED: [id number]     Past Surgical History:   Procedure Laterality Date   • ANKLE SURGERY     • BACK SURGERY  2018   • BELOW KNEE LEG AMPUTATION      LAST ASSESSED: [id number]   • BUNIONECTOMY Right    •  SECTION     • HYSTERECTOMY      LAST ASSESSED: [id number]   • OTHER SURGICAL HISTORY      SPINAL STEROTAXIS STIMULATION OF CORD; LAST ASSESSED: [id number]   • NM COLONOSCOPY FLX DX W/COLLJ SPEC WHEN PFRMD N/A 2018    Procedure: COLONOSCOPY;  Surgeon: Jackelyn Kirby MD;  Location: [facility]; Service: Gastroenterology   • NM ESOPHAGOGASTRODUODENOSCOPY TRANSORAL DIAGNOSTIC N/A 2018    Procedure: ESOPHAGOGASTRODUODENOSCOPY (EGD); Surgeon: Jackelyn Kirby MD;  Location: [facility];   Service: Gastroenterology   • NM LAPAROSCOPY W/RMVL ADNEXAL STRUCTURES N/A 11/10/2017    Procedure: LAPAROSCOPIC REMOVAL OF BILATERAL OVARIES;  LYSIS OF ADHESIONS;  Surgeon: Chanel Jimenez MD;  Location: [facility];  Service: Gynecology   • SINUS SURGERY       Social History   Social History     Substance and Sexual Activity   Alcohol Use No     Social History     Substance and Sexual Activity   Drug Use No     Social History     Tobacco Use   Smoking Status Never   Smokeless Tobacco Never     Family History: non-contributory    Meds/Allergies   all medications and allergies reviewed     Current Outpatient Medications:   •  b complex vitamins capsule, Take 1 capsule by mouth daily, Disp: , Rfl:   •  buPROPion (WELLBUTRIN XL) 300 mg 24 hr tablet, TAKE 1 TABLET BY MOUTH EVERY DAY IN THE MORNING, Disp: 90 tablet, Rfl: 3  •  calcium carbonate (OS-APRYL) 600 MG tablet, Take 600 mg by mouth 2 (two) times a day with meals, Disp: , Rfl:   •  cevimeline (EVOXAC) 30 MG capsule, Take 30 mg by mouth 3 (three) times a day, Disp: , Rfl:   •  Cholecalciferol (VITAMIN D3) 5000 units CAPS, Take by mouth, Disp: , Rfl:   •  desonide (DESOWEN) 0 05 % cream, Apply topically 2 (two) times a day (Patient taking differently: Apply topically as needed), Disp: 60 g, Rfl: 3  •  ferrous sulfate 324 (65 Fe) mg, Take 1 tablet (324 mg total) by mouth every other day, Disp: , Rfl:   •  folic acid (FOLVITE) 1 mg tablet, Take 3 tablets by mouth daily  , Disp: , Rfl:   •  glucosamine-chondroitin 500-400 MG tablet, Take 1 tablet by mouth 3 (three) times a day, Disp: , Rfl:   •  HYDROcodone-acetaminophen (NORCO) 5-325 mg per tablet, Take 1 tablet by mouth every 8 (eight) hours as needed, Disp: , Rfl:   •  inFLIXimab (REMICADE IV), Infuse into a venous catheter Every 6 weeks  , Disp: , Rfl:   •  LORazepam (ATIVAN) 0 5 mg tablet, Take 1 tablet (0 5 mg total) by mouth daily at bedtime, Disp: 30 tablet, Rfl: 0  •  methotrexate 2 5 mg tablet, Take 2 5 mg by mouth once a week 8 pills weekly, Disp: , Rfl:   •  omeprazole (PriLOSEC) 40 MG capsule, TAKE 1 CAPSULE (40 MG TOTAL) BY MOUTH DAILY  , Disp: 90 capsule, Rfl: 0  •  predniSONE 10 mg tablet, Take 3 tablets for 3 days then 2 tablets for 3 days then 1 tablet for 3 days, Disp: 18 tablet, Rfl: 0  •  Probiotic Product (PROBIOTIC-10) CAPS, Take by mouth, Disp: , Rfl:   •  rosuvastatin (CRESTOR) 10 MG tablet, TAKE 1 TABLET BY MOUTH EVERY DAY, Disp: 90 tablet, Rfl: 1  •  sertraline (ZOLOFT) 100 mg tablet, TAKE 1 TABLET BY MOUTH EVERY DAY, Disp: 90 tablet, Rfl: 3  •  VITAMIN B COMPLEX-C PO, vitamin B complex  Take 1 tablet by mouth once daily  , Disp: , Rfl:   •  Xarelto 20 MG tablet, TAKE 1 TABLET BY MOUTH EVERY DAY, Disp: 90 tablet, Rfl: 3  •  mometasone (NASONEX) 50 mcg/act nasal spray, SPRAY 2 SPRAYS INTO EACH NOSTRIL EVERY DAY, Disp: 17 g, Rfl: 2  •  neomycin-polymyxin-hydrocortisone (CORTISPORIN) otic solution, Administer 3 drops to the right ear every 8 (eight) hours (Patient not taking: Reported on 1/13/2023), Disp: 10 mL, Rfl: 0  Allergies   Allergen Reactions   • Leflunomide Shortness Of Breath and Palpitations   • Penicillins Hives   • Tessalon Perles [Benzonatate] Facial Swelling   • Ciprofloxacin Rash   • Codeine Itching and Rash   • Morphine Itching and Rash       Objective     Current Vitals:   Blood Pressure: 124/86 (01/13/23 1140)  Pulse: 72 (01/13/23 1140)  Height: 5' 3" (160 cm) (01/13/23 1140)  Weight - Scale: 97 1 kg (214 lb) (01/13/23 1140)    Physical Exam  Vitals and nursing note reviewed  Constitutional:       General: She is not in acute distress  Appearance: She is obese  Cardiovascular:      Rate and Rhythm: Normal rate and regular rhythm  Heart sounds: No murmur heard  Pulmonary:      Effort: No respiratory distress  Breath sounds: Normal breath sounds  Abdominal:      Comments: Abdomen is obese, soft, nondistended and nontender  Surgical scar from tummy tuck noted  There is no obvious visceromegaly or mass palpable  Skin:     General: Skin is warm  Coloration: Skin is not jaundiced  Findings: No erythema or rash  Neurological:      Mental Status: She is alert and oriented to person, place, and time  Cranial Nerves: No cranial nerve deficit  Psychiatric:         Mood and Affect: Mood normal          Behavior: Behavior normal        Imaging: I have personally reviewed pertinent reports  and I have personally reviewed pertinent films in PACS  Procedure: XR chest 1 view portable    Result Date: 12/26/2022  Narrative: CHEST INDICATION:   SOB  COMPARISON:  11/24/2018 EXAM PERFORMED/VIEWS:  XR CHEST PORTABLE Single view FINDINGS: Cardiomediastinal silhouette appears unremarkable  The lungs are clear  No pneumothorax or pleural effusion  Osseous structures appear within normal limits for patient age  Impression: No acute cardiopulmonary disease   Findings are stable Workstation performed: [id number]     Procedure: PE Study with CT Abdomen and Pelvis with contrast    Result Date: 12/26/2022  Narrative: CT PULMONARY ANGIOGRAM OF THE CHEST AND CT ABDOMEN AND PELVIS WITH INTRAVENOUS CONTRAST INDICATION:   Chest pain, hx of PE RUQ TTP  On xarelto with recent surgical procedure  +dimer  COMPARISON:  None  TECHNIQUE:  CT examination of the chest, abdomen and pelvis was performed  Thin section CT angiographic technique was used in the chest in order to evaluate for pulmonary embolus and coronal 3D MIP postprocessing was performed on the acquisition scanner  Axial, sagittal, and coronal 2D reformatted images were created from the source data and submitted for interpretation  Radiation dose length product (DLP) for this visit:  021 329 93 28 mGy-cm   This examination, like all CT scans performed in the [facility], was performed utilizing techniques to minimize radiation dose exposure, including the use of iterative reconstruction and automated exposure control  IV Contrast:  100 mL of iohexol (OMNIPAQUE) Enteric Contrast:  Enteric contrast was not administered  FINDINGS: CHEST PULMONARY ARTERIAL TREE:  No pulmonary embolus is seen  LUNGS:  Scattered air trapping in the bilateral lungs which otherwise appear grossly clear  PLEURA:  Unremarkable  HEART/AORTA:  Heart is unremarkable for patient's age  No thoracic aortic aneurysm  MEDIASTINUM AND NATHAN:  Unremarkable  CHEST WALL AND LOWER NECK:  Unremarkable  ABDOMEN LIVER/BILIARY TREE:  Unremarkable  GALLBLADDER:  Fundal adenomyomatosis suspected  Tiny gallstone in the region of the gallbladder neck/cystic duct  There is some gallbladder wall thickening suspected, nonspecific but consider acute cholecystitis in the appropriate clinical setting  SPLEEN:  Unremarkable  PANCREAS:  Unremarkable  ADRENAL GLANDS:  Unremarkable  KIDNEYS/URETERS:  Unremarkable  No hydronephrosis  STOMACH AND BOWEL:  Grossly unremarkable APPENDIX:  No findings to suggest appendicitis  ABDOMINOPELVIC CAVITY:  No ascites    No pneumoperitoneum  No lymphadenopathy  VESSELS:  Mild atherosclerosis; no aortic aneurysm PELVIS REPRODUCTIVE ORGANS:  Uterus not well seen, suspect prior hysterectomy  URINARY BLADDER:  Grossly unremarkable  ABDOMINAL WALL/INGUINAL REGIONS:  Postsurgical changes in the anterior lower abdominal /pelvic wall  No discrete focal collection identified  OSSEOUS STRUCTURES:  Fusion of the L4-S1 levels with anterior and posterior metallic fixation  Visualized hardware appears intact  Left sided spinal stimulation device in the subcutaneous tissues in the left gluteal region  Stimulator lead extends into the posterior thoracic spinal canal   Visualized bones appear intact  Impression: No pulmonary embolism is seen  Scattered air trapping in the bilateral lungs but no acute pulmonary process is seen  Tiny gallstone in the region of the gallbladder neck/cystic duct  There is some gallbladder wall thickening suspected, nonspecific but consider acute cholecystitis in the appropriate clinical setting  Other findings as above  Findings are consistent with the preliminary report from [facility] which was provided shortly after completion of the exam  The additional finding of  possible acute cholecystitis was communicated to [address] by Dr Hilda Matthews on 12/26/2022 at 3:23 AM  Workstation performed: [id number]     Procedure: US right upper quadrant    Result Date: 12/26/2022  Narrative: RIGHT UPPER QUADRANT ULTRASOUND INDICATION:     RUQ abdominal TTP  William Goody COMPARISON:  CTA chest, abdomen, and pelvis, dated the same TECHNIQUE:   Real-time ultrasound of the right upper quadrant was performed with a curvilinear transducer with both volumetric sweeps and still imaging techniques  FINDINGS: PANCREAS:  Visualized portions of the pancreas are within normal limits  AORTA AND IVC:  Visualized portions are normal for patient age  LIVER: Size:  The liver measures 17 3 cm in the midclavicular line   Contour:  Surface contour is smooth  Parenchyma:  Heterogeneous hyperechoic echotexture the liver, suspicious for fatty infiltration  No liver mass identified  Limited imaging of the main portal vein shows it to be patent and hepatopetal   BILIARY: Gallbladder wall thickening, gallbladder wall measures approximately 5 mm in thickness  Mildly distended  0 7 cm echogenic focus seen along the posterior wall, nonmobile, could represent adherent stone versus gallbladder polyp  No sonographic Rush sign elicited during real-time examination  Otherwise grossly unremarkable No intrahepatic biliary dilatation  CBD measures 5 mm  No choledocholithiasis  KIDNEY: Right kidney measures 11 5 x 3 9 x 4 3 cm  Volume 101 mL Kidney within normal limits  ASCITES:   None  Impression: Fatty infiltration of the liver is suspected  In the setting of abdominal pain and/or elevated liver function tests, consider steatohepatitis  0 7 cm echogenic focus seen along the posterior gallbladder wall, nonmobile, could represent adherent stone versus gallbladder polyp  Gallbladder wall thickening, as described  Differential considerations include gallbladder wall edema or acute versus chronic inflammation  Correlation with the patient's symptoms and laboratory values recommended  Other findings as above   Workstation performed: [id number]

## 2023-01-13 NOTE — H&P (VIEW-ONLY)
Follow Up - General Surgery   Ashley Montero 45 y o  female MRN: [id number]  Unit/Bed#:  Encounter: [id number]    Assessment/Plan     Assessment:  Symptomatic chronic cholecystitis  Morbid obesity  History of DVT  History of melanoma  Squamous cell skin cancer  History of nerve spinal stimulator  Plan:  None of the symptoms I advised the patient to undergo laparoscopic ostectomy, possible open  I discussed the operative procedure, risk, benefits, alternatives and possible complications, she understood and agreed to proceed  History of Present Illness     HPI:  Ashley Montero is a 45 y o  female who presents to my office for follow-up  The patient was originally seen in the emergency room on December 25 due to epigastric and right upper quad abdominal pain radiated to her back  She denies having any nausea, vomiting, diarrhea, constipation or any other constitutional symptoms  Patient was seen in the emergency room and cardiac work-up was negative  Ultrasound of the gallbladder revealed thickened wall with 0 7 cm echogenic focus seen along the posterior gallbladder wall, nonmobile, could represent adherent stone versus gallbladder polyp  During the emergency room visit her symptoms resolved and patient elected to have surgery done as an outpatient  Patient was discharged home and seen in the office to schedule surgery  She has not have any significant abdominal pain since emergency room visit  Patient has been watching what she is eating  Review of Systems  The rest of the review of system total of 10 were negative except for the HPI      Historical Information   Past Medical History:   Diagnosis Date   • Arthritis    • Colon polyp    • CRPS (complex regional pain syndrome)    • DVT (deep venous thrombosis) (HCC)    • GERD (gastroesophageal reflux disease)    • H/O total hysterectomy    • Inflammatory spondylopathies ([facility] )    • Nonmelanoma skin cancer     LAST ASSESSED: [id number]   • PE (pulmonary thromboembolism) ([facility] )    • PONV (postoperative nausea and vomiting)    • Squamous cell carcinoma    • Squamous cell skin cancer     nose    • Urinary incontinence    • Uterine leiomyoma     LAST ASSESSED: [id number]     Past Surgical History:   Procedure Laterality Date   • ANKLE SURGERY     • BACK SURGERY  2018   • BELOW KNEE LEG AMPUTATION      LAST ASSESSED: [id number]   • BUNIONECTOMY Right    •  SECTION     • HYSTERECTOMY      LAST ASSESSED: [id number]   • OTHER SURGICAL HISTORY      SPINAL STEROTAXIS STIMULATION OF CORD; LAST ASSESSED: [id number]   • KS COLONOSCOPY FLX DX W/COLLJ SPEC WHEN PFRMD N/A 2018    Procedure: COLONOSCOPY;  Surgeon: Severa Chase, MD;  Location: [facility]; Service: Gastroenterology   • KS ESOPHAGOGASTRODUODENOSCOPY TRANSORAL DIAGNOSTIC N/A 2018    Procedure: ESOPHAGOGASTRODUODENOSCOPY (EGD); Surgeon: Severa Chase, MD;  Location: [facility];   Service: Gastroenterology   • KS LAPAROSCOPY W/RMVL ADNEXAL STRUCTURES N/A 11/10/2017    Procedure: LAPAROSCOPIC REMOVAL OF BILATERAL OVARIES;  LYSIS OF ADHESIONS;  Surgeon: Emery Dubose MD;  Location: [facility];  Service: Gynecology   • SINUS SURGERY       Social History   Social History     Substance and Sexual Activity   Alcohol Use No     Social History     Substance and Sexual Activity   Drug Use No     Social History     Tobacco Use   Smoking Status Never   Smokeless Tobacco Never     Family History: non-contributory    Meds/Allergies   all medications and allergies reviewed     Current Outpatient Medications:   •  b complex vitamins capsule, Take 1 capsule by mouth daily, Disp: , Rfl:   •  buPROPion (WELLBUTRIN XL) 300 mg 24 hr tablet, TAKE 1 TABLET BY MOUTH EVERY DAY IN THE MORNING, Disp: 90 tablet, Rfl: 3  •  calcium carbonate (OS-APRYL) 600 MG tablet, Take 600 mg by mouth 2 (two) times a day with meals, Disp: , Rfl:   •  cevimeline (EVOXAC) 30 MG capsule, Take 30 mg by mouth 3 (three) times a day, Disp: , Rfl:   •  Cholecalciferol (VITAMIN D3) 5000 units CAPS, Take by mouth, Disp: , Rfl:   •  desonide (DESOWEN) 0 05 % cream, Apply topically 2 (two) times a day (Patient taking differently: Apply topically as needed), Disp: 60 g, Rfl: 3  •  ferrous sulfate 324 (65 Fe) mg, Take 1 tablet (324 mg total) by mouth every other day, Disp: , Rfl:   •  folic acid (FOLVITE) 1 mg tablet, Take 3 tablets by mouth daily  , Disp: , Rfl:   •  glucosamine-chondroitin 500-400 MG tablet, Take 1 tablet by mouth 3 (three) times a day, Disp: , Rfl:   •  HYDROcodone-acetaminophen (NORCO) 5-325 mg per tablet, Take 1 tablet by mouth every 8 (eight) hours as needed, Disp: , Rfl:   •  inFLIXimab (REMICADE IV), Infuse into a venous catheter Every 6 weeks  , Disp: , Rfl:   •  LORazepam (ATIVAN) 0 5 mg tablet, Take 1 tablet (0 5 mg total) by mouth daily at bedtime, Disp: 30 tablet, Rfl: 0  •  methotrexate 2 5 mg tablet, Take 2 5 mg by mouth once a week 8 pills weekly, Disp: , Rfl:   •  omeprazole (PriLOSEC) 40 MG capsule, TAKE 1 CAPSULE (40 MG TOTAL) BY MOUTH DAILY  , Disp: 90 capsule, Rfl: 0  •  predniSONE 10 mg tablet, Take 3 tablets for 3 days then 2 tablets for 3 days then 1 tablet for 3 days, Disp: 18 tablet, Rfl: 0  •  Probiotic Product (PROBIOTIC-10) CAPS, Take by mouth, Disp: , Rfl:   •  rosuvastatin (CRESTOR) 10 MG tablet, TAKE 1 TABLET BY MOUTH EVERY DAY, Disp: 90 tablet, Rfl: 1  •  sertraline (ZOLOFT) 100 mg tablet, TAKE 1 TABLET BY MOUTH EVERY DAY, Disp: 90 tablet, Rfl: 3  •  VITAMIN B COMPLEX-C PO, vitamin B complex  Take 1 tablet by mouth once daily  , Disp: , Rfl:   •  Xarelto 20 MG tablet, TAKE 1 TABLET BY MOUTH EVERY DAY, Disp: 90 tablet, Rfl: 3  •  mometasone (NASONEX) 50 mcg/act nasal spray, SPRAY 2 SPRAYS INTO EACH NOSTRIL EVERY DAY, Disp: 17 g, Rfl: 2  •  neomycin-polymyxin-hydrocortisone (CORTISPORIN) otic solution, Administer 3 drops to the right ear every 8 (eight) hours (Patient not taking: Reported on 1/13/2023), Disp: 10 mL, Rfl: 0  Allergies   Allergen Reactions   • Leflunomide Shortness Of Breath and Palpitations   • Penicillins Hives   • Tessalon Perles [Benzonatate] Facial Swelling   • Ciprofloxacin Rash   • Codeine Itching and Rash   • Morphine Itching and Rash       Objective     Current Vitals:   Blood Pressure: 124/86 (01/13/23 1140)  Pulse: 72 (01/13/23 1140)  Height: 5' 3" (160 cm) (01/13/23 1140)  Weight - Scale: 97 1 kg (214 lb) (01/13/23 1140)    Physical Exam  Vitals and nursing note reviewed  Constitutional:       General: She is not in acute distress  Appearance: She is obese  Cardiovascular:      Rate and Rhythm: Normal rate and regular rhythm  Heart sounds: No murmur heard  Pulmonary:      Effort: No respiratory distress  Breath sounds: Normal breath sounds  Abdominal:      Comments: Abdomen is obese, soft, nondistended and nontender  Surgical scar from tummy tuck noted  There is no obvious visceromegaly or mass palpable  Skin:     General: Skin is warm  Coloration: Skin is not jaundiced  Findings: No erythema or rash  Neurological:      Mental Status: She is alert and oriented to person, place, and time  Cranial Nerves: No cranial nerve deficit  Psychiatric:         Mood and Affect: Mood normal          Behavior: Behavior normal        Imaging: I have personally reviewed pertinent reports  and I have personally reviewed pertinent films in PACS  Procedure: XR chest 1 view portable    Result Date: 12/26/2022  Narrative: CHEST INDICATION:   SOB  COMPARISON:  11/24/2018 EXAM PERFORMED/VIEWS:  XR CHEST PORTABLE Single view FINDINGS: Cardiomediastinal silhouette appears unremarkable  The lungs are clear  No pneumothorax or pleural effusion  Osseous structures appear within normal limits for patient age  Impression: No acute cardiopulmonary disease   Findings are stable Workstation performed: [id number]     Procedure: PE Study with CT Abdomen and Pelvis with contrast    Result Date: 12/26/2022  Narrative: CT PULMONARY ANGIOGRAM OF THE CHEST AND CT ABDOMEN AND PELVIS WITH INTRAVENOUS CONTRAST INDICATION:   Chest pain, hx of PE RUQ TTP  On xarelto with recent surgical procedure  +dimer  COMPARISON:  None  TECHNIQUE:  CT examination of the chest, abdomen and pelvis was performed  Thin section CT angiographic technique was used in the chest in order to evaluate for pulmonary embolus and coronal 3D MIP postprocessing was performed on the acquisition scanner  Axial, sagittal, and coronal 2D reformatted images were created from the source data and submitted for interpretation  Radiation dose length product (DLP) for this visit:  021 329 93 28 mGy-cm   This examination, like all CT scans performed in the [facility], was performed utilizing techniques to minimize radiation dose exposure, including the use of iterative reconstruction and automated exposure control  IV Contrast:  100 mL of iohexol (OMNIPAQUE) Enteric Contrast:  Enteric contrast was not administered  FINDINGS: CHEST PULMONARY ARTERIAL TREE:  No pulmonary embolus is seen  LUNGS:  Scattered air trapping in the bilateral lungs which otherwise appear grossly clear  PLEURA:  Unremarkable  HEART/AORTA:  Heart is unremarkable for patient's age  No thoracic aortic aneurysm  MEDIASTINUM AND NATHAN:  Unremarkable  CHEST WALL AND LOWER NECK:  Unremarkable  ABDOMEN LIVER/BILIARY TREE:  Unremarkable  GALLBLADDER:  Fundal adenomyomatosis suspected  Tiny gallstone in the region of the gallbladder neck/cystic duct  There is some gallbladder wall thickening suspected, nonspecific but consider acute cholecystitis in the appropriate clinical setting  SPLEEN:  Unremarkable  PANCREAS:  Unremarkable  ADRENAL GLANDS:  Unremarkable  KIDNEYS/URETERS:  Unremarkable  No hydronephrosis  STOMACH AND BOWEL:  Grossly unremarkable APPENDIX:  No findings to suggest appendicitis  ABDOMINOPELVIC CAVITY:  No ascites    No pneumoperitoneum  No lymphadenopathy  VESSELS:  Mild atherosclerosis; no aortic aneurysm PELVIS REPRODUCTIVE ORGANS:  Uterus not well seen, suspect prior hysterectomy  URINARY BLADDER:  Grossly unremarkable  ABDOMINAL WALL/INGUINAL REGIONS:  Postsurgical changes in the anterior lower abdominal /pelvic wall  No discrete focal collection identified  OSSEOUS STRUCTURES:  Fusion of the L4-S1 levels with anterior and posterior metallic fixation  Visualized hardware appears intact  Left sided spinal stimulation device in the subcutaneous tissues in the left gluteal region  Stimulator lead extends into the posterior thoracic spinal canal   Visualized bones appear intact  Impression: No pulmonary embolism is seen  Scattered air trapping in the bilateral lungs but no acute pulmonary process is seen  Tiny gallstone in the region of the gallbladder neck/cystic duct  There is some gallbladder wall thickening suspected, nonspecific but consider acute cholecystitis in the appropriate clinical setting  Other findings as above  Findings are consistent with the preliminary report from [facility] which was provided shortly after completion of the exam  The additional finding of  possible acute cholecystitis was communicated to [address] by Dr Daniele Roberto on 12/26/2022 at 3:23 AM  Workstation performed: [id number]     Procedure: US right upper quadrant    Result Date: 12/26/2022  Narrative: RIGHT UPPER QUADRANT ULTRASOUND INDICATION:     RUQ abdominal TTP  Karissa Prier COMPARISON:  CTA chest, abdomen, and pelvis, dated the same TECHNIQUE:   Real-time ultrasound of the right upper quadrant was performed with a curvilinear transducer with both volumetric sweeps and still imaging techniques  FINDINGS: PANCREAS:  Visualized portions of the pancreas are within normal limits  AORTA AND IVC:  Visualized portions are normal for patient age  LIVER: Size:  The liver measures 17 3 cm in the midclavicular line   Contour:  Surface contour is smooth  Parenchyma:  Heterogeneous hyperechoic echotexture the liver, suspicious for fatty infiltration  No liver mass identified  Limited imaging of the main portal vein shows it to be patent and hepatopetal   BILIARY: Gallbladder wall thickening, gallbladder wall measures approximately 5 mm in thickness  Mildly distended  0 7 cm echogenic focus seen along the posterior wall, nonmobile, could represent adherent stone versus gallbladder polyp  No sonographic Rush sign elicited during real-time examination  Otherwise grossly unremarkable No intrahepatic biliary dilatation  CBD measures 5 mm  No choledocholithiasis  KIDNEY: Right kidney measures 11 5 x 3 9 x 4 3 cm  Volume 101 mL Kidney within normal limits  ASCITES:   None  Impression: Fatty infiltration of the liver is suspected  In the setting of abdominal pain and/or elevated liver function tests, consider steatohepatitis  0 7 cm echogenic focus seen along the posterior gallbladder wall, nonmobile, could represent adherent stone versus gallbladder polyp  Gallbladder wall thickening, as described  Differential considerations include gallbladder wall edema or acute versus chronic inflammation  Correlation with the patient's symptoms and laboratory values recommended  Other findings as above   Workstation performed: [id number]

## 2023-01-13 NOTE — PATIENT INSTRUCTIONS
Deep Vein Thrombosis Prevention   WHAT YOU NEED TO KNOW:   Deep vein thrombosis (DVT) is a blood clot that forms in a deep vein of the body  The deep veins in the legs, thighs, and hips are the most common sites for DVT  DVT can also occur in your arms  The clot prevents the normal flow of blood in the vein  The blood backs up and causes pain and swelling  The DVT can break into smaller pieces and travel to your lungs and cause a blockage called a pulmonary embolism  A pulmonary embolism can become life-threatening  DISCHARGE INSTRUCTIONS:   Call 911 for any of the following: You feel lightheaded, short of breath, and have chest pain  You cough up blood  Return to the emergency department if:   Your arm or leg feels warm, tender, and painful  It may look swollen and red  Contact your healthcare provider if:   You have questions or concerns about your condition or care  Risk factors for DVT:  A DVT can happen to anybody, but certain things can increase your risk  You may be at higher risk if you have had DVT in the past  You may also be at risk if you have a family member who has had blood clots  The following conditions also increase your risk:  Limited activity caused by bed rest, a leg cast, or sitting for long periods    Injury to a deep vein, or surgery    A blood disorder that makes your blood clot faster than normal, such as factor V Leiden mutation    Age older than 60 years    Use of hormone replacement therapy or some types of birth control medicine    Pregnancy, and for 6 weeks after childbirth     Cancer or heart failure     A catheter placed in a large vein    Smoking    Obesity or varicose veins  Prevent DVT:   Guidelines for everyone:      Maintain a healthy weight  Ask your healthcare provider how much you should weigh  Ask him to help you create a weight loss plan if you are overweight  Do not smoke    Nicotine and other chemicals in cigarettes and cigars can damage blood vessels and increase your risk for a DVT  Ask your healthcare provider for information if you currently smoke and need help to quit  E-cigarettes or smokeless tobacco still contain nicotine  Talk to your healthcare provider before you use these products  Move regularly if you sit for long periods of time  If you travel by car or work at a desk, move and stretch in your seat several times each hour  In an airplane, get up and walk every hour  Exercise your legs while sitting by raising and lowering your heels  Keep your toes on the floor while you do this  You can also raise and lower your toes while keeping your heels on the floor  Also tighten and release your leg muscles while sitting  Exercise regularly  to help increase your blood flow  Walking is a good low-impact exercise  Talk to your healthcare provider about the best exercise plan for you  Guidelines for people at high risk for DVT:      Take blood thinner medicines as directed  Your healthcare provider may recommend blood thinners and other medicines to help prevent blood clots  Wear pressure stockings as directed  The stockings are tight and put pressure on your legs  This improves blood flow and helps prevent clots  Wear the stockings during the day  Do not wear them when you sleep  Elevate your legs  above the level of your heart as often as you can  This will help decrease swelling and pain  Prop your legs on pillows or blankets to keep them elevated comfortably  Get up and move as directed after surgery or an injury, or during an illness  Early and regular movement can help decrease your risk for DVT by helping to increase your blood flow  Ask your healthcare provider what type of activity you need and how often you should do it  Change body positions often if you are bedridden  Ask for help to change your position every 1 to 2 hours    Follow up with your healthcare provider as directed:  Write down your questions so you remember to ask them during your visits  © 2017 [employer] [employer] Information is for End User's use only and may not be sold, redistributed or otherwise used for commercial purposes  All illustrations and images included in CareNotes® are the copyrighted property of [employer]  or [employer]  The above information is an  only  It is not intended as medical advice for individual conditions or treatments  Talk to your doctor, nurse or pharmacist before following any medical regimen to see if it is safe and effective for you  Cholecystitis   WHAT YOU NEED TO KNOW:   Cholecystitis is inflammation of your gallbladder  Your gallbladder stores bile, which helps break down the fat that you eat  Your gallbladder also helps remove certain chemicals from your body  You may have a sudden, severe attack (acute cholecystitis) or several mild attacks (chronic cholecystitis)  DISCHARGE INSTRUCTIONS:   Call 911 for any of the following: You have chest pain or trouble breathing  Return to the emergency department if:   You have severe pain in your abdomen  You urinate less than usual   Contact your healthcare provider if:   You have a fever or chills  You have pain when you urinate  Your skin or eyes turn yellow  You have questions or concerns about your condition or care  Medicines: You may need any of the following:  Antibiotics  treat a bacterial infection  Prescription pain medicine  may be given  Ask your healthcare provider how to take this medicine safely  Some prescription pain medicines contain acetaminophen  Do not take other medicines that contain acetaminophen without talking to your healthcare provider  Too much acetaminophen may cause liver damage  Prescription pain medicine may cause constipation  Ask your healthcare provider how to prevent or treat constipation  NSAIDs , such as ibuprofen, help decrease swelling, pain, and fever   This medicine is available with or without a doctor's order  NSAIDs can cause stomach bleeding or kidney problems in certain people  If you take blood thinner medicine, always ask your healthcare provider if NSAIDs are safe for you  Always read the medicine label and follow directions  Take your medicine as directed  Contact your healthcare provider if you think your medicine is not helping or if you have side effects  Tell him of her if you are allergic to any medicine  Keep a list of the medicines, vitamins, and herbs you take  Include the amounts, and when and why you take them  Bring the list or the pill bottles to follow-up visits  Carry your medicine list with you in case of an emergency  Eat a variety of healthy foods: This may decrease your symptoms  Healthy foods include fruit, vegetables, whole-grain breads, low-fat dairy products, beans, lean meat, and fish  Ask if you need to be on a special diet  Follow up with your healthcare provider as directed:  Write down your questions so you remember to ask them during your visits  © 2017 [facility] [facility] Information is for End User's use only and may not be sold, redistributed or otherwise used for commercial purposes  All illustrations and images included in CareNotes® are the copyrighted property of [employer]  or [employer]  The above information is an  only  It is not intended as medical advice for individual conditions or treatments  Talk to your doctor, nurse or pharmacist before following any medical regimen to see if it is safe and effective for you  Laparoscopic Cholecystectomy   WHAT YOU NEED TO KNOW:   Laparoscopic cholecystectomy is surgery to remove your gallbladder  DISCHARGE INSTRUCTIONS:   Medicines: You may need any of the following:  Prescription pain medicine  helps decrease pain  Do not wait until the pain is severe before you take this medicine  NSAIDs  decrease swelling and pain   This medicine can be bought with or without a doctor's order  This medicine can cause stomach bleeding or kidney problems in certain people  If you take blood thinner medicine, always ask your healthcare provider if NSAIDs are safe for you  Read the medicine label and follow the directions on it before using this medicine  Take your medicine as directed  Contact your healthcare provider if you think your medicine is not helping or if you have side effects  Tell him or her if you are allergic to any medicine  Keep a list of the medicines, vitamins, and herbs you take  Include the amounts, and when and why you take them  Bring the list or the pill bottles to follow-up visits  Carry your medicine list with you in case of an emergency  Follow up with your healthcare provider 2 weeks after surgery, or as directed:  Write down your questions so you remember to ask them during your visits  Wound care:  Care for your surgical wounds as directed  Keep the wounds clean and dry  You may take a shower the day after your surgery  What to eat after surgery:  Eat low-fat foods for 4 to 6 weeks while your body learns to digest fat without a gallbladder  Slowly increase the amount of fat that you eat  Drink plenty of liquids  Ask how much liquid to drink and which liquids are best for you  When to return to work and other activities: You may return to work or other activities as soon as your pain is controlled and you feel comfortable  For many people, this is 5 to 7 days after surgery  Contact your healthcare provider if:   You have a fever over 101°F (38°C) or chills  You have pain or nausea that is not relieved by medicine  You have redness and swelling around your incisions, or blood or pus is leaking from your incisions  You are constipated or have diarrhea  Your skin or eyes are yellow, or your bowel movements are pale  You have questions or concerns about your surgery, condition, or care    Seek care immediately or call 911 if:   You cannot stop vomiting  Your bowel movements are black or bloody  You have pain in your abdomen and it is swollen or hard  Your arm or leg feels warm, tender, and painful  It may look swollen and red  You feel lightheaded, short of breath, and have chest pain  You cough up blood  © 2017 [address] [employer] Information is for End User's use only and may not be sold, redistributed or otherwise used for commercial purposes  All illustrations and images included in CareNotes® are the copyrighted property of [employer]  or [employer]  The above information is an  only  It is not intended as medical advice for individual conditions or treatments  Talk to your doctor, nurse or pharmacist before following any medical regimen to see if it is safe and effective for you

## 2023-01-16 ENCOUNTER — CONSULT (OUTPATIENT)
Dept: INTERNAL MEDICINE CLINIC | Facility: CLINIC | Age: 53
End: 2023-01-16

## 2023-01-16 VITALS
BODY MASS INDEX: 38.02 KG/M2 | TEMPERATURE: 98.1 F | HEART RATE: 81 BPM | HEIGHT: 63 IN | SYSTOLIC BLOOD PRESSURE: 120 MMHG | DIASTOLIC BLOOD PRESSURE: 74 MMHG | RESPIRATION RATE: 16 BRPM | OXYGEN SATURATION: 97 % | WEIGHT: 214.6 LBS

## 2023-01-16 DIAGNOSIS — D50.0 IRON DEFICIENCY ANEMIA DUE TO CHRONIC BLOOD LOSS: ICD-10-CM

## 2023-01-16 DIAGNOSIS — E11.9 TYPE 2 DIABETES MELLITUS WITHOUT COMPLICATION, WITHOUT LONG-TERM CURRENT USE OF INSULIN (HCC): Primary | ICD-10-CM

## 2023-01-16 DIAGNOSIS — E78.49 OTHER HYPERLIPIDEMIA: ICD-10-CM

## 2023-01-16 NOTE — PROGRESS NOTES
INTERNAL MEDICINE PRE-OPERATIVE EVALUATION  [facility]    NAME: Ashley Montero  AGE: 45 y o  SEX: female  : 1970     DATE: 2023     Internal Medicine Pre-Operative Evaluation:     Chief Complaint: Pre-operative Evaluation     Surgery: choleycystectomy  Anticipated Date of Surgery: 23  Referring Provider: Jade Rosas MD        History of Present Illness:     Ashley Montero is a 45 y o  female who presents to the office today for a preoperative consultation at the request of surgeon, Dr Mariia Amezquita, who plans on performing choleycystecomy on   Planned anesthesia is general  Patient has a bleeding risk of: no recent abnormal bleeding  Current anti-platelet/anti-coagulation medications that the patient is prescribed includes: Rivaroxaban (Xarelto)  She will hold 2 days prior to surgery      Assessment of Chronic Conditions:   - Hypertension: Dm2     Assessment of Cardiac Risk:  · Denies unstable or severe angina or MI in the last 6 weeks or history of stent placement in the last year   · Denies decompensated heart failure (e g  New onset heart failure, NYHA functional class IV heart failure, or worsening existing heart failure)  · Denies significant arrhythmias such as high grade AV block, symptomatic ventricular arrhythmia, newly recognized ventricular tachycardia, supraventricular tachycardia with resting heart rate >100, or symptomatic bradycardia  · Denies severe heart valve disease including aortic stenosis or symptomatic mitral stenosis     Exercise Capacity:  · Able to walk 4 blocks without symptoms?: Yes  · Able to walk 2 flights without symptoms?: Yes    Prior Anesthesia Reactions: No     Personal history of venous thromboembolic disease? Yes    History of steroid use for >2 weeks within last year?  No     Review of Systems:     Review of Systems     Problem List:     Patient Active Problem List   Diagnosis   • Inflammatory spondylopathy of lumbosacral region ([place] [address] )   • Hyperlipidemia   • HLA B27 (HLA B27 positive)   • Elevated liver enzymes   • Depression   • Autoimmune disease (HCC)   • Pulmonary embolism (HCC)   • RSD (reflex sympathetic dystrophy)   • Unknown skin lesion   • Excoriation (skin-picking) disorder   • Iron deficiency anemia due to chronic blood loss   • Iron deficiency anemia   • Gastroesophageal reflux disease without esophagitis   • Health maintenance examination   • Tubular adenoma   • Type 2 diabetes mellitus without complication, without long-term current use of insulin ([facility])   • Other specified disorders involving the immune mechanism, not elsewhere classified ([place] [address] )   • Episode of recurrent major depressive disorder, unspecified depression episode severity ([place] [address] )   • Pseudopolyposis of colon without complication, unspecified part of colon ([place] [address] )   • Obesity, morbid ([place] [address] )   • Exertional angina ([place] [address] )   • Continuous opioid dependence ([place] [address] )   • Abdominal pain   • Chest pain   • Chronic cholecystitis        Allergies:      Allergies   Allergen Reactions   • Leflunomide Shortness Of Breath and Palpitations   • Penicillins Hives   • Tessalon Perles [Benzonatate] Facial Swelling   • Ciprofloxacin Rash   • Codeine Itching and Rash   • Morphine Itching and Rash        Current Medications:       Current Outpatient Medications:   •  b complex vitamins capsule, Take 1 capsule by mouth daily, Disp: , Rfl:   •  buPROPion (WELLBUTRIN XL) 300 mg 24 hr tablet, TAKE 1 TABLET BY MOUTH EVERY DAY IN THE MORNING, Disp: 90 tablet, Rfl: 3  •  calcium carbonate (OS-APRYL) 600 MG tablet, Take 600 mg by mouth 2 (two) times a day with meals, Disp: , Rfl:   •  cevimeline (EVOXAC) 30 MG capsule, Take 30 mg by mouth 3 (three) times a day, Disp: , Rfl:   •  Cholecalciferol (VITAMIN D3) 5000 units CAPS, Take by mouth, Disp: , Rfl:   •  desonide (DESOWEN) 0 05 % cream, Apply topically 2 (two) times a day (Patient taking differently: Apply topically as needed), Disp: 60 g, Rfl: 3  •  ferrous sulfate 324 (65 Fe) mg, Take 1 tablet (324 mg total) by mouth every other day, Disp: , Rfl:   •  folic acid (FOLVITE) 1 mg tablet, Take 3 tablets by mouth daily  , Disp: , Rfl:   •  glucosamine-chondroitin 500-400 MG tablet, Take 1 tablet by mouth 3 (three) times a day, Disp: , Rfl:   •  HYDROcodone-acetaminophen (NORCO) 5-325 mg per tablet, Take 1 tablet by mouth every 8 (eight) hours as needed, Disp: , Rfl:   •  inFLIXimab (REMICADE IV), Infuse into a venous catheter Every 6 weeks  , Disp: , Rfl:   •  LORazepam (ATIVAN) 0 5 mg tablet, Take 1 tablet (0 5 mg total) by mouth daily at bedtime, Disp: 30 tablet, Rfl: 0  •  methotrexate 2 5 mg tablet, Take 2 5 mg by mouth once a week 8 pills weekly, Disp: , Rfl:   •  neomycin-polymyxin-hydrocortisone (CORTISPORIN) otic solution, Administer 3 drops to the right ear every 8 (eight) hours, Disp: 10 mL, Rfl: 0  •  omeprazole (PriLOSEC) 40 MG capsule, TAKE 1 CAPSULE (40 MG TOTAL) BY MOUTH DAILY  , Disp: 90 capsule, Rfl: 0  •  Probiotic Product (PROBIOTIC-10) CAPS, Take by mouth, Disp: , Rfl:   •  rosuvastatin (CRESTOR) 10 MG tablet, TAKE 1 TABLET BY MOUTH EVERY DAY, Disp: 90 tablet, Rfl: 1  •  sertraline (ZOLOFT) 100 mg tablet, TAKE 1 TABLET BY MOUTH EVERY DAY, Disp: 90 tablet, Rfl: 3  •  VITAMIN B COMPLEX-C PO, vitamin B complex  Take 1 tablet by mouth once daily  , Disp: , Rfl:   •  Xarelto 20 MG tablet, TAKE 1 TABLET BY MOUTH EVERY DAY, Disp: 90 tablet, Rfl: 3  •  mometasone (NASONEX) 50 mcg/act nasal spray, SPRAY 2 SPRAYS INTO EACH NOSTRIL EVERY DAY, Disp: 17 g, Rfl: 2     Past History:     Past Medical History:   Diagnosis Date   • Arthritis    • Colon polyp    • CRPS (complex regional pain syndrome)    • DVT (deep venous thrombosis) (HCC)    • GERD (gastroesophageal reflux disease)    • H/O total hysterectomy    • Inflammatory spondylopathies ([facility] [address] )    • Nonmelanoma skin cancer     LAST ASSESSED: [id number]   • PE (pulmonary thromboembolism) ([facility] [address] )    • PONV (postoperative nausea and vomiting)    • Squamous cell carcinoma    • Squamous cell skin cancer     nose    • Urinary incontinence    • Uterine leiomyoma     LAST ASSESSED: [id number]        Past Surgical History:   Procedure Laterality Date   • ANKLE SURGERY     • BACK SURGERY  2018   • BELOW KNEE LEG AMPUTATION      LAST ASSESSED: [id number]   • BUNIONECTOMY Right    •  SECTION     • HYSTERECTOMY      LAST ASSESSED: [id number]   • OTHER SURGICAL HISTORY      SPINAL STEROTAXIS STIMULATION OF CORD; LAST ASSESSED: [id number]   • UT COLONOSCOPY FLX DX W/COLLJ SPEC WHEN PFRMD N/A 2018    Procedure: COLONOSCOPY;  Surgeon: Melida Becker MD;  Location: [facility]; Service: Gastroenterology   • UT ESOPHAGOGASTRODUODENOSCOPY TRANSORAL DIAGNOSTIC N/A 2018    Procedure: ESOPHAGOGASTRODUODENOSCOPY (EGD); Surgeon: Melida Becker MD;  Location: [facility];   Service: Gastroenterology   • UT LAPAROSCOPY W/RMVL ADNEXAL STRUCTURES N/A 11/10/2017    Procedure: LAPAROSCOPIC REMOVAL OF BILATERAL OVARIES;  LYSIS OF ADHESIONS;  Surgeon: Staci Lynne MD;  Location: [facility];  Service: Gynecology   • SINUS SURGERY          Family History   Problem Relation Age of Onset   • Hypertension Mother    • Heart disease Mother    • Coronary artery disease Mother         Due to calcified coronary lesion    • Diabetes Father         Type 2, controlled with neuropathy    • Vitiligo Brother    • Breast cancer Cousin 28   • Breast cancer Other    • Vitiligo Daughter         Social History     Socioeconomic History   • Marital status: Single     Spouse name: Not on file   • Number of children: Not on file   • Years of education: Not on file   • Highest education level: Not on file   Occupational History   • Occupation: Retired   Tobacco Use   • Smoking status: Never   • Smokeless tobacco: Never   Vaping Use   • Vaping Use: Never used   Substance and Sexual Activity   • Alcohol use: No   • Drug use: No   • Sexual activity: Yes     Partners: Male   Other Topics Concern   • Not on file   Social History Narrative   • Not on file     Social Determinants of Health     Financial Resource Strain: Not on file   Food Insecurity: Not on file   Transportation Needs: Not on file   Physical Activity: Not on file   Stress: Not on file   Social Connections: Not on file   Intimate Partner Violence: Not on file   Housing Stability: Not on file        Physical Exam:      /74 (BP Location: Left arm, Patient Position: Sitting, Cuff Size: Large)   Pulse 81   Temp 98 1 °F (36 7 °C) (Tympanic)   Resp 16   Ht 5' 3" (1 6 m)   Wt 97 3 kg (214 lb 9 6 oz)   SpO2 97%   BMI 38 01 kg/m²     Physical Exam      Data:     Pre-operative work-up    Laboratory Results: I have personally reviewed the pertinent laboratory results/reports     EKG: I have personally reviewed pertinent reports  Chest x-ray: CT chest reviewed from ER    Previous cardiopulmonary studies within the past year:  · Echocardiogram:   · Cardiac Catheterization:   · Stress Test:   · Pulmonary Function Testing:        Assessment:     1  Type 2 diabetes mellitus without complication, without long-term current use of insulin (HCC)             Plan:     45 y o  female with planned surgery: choleycystectomy  Cardiac Risk Estimation: per the Revised Cardiac Risk Index (Circ  100:1043, 1999), the patient's risk factors for cardiac complications include DM2, putting her in: RCI RISK CLASS II (1 risk factor, risk of major cardiac compl  appr  1 3%)  1  Further preoperative workup as follows:   - None; no further preoperative work-up is required    2  Medication Management/Recommendations:   - Regarding anti-platelet agents: hold 2 days prior  Resume when ok with surgery  3  Prophylaxis for cardiac events with perioperative beta-blockers: not indicated      4  Patient requires further consultation with: None    Clearance  Patient is CLEARED for surgery without any additional cardiac testing       Travon Zapata, [facility]  [address] [address]  Phone#  [phone]  Fax#  [phone]

## 2023-01-17 ENCOUNTER — ANESTHESIA EVENT (OUTPATIENT)
Dept: PERIOP | Facility: HOSPITAL | Age: 53
End: 2023-01-17

## 2023-01-24 RX ORDER — ALBUTEROL SULFATE 90 UG/1
2 AEROSOL, METERED RESPIRATORY (INHALATION) EVERY 6 HOURS PRN
COMMUNITY

## 2023-01-24 NOTE — PRE-PROCEDURE INSTRUCTIONS
Pre-Surgery Instructions:   Medication Instructions   • b complex vitamins capsule Instructions provided by MD   • buPROPion (WELLBUTRIN XL) 300 mg 24 hr tablet Take day of surgery  • calcium carbonate (OS-APRYL) 600 MG tablet Instructions provided by MD   • cevimeline (EVOXAC) 30 MG capsule instructed to hold   • Cholecalciferol (VITAMIN D3) 5000 units CAPS Instructions provided by MD   • desonide (DESOWEN) 0 05 % cream Hold day of surgery  • ferrous sulfate 324 (65 Fe) mg Instructions provided by MD   • folic acid (FOLVITE) 1 mg tablet Instructions provided by MD   • glucosamine-chondroitin 500-400 MG tablet Instructions provided by MD   • HYDROcodone-acetaminophen (NORCO) 5-325 mg per tablet Uses PRN- OK to take day of surgery   • inFLIXimab (REMICADE IV) n/a   • LORazepam (ATIVAN) 0 5 mg tablet Take night before surgery   • methotrexate 2 5 mg tablet n/a   • mometasone (NASONEX) 50 mcg/act nasal spray Uses PRN- OK to take day of surgery   • omeprazole (PriLOSEC) 40 MG capsule Take day of surgery  • Probiotic Product (PROBIOTIC-10) CAPS Instructions provided by MD   • rosuvastatin (CRESTOR) 10 MG tablet Take night before surgery   • sertraline (ZOLOFT) 100 mg tablet Take day of surgery  • VITAMIN B COMPLEX-C PO Instructions provided by MD   • Xarelto 20 MG tablet Instructions provided by MD    [facility] preop instructions reviewed with pt  Pt has surgical soap

## 2023-01-25 ENCOUNTER — HOSPITAL ENCOUNTER (OUTPATIENT)
Facility: HOSPITAL | Age: 53
Setting detail: OUTPATIENT SURGERY
Discharge: HOME | End: 2023-01-25
Attending: SURGERY | Admitting: SURGERY

## 2023-01-25 ENCOUNTER — ANESTHESIA (OUTPATIENT)
Dept: PERIOP | Facility: HOSPITAL | Age: 53
End: 2023-01-25

## 2023-01-25 VITALS
HEIGHT: 63 IN | DIASTOLIC BLOOD PRESSURE: 68 MMHG | TEMPERATURE: 97.4 F | RESPIRATION RATE: 18 BRPM | SYSTOLIC BLOOD PRESSURE: 119 MMHG | WEIGHT: 213.63 LBS | HEART RATE: 72 BPM | OXYGEN SATURATION: 96 % | BODY MASS INDEX: 37.85 KG/M2

## 2023-01-25 DIAGNOSIS — K81.1 CHRONIC CHOLECYSTITIS: ICD-10-CM

## 2023-01-25 PROBLEM — Z98.890 PONV (POSTOPERATIVE NAUSEA AND VOMITING): Status: ACTIVE | Noted: 2023-01-25

## 2023-01-25 PROBLEM — K76.9 CHRONIC LIVER DISEASE: Status: ACTIVE | Noted: 2023-01-25

## 2023-01-25 PROBLEM — R11.2 PONV (POSTOPERATIVE NAUSEA AND VOMITING): Status: ACTIVE | Noted: 2023-01-25

## 2023-01-25 PROBLEM — D68.9 COAGULATION DISORDER (HCC): Status: ACTIVE | Noted: 2023-01-25

## 2023-01-25 PROBLEM — M19.90 ARTHRITIS: Status: ACTIVE | Noted: 2023-01-25

## 2023-01-25 PROBLEM — D68.59 HYPERCOAGULABLE STATE (HCC): Status: ACTIVE | Noted: 2023-01-25

## 2023-01-25 PROBLEM — Z90.710 HISTORY OF HYSTERECTOMY: Status: ACTIVE | Noted: 2023-01-25

## 2023-01-25 LAB — GLUCOSE SERPL-MCNC: 172 MG/DL (ref 65–140)

## 2023-01-25 RX ORDER — MAGNESIUM HYDROXIDE 1200 MG/15ML
LIQUID ORAL AS NEEDED
Status: DISCONTINUED | OUTPATIENT
Start: 2023-01-25 | End: 2023-01-25 | Stop reason: HOSPADM

## 2023-01-25 RX ORDER — HYDROMORPHONE HCL/PF 1 MG/ML
0.2 SYRINGE (ML) INJECTION
Status: DISCONTINUED | OUTPATIENT
Start: 2023-01-25 | End: 2023-01-25 | Stop reason: HOSPADM

## 2023-01-25 RX ORDER — LIDOCAINE HYDROCHLORIDE 20 MG/ML
INJECTION, SOLUTION EPIDURAL; INFILTRATION; INTRACAUDAL; PERINEURAL AS NEEDED
Status: DISCONTINUED | OUTPATIENT
Start: 2023-01-25 | End: 2023-01-25

## 2023-01-25 RX ORDER — BUPIVACAINE HYDROCHLORIDE 2.5 MG/ML
INJECTION, SOLUTION EPIDURAL; INFILTRATION; INTRACAUDAL AS NEEDED
Status: DISCONTINUED | OUTPATIENT
Start: 2023-01-25 | End: 2023-01-25 | Stop reason: HOSPADM

## 2023-01-25 RX ORDER — SCOLOPAMINE TRANSDERMAL SYSTEM 1 MG/1
1 PATCH, EXTENDED RELEASE TRANSDERMAL
Status: DISCONTINUED | OUTPATIENT
Start: 2023-01-25 | End: 2023-01-25 | Stop reason: HOSPADM

## 2023-01-25 RX ORDER — ONDANSETRON 2 MG/ML
4 INJECTION INTRAMUSCULAR; INTRAVENOUS EVERY 8 HOURS PRN
Status: DISCONTINUED | OUTPATIENT
Start: 2023-01-25 | End: 2023-01-25 | Stop reason: HOSPADM

## 2023-01-25 RX ORDER — FENTANYL CITRATE 50 UG/ML
INJECTION, SOLUTION INTRAMUSCULAR; INTRAVENOUS AS NEEDED
Status: DISCONTINUED | OUTPATIENT
Start: 2023-01-25 | End: 2023-01-25

## 2023-01-25 RX ORDER — CLINDAMYCIN PHOSPHATE 900 MG/50ML
900 INJECTION INTRAVENOUS ONCE
Status: COMPLETED | OUTPATIENT
Start: 2023-01-25 | End: 2023-01-25

## 2023-01-25 RX ORDER — PROPOFOL 10 MG/ML
INJECTION, EMULSION INTRAVENOUS AS NEEDED
Status: DISCONTINUED | OUTPATIENT
Start: 2023-01-25 | End: 2023-01-25

## 2023-01-25 RX ORDER — SODIUM CHLORIDE, SODIUM LACTATE, POTASSIUM CHLORIDE, CALCIUM CHLORIDE 600; 310; 30; 20 MG/100ML; MG/100ML; MG/100ML; MG/100ML
125 INJECTION, SOLUTION INTRAVENOUS
Status: COMPLETED | OUTPATIENT
Start: 2023-01-25 | End: 2023-01-25

## 2023-01-25 RX ORDER — FENTANYL CITRATE/PF 50 MCG/ML
50 SYRINGE (ML) INJECTION
Status: COMPLETED | OUTPATIENT
Start: 2023-01-25 | End: 2023-01-25

## 2023-01-25 RX ORDER — ROCURONIUM BROMIDE 10 MG/ML
INJECTION, SOLUTION INTRAVENOUS AS NEEDED
Status: DISCONTINUED | OUTPATIENT
Start: 2023-01-25 | End: 2023-01-25

## 2023-01-25 RX ORDER — KETAMINE HCL IN NACL, ISO-OSM 100MG/10ML
SYRINGE (ML) INJECTION AS NEEDED
Status: DISCONTINUED | OUTPATIENT
Start: 2023-01-25 | End: 2023-01-25

## 2023-01-25 RX ORDER — SODIUM CHLORIDE, SODIUM LACTATE, POTASSIUM CHLORIDE, CALCIUM CHLORIDE 600; 310; 30; 20 MG/100ML; MG/100ML; MG/100ML; MG/100ML
20 INJECTION, SOLUTION INTRAVENOUS CONTINUOUS
Status: DISCONTINUED | OUTPATIENT
Start: 2023-01-25 | End: 2023-01-25 | Stop reason: HOSPADM

## 2023-01-25 RX ORDER — ONDANSETRON 2 MG/ML
INJECTION INTRAMUSCULAR; INTRAVENOUS AS NEEDED
Status: DISCONTINUED | OUTPATIENT
Start: 2023-01-25 | End: 2023-01-25

## 2023-01-25 RX ORDER — HEPARIN SODIUM 5000 [USP'U]/ML
5000 INJECTION, SOLUTION INTRAVENOUS; SUBCUTANEOUS ONCE
Status: COMPLETED | OUTPATIENT
Start: 2023-01-25 | End: 2023-01-25

## 2023-01-25 RX ORDER — MIDAZOLAM HYDROCHLORIDE 2 MG/2ML
INJECTION, SOLUTION INTRAMUSCULAR; INTRAVENOUS AS NEEDED
Status: DISCONTINUED | OUTPATIENT
Start: 2023-01-25 | End: 2023-01-25

## 2023-01-25 RX ORDER — TRAMADOL HYDROCHLORIDE 50 MG/1
50 TABLET ORAL EVERY 6 HOURS PRN
Status: DISCONTINUED | OUTPATIENT
Start: 2023-01-25 | End: 2023-01-25 | Stop reason: HOSPADM

## 2023-01-25 RX ORDER — SODIUM CHLORIDE, SODIUM LACTATE, POTASSIUM CHLORIDE, CALCIUM CHLORIDE 600; 310; 30; 20 MG/100ML; MG/100ML; MG/100ML; MG/100ML
125 INJECTION, SOLUTION INTRAVENOUS CONTINUOUS
Status: DISCONTINUED | OUTPATIENT
Start: 2023-01-25 | End: 2023-01-25 | Stop reason: HOSPADM

## 2023-01-25 RX ORDER — ONDANSETRON 2 MG/ML
4 INJECTION INTRAMUSCULAR; INTRAVENOUS ONCE AS NEEDED
Status: DISCONTINUED | OUTPATIENT
Start: 2023-01-25 | End: 2023-01-25 | Stop reason: HOSPADM

## 2023-01-25 RX ADMIN — FENTANYL CITRATE 50 MCG: 50 INJECTION, SOLUTION INTRAMUSCULAR; INTRAVENOUS at 12:40

## 2023-01-25 RX ADMIN — FENTANYL CITRATE 100 MCG: 50 INJECTION, SOLUTION INTRAMUSCULAR; INTRAVENOUS at 11:31

## 2023-01-25 RX ADMIN — TRAMADOL HYDROCHLORIDE 50 MG: 50 TABLET, COATED ORAL at 13:50

## 2023-01-25 RX ADMIN — ROCURONIUM BROMIDE 40 MG: 10 INJECTION, SOLUTION INTRAVENOUS at 11:32

## 2023-01-25 RX ADMIN — ONDANSETRON 4 MG: 2 INJECTION INTRAMUSCULAR; INTRAVENOUS at 12:11

## 2023-01-25 RX ADMIN — Medication 10 MG: at 11:50

## 2023-01-25 RX ADMIN — SODIUM CHLORIDE, SODIUM LACTATE, POTASSIUM CHLORIDE, AND CALCIUM CHLORIDE 125 ML/HR: .6; .31; .03; .02 INJECTION, SOLUTION INTRAVENOUS at 10:34

## 2023-01-25 RX ADMIN — MIDAZOLAM HYDROCHLORIDE 2 MG: 1 INJECTION, SOLUTION INTRAMUSCULAR; INTRAVENOUS at 11:26

## 2023-01-25 RX ADMIN — Medication 30 MG: at 11:31

## 2023-01-25 RX ADMIN — FENTANYL CITRATE 50 MCG: 50 INJECTION, SOLUTION INTRAMUSCULAR; INTRAVENOUS at 12:31

## 2023-01-25 RX ADMIN — ROCURONIUM BROMIDE 10 MG: 10 INJECTION, SOLUTION INTRAVENOUS at 11:45

## 2023-01-25 RX ADMIN — SCOPALAMINE 1 PATCH: 1 PATCH, EXTENDED RELEASE TRANSDERMAL at 10:34

## 2023-01-25 RX ADMIN — SUGAMMADEX 200 MG: 100 INJECTION, SOLUTION INTRAVENOUS at 12:27

## 2023-01-25 RX ADMIN — HEPARIN SODIUM 5000 UNITS: 5000 INJECTION INTRAVENOUS; SUBCUTANEOUS at 10:33

## 2023-01-25 RX ADMIN — FENTANYL CITRATE 50 MCG: 50 INJECTION INTRAMUSCULAR; INTRAVENOUS at 13:15

## 2023-01-25 RX ADMIN — PROPOFOL 170 MG: 10 INJECTION, EMULSION INTRAVENOUS at 11:31

## 2023-01-25 RX ADMIN — LIDOCAINE HYDROCHLORIDE 100 MG: 20 INJECTION, SOLUTION EPIDURAL; INFILTRATION; INTRACAUDAL; PERINEURAL at 11:31

## 2023-01-25 RX ADMIN — FENTANYL CITRATE 50 MCG: 50 INJECTION INTRAMUSCULAR; INTRAVENOUS at 13:10

## 2023-01-25 RX ADMIN — CLINDAMYCIN PHOSPHATE 900 MG: 900 INJECTION, SOLUTION INTRAVENOUS at 11:07

## 2023-01-25 RX ADMIN — SODIUM CHLORIDE, SODIUM LACTATE, POTASSIUM CHLORIDE, AND CALCIUM CHLORIDE: .6; .31; .03; .02 INJECTION, SOLUTION INTRAVENOUS at 11:26

## 2023-01-25 RX ADMIN — PROPOFOL 30 MG: 10 INJECTION, EMULSION INTRAVENOUS at 11:46

## 2023-01-25 NOTE — DISCHARGE INSTR - AVS FIRST PAGE
SURGERY INSTRUCTIONS    No diet restriction for this surgery  May shower every day starting tomorrow  Remove plastic dressings in 3 days  Remove strips in 7 days  Nothing should be covering incisions 7 days after surgery  Call office to make an appointment in 2 weeks [phone]  Call office with any issues regarding the surgery  No driving, heavy lifting or strenuous exercise for one week  Resume home medications starting today  Resume blood thinners starting tomorrow  (Aspirin, Coumadin, Eliquis, Xarelto)  Apply ice to the incisions  10 minutes every hour for 2 days  May take Tylenol 3, regular Tylenol or ibuprofen for pain  Alternating narcotics with ibuprofen or Advil will have better pain control    May take Colace for constipation  After LAPAROSCOPIC surgery you may experience shoulder pain that usually resolves in 2-3 days or taking plain Tylenol

## 2023-01-25 NOTE — OP NOTE
OPERATIVE REPORT  PATIENT NAME: Loraine Walls    :  1970  MRN: [id number]  Pt Location: [facility] OR ROOM 03    SURGERY DATE: 2023    Surgeon(s) and Role:     * Nita Wong MD - Primary     * Jabari Basurto PA-C - Assisting     * Janusz Cote PA-C - Assisting    Preop Diagnosis:  Chronic cholecystitis [K81 1]    Post-Op Diagnosis Codes:     * Chronic cholecystitis [K81 1]    Procedure(s):  CHOLECYSTECTOMY LAPAROSCOPIC    Specimen(s):  ID Type Source Tests Collected by Time Destination   1 : Gallblader and contents Tissue Gallbladder TISSUE EXAM Nita Wong MD 2023 1151        Estimated Blood Loss:   Minimal    Drains:  None    Anesthesia Type:   General    Operative Indications:  Chronic cholecystitis [K81 1]    Operative Findings: The abdominal cavity showed no adhesions  The gallbladder was completely covered by omentum with adhesions, without acute inflammatory process  Gallbladder appeared somewhat distended  With mild fatty infiltration  The rest of abdominal cavity showed no evidence of inflammatory or neoplastic process  Complications:   None    Procedure and Technique:  The patient was identified and the patient was placed in the operating table in a supine position  After adequate esthesia induction and satisfactory endotracheal intubation the abdomen was prepped and draped in sterile usual fashion with ChloraPrep  Timeout was called the patient was identified as postsurgical site  An incision was made at Chavez's point and 5 mm trocar was introduced under direct vision with the help of the Visiport  After verifying the positioning the abdomen was insufflated with CO2  After obtaining adequate pneumoperitoneum the scope was advanced and exploration was performed with above findings  5 mm trocar was placed in the supraumbilical area, 5 mm trocar at Chavez's point was replaced by 11 mm trocar    5 mm trocar the midclavicular and anterior axillary line, all the trochars were inserted under direct vision  Patient is were carefully taken down between the gallbladder and the surrounding fatty tissue using harmonic scalpel, the fundus of the gallbladder was grasped and pulled towards the right shoulder, the adhesions were carefully taken down using cautery and harmonic scalpel  Lombardo's pouch was grasped and pulled towards the right side, the cystic duct was identified, dissected and stapled proximally x2 and distally then divided with scissors  Cystic artery was also identified, dissected and the stapled proximally and distally and then divided with scissors  Gallbladder was removed from the gallbladder fossa using cautery  Gallbladder was retrieved through the Chavez's point port site with the help of the Endo Catch  The abdominal cavity was copiously irrigated with saline solution  Gallbladder fossa was dry without evidence of bleeding or bile leak  Cystic duct and cystic artery were reinspected with no evidence of bile leak or bleeding respectively  Ports were removed under direct vision without evidence of bleeding from abdominal wall  Chavez's port site fascia was closed with 0 Vicryl interrupted figure-of-eight fashion  Subcutaneous tissue on all the incisions were infiltrated with 0 25% of Marcaine and the skin was closed with 4-0 Vicryl in an interrupted subcuticular fashion on all the incisions  At the end of the case instrument, needles, and sponge counts were correct  Patient tolerated the procedure well     I was present for the entire procedure, A qualified resident physician was not available and A physician assistant was required during the procedure for retraction tissue handling,dissection and suturing    Patient Disposition:  PACU , hemodynamically stable and extubated and stable        SIGNATURE: Ganesh Alfaro MD  DATE: January 25, 2023  TIME: 12:25 PM

## 2023-01-25 NOTE — ANESTHESIA POSTPROCEDURE EVALUATION
Post-Op Assessment Note    CV Status:  Stable    Pain management: adequate     Mental Status:  Alert and awake   Hydration Status:  Euvolemic   PONV Controlled:  Controlled   Airway Patency:  Patent   Two or more mitigation strategies used for obstructive sleep apnea   Post Op Vitals Reviewed: Yes      Staff: CRNA         No notable events documented      /97 (01/25/23 1242)    Temp 98 5 °F (36 9 °C) (01/25/23 1242)    Pulse 76 (01/25/23 1242)   Resp 19 (01/25/23 1242)    SpO2 97 % (01/25/23 1242)

## 2023-01-25 NOTE — ANESTHESIA PREPROCEDURE EVALUATION
Procedure:  CHOLECYSTECTOMY LAPAROSCOPIC (Abdomen)    Relevant Problems   ANESTHESIA   (+) PONV (postoperative nausea and vomiting)      CARDIO   (+) Chest pain   (+) Exertional angina (HCC)   (+) Hyperlipidemia   (+) Pulmonary embolism (HCC)      ENDO   (+) Type 2 diabetes mellitus without complication, without long-term current use of insulin (HCC)      GI/HEPATIC  confirmed NPO appropriate   (+) Fatty liver   (+) Gastroesophageal reflux disease without esophagitis (well-controlled)      /RENAL (within normal limits)      GYN   (+) History of hysterectomy      HEMATOLOGY   (+) Hypercoagulable state ([facility] [address] ) (Hx of recurrent DVT as well as PE  Last dose of xeralto 1/22)   (+) Iron deficiency anemia   (+) Iron deficiency anemia due to chronic blood loss      MUSCULOSKELETAL   (+) Arthritis      NEURO/PSYCH   (+) Continuous opioid dependence (HCC)   (+) Depression   (+) Episode of recurrent major depressive disorder, unspecified depression episode severity (HCC)   (+) RSD (reflex sympathetic dystrophy)      PULMONARY  Endorses snoring, no formal testing or dx of ARIC   (-) Smoking   (-) URI (upper respiratory infection)      Other   (+) [facility]        Physical Exam    Airway    Mallampati score: III  TM Distance: >3 FB  Neck ROM: full     Dental   No notable dental hx     Cardiovascular  Rhythm: regular, Rate: normal,     Pulmonary  Breath sounds clear to auscultation,     Other Findings        Anesthesia Plan  ASA Score- 3     Anesthesia Type- general with ASA Monitors  Additional Monitors:   Airway Plan: ETT  Plan Factors-Exercise tolerance (METS): >4 METS  Chart reviewed  Existing labs reviewed  Patient is not a current smoker  Obstructive sleep apnea risk education given perioperatively  Induction- intravenous  Postoperative Plan- Plan for postoperative opioid use   Planned trial extubation    Informed Consent- Anesthetic plan and risks discussed with patient and spouse  I personally reviewed this patient with the CRNA  Discussed and agreed on the Anesthesia Plan with the CRNA             Lab Results   Component Value Date    WBC 7 50 12/25/2022    HGB 11 7 12/25/2022    HCT 36 6 12/25/2022    MCV 79 (L) 12/25/2022     12/25/2022     Lab Results   Component Value Date    SODIUM 142 12/25/2022    K 3 9 12/25/2022     12/25/2022    CO2 27 12/25/2022    BUN 16 12/25/2022    CREATININE 0 84 12/25/2022    GLUC 104 12/25/2022    CALCIUM 8 9 12/25/2022     Lab Results   Component Value Date    ALT 40 12/25/2022    AST 30 12/25/2022    ALKPHOS 81 12/25/2022    BILITOT <0 2 02/13/2017     Lab Results   Component Value Date    INR 1 26 (H) 10/30/2017    INR 2 57 (H) 08/08/2016    PROTIME 15 9 (H) 10/30/2017    PROTIME 27 2 (H) 08/08/2016     Lab Results   Component Value Date    HGBA1C 6 0 (H) 11/19/2021

## 2023-02-02 ENCOUNTER — VBI (OUTPATIENT)
Dept: ADMINISTRATIVE | Facility: OTHER | Age: 53
End: 2023-02-02

## 2023-02-06 ENCOUNTER — VBI (OUTPATIENT)
Dept: ADMINISTRATIVE | Facility: OTHER | Age: 53
End: 2023-02-06

## 2023-02-07 ENCOUNTER — APPOINTMENT (OUTPATIENT)
Dept: LAB | Facility: CLINIC | Age: 53
End: 2023-02-07

## 2023-02-07 DIAGNOSIS — D50.0 IRON DEFICIENCY ANEMIA DUE TO CHRONIC BLOOD LOSS: ICD-10-CM

## 2023-02-07 DIAGNOSIS — E78.5 HYPERLIPIDEMIA, UNSPECIFIED HYPERLIPIDEMIA TYPE: ICD-10-CM

## 2023-02-07 DIAGNOSIS — E78.49 OTHER HYPERLIPIDEMIA: ICD-10-CM

## 2023-02-07 DIAGNOSIS — E11.9 TYPE 2 DIABETES MELLITUS WITHOUT COMPLICATION, WITHOUT LONG-TERM CURRENT USE OF INSULIN (HCC): ICD-10-CM

## 2023-02-07 LAB
ALBUMIN SERPL BCP-MCNC: 3.6 G/DL (ref 3.5–5)
ALP SERPL-CCNC: 72 U/L (ref 46–116)
ALT SERPL W P-5'-P-CCNC: 32 U/L (ref 12–78)
ANION GAP SERPL CALCULATED.3IONS-SCNC: 5 MMOL/L (ref 4–13)
AST SERPL W P-5'-P-CCNC: 25 U/L (ref 5–45)
BASOPHILS # BLD AUTO: 0.03 THOUSANDS/ÂΜL (ref 0–0.1)
BASOPHILS NFR BLD AUTO: 1 % (ref 0–1)
BILIRUB SERPL-MCNC: 0.25 MG/DL (ref 0.2–1)
BUN SERPL-MCNC: 14 MG/DL (ref 5–25)
CALCIUM SERPL-MCNC: 9.1 MG/DL (ref 8.3–10.1)
CHLORIDE SERPL-SCNC: 108 MMOL/L (ref 96–108)
CHOLEST SERPL-MCNC: 221 MG/DL
CO2 SERPL-SCNC: 27 MMOL/L (ref 21–32)
CREAT SERPL-MCNC: 0.85 MG/DL (ref 0.6–1.3)
CREAT UR-MCNC: 111 MG/DL
EOSINOPHIL # BLD AUTO: 0.34 THOUSAND/ÂΜL (ref 0–0.61)
EOSINOPHIL NFR BLD AUTO: 8 % (ref 0–6)
ERYTHROCYTE [DISTWIDTH] IN BLOOD BY AUTOMATED COUNT: 15.9 % (ref 11.6–15.1)
GFR SERPL CREATININE-BSD FRML MDRD: 77 ML/MIN/1.73SQ M
GLUCOSE P FAST SERPL-MCNC: 127 MG/DL (ref 65–99)
HCT VFR BLD AUTO: 40.5 % (ref 34.8–46.1)
HDLC SERPL-MCNC: 46 MG/DL
HGB BLD-MCNC: 12.1 G/DL (ref 11.5–15.4)
IMM GRANULOCYTES # BLD AUTO: 0.02 THOUSAND/UL (ref 0–0.2)
IMM GRANULOCYTES NFR BLD AUTO: 0 % (ref 0–2)
LDLC SERPL CALC-MCNC: 122 MG/DL (ref 0–100)
LYMPHOCYTES # BLD AUTO: 1.6 THOUSANDS/ÂΜL (ref 0.6–4.47)
LYMPHOCYTES NFR BLD AUTO: 36 % (ref 14–44)
MCH RBC QN AUTO: 25.1 PG (ref 26.8–34.3)
MCHC RBC AUTO-ENTMCNC: 29.9 G/DL (ref 31.4–37.4)
MCV RBC AUTO: 84 FL (ref 82–98)
MICROALBUMIN UR-MCNC: 8.8 MG/L (ref 0–20)
MICROALBUMIN/CREAT 24H UR: 8 MG/G CREATININE (ref 0–30)
MONOCYTES # BLD AUTO: 0.4 THOUSAND/ÂΜL (ref 0.17–1.22)
MONOCYTES NFR BLD AUTO: 9 % (ref 4–12)
NEUTROPHILS # BLD AUTO: 2.12 THOUSANDS/ÂΜL (ref 1.85–7.62)
NEUTS SEG NFR BLD AUTO: 46 % (ref 43–75)
NONHDLC SERPL-MCNC: 175 MG/DL
NRBC BLD AUTO-RTO: 0 /100 WBCS
PLATELET # BLD AUTO: 240 THOUSANDS/UL (ref 149–390)
PMV BLD AUTO: 11.9 FL (ref 8.9–12.7)
POTASSIUM SERPL-SCNC: 4.8 MMOL/L (ref 3.5–5.3)
PROT SERPL-MCNC: 7.8 G/DL (ref 6.4–8.4)
RBC # BLD AUTO: 4.83 MILLION/UL (ref 3.81–5.12)
SODIUM SERPL-SCNC: 140 MMOL/L (ref 135–147)
TRIGL SERPL-MCNC: 267 MG/DL
TSH SERPL DL<=0.05 MIU/L-ACNC: 1.53 UIU/ML (ref 0.45–4.5)
WBC # BLD AUTO: 4.51 THOUSAND/UL (ref 4.31–10.16)

## 2023-02-08 ENCOUNTER — VBI (OUTPATIENT)
Dept: ADMINISTRATIVE | Facility: OTHER | Age: 53
End: 2023-02-08

## 2023-02-08 LAB
EST. AVERAGE GLUCOSE BLD GHB EST-MCNC: 126 MG/DL
HBA1C MFR BLD: 6 %

## 2023-02-09 ENCOUNTER — OFFICE VISIT (OUTPATIENT)
Dept: INTERNAL MEDICINE CLINIC | Facility: CLINIC | Age: 53
End: 2023-02-09

## 2023-02-09 VITALS
WEIGHT: 212.4 LBS | BODY MASS INDEX: 37.63 KG/M2 | HEIGHT: 63 IN | RESPIRATION RATE: 16 BRPM | HEART RATE: 90 BPM | SYSTOLIC BLOOD PRESSURE: 112 MMHG | DIASTOLIC BLOOD PRESSURE: 70 MMHG

## 2023-02-09 DIAGNOSIS — E11.628 TYPE 2 DIABETES MELLITUS WITH OTHER SKIN COMPLICATIONS (HCC): ICD-10-CM

## 2023-02-09 DIAGNOSIS — M46.97 INFLAMMATORY SPONDYLOPATHY OF LUMBOSACRAL REGION (HCC): ICD-10-CM

## 2023-02-09 DIAGNOSIS — O22.30 DVT (DEEP VEIN THROMBOSIS) IN PREGNANCY: ICD-10-CM

## 2023-02-09 DIAGNOSIS — I26.99 OTHER PULMONARY EMBOLISM WITHOUT ACUTE COR PULMONALE, UNSPECIFIED CHRONICITY (HCC): ICD-10-CM

## 2023-02-09 DIAGNOSIS — E78.5 HYPERLIPIDEMIA, UNSPECIFIED HYPERLIPIDEMIA TYPE: Primary | ICD-10-CM

## 2023-02-09 DIAGNOSIS — D89.89 OTHER SPECIFIED DISORDERS INVOLVING THE IMMUNE MECHANISM, NOT ELSEWHERE CLASSIFIED (HCC): ICD-10-CM

## 2023-02-09 NOTE — PROGRESS NOTES
INTERNAL MEDICINE FOLLOW-UP VISIT  [facility] - [facility] [place]    NAME: Lilliam Manzano  AGE: 45 y o  SEX: female  : 1970     DATE: 2023     Assessment and Plan:   1  DVT (deep vein thrombosis) in pregnancy  Lifelong therapy  - rivaroxaban (Xarelto) 20 mg tablet; Take 1 tablet (20 mg total) by mouth daily  Dispense: 90 tablet; Refill: 3    2  Other specified disorders involving the immune mechanism, not elsewhere classified ([facility] [address] )  Inflammatory arthropathy following w/ rheumatology    3  Inflammatory spondylopathy of lumbosacral region ([facility] [address] )      4  Other pulmonary embolism without acute cor pulmonale, unspecified chronicity ([facility] [address] )      5  Type 2 diabetes mellitus with other skin complications (HCC)  aic stable recommend to focus on diet   - CBC and differential; Future  - Comprehensive metabolic panel; Future  - Hemoglobin A1C; Future    6  Hyperlipidemia, unspecified hyperlipidemia type  LDL at goal on statin  - Lipid panel; Future  - TSH, 3rd generation with Free T4 reflex; Future     7  S/p choleycystectomy  Doing well   BMI Counseling: Body mass index is 37 62 kg/m²  The BMI is above normal  Nutrition recommendations include decreasing portion sizes and consuming healthier snacks  Exercise recommendations include exercising 3-5 times per week  No pharmacotherapy was ordered  Rationale for BMI follow-up plan is due to patient being overweight or obese  No follow-ups on file         Chief Complaint:     Chief Complaint   Patient presents with   • Follow-up      History of Present Illness:     Here for routine follow up and review labs  Just had gallbladder out  [place]  Admits to poor eating  Moods stable  Generalized aches and pains following w/ rheum  Taking chol meds daily     The following portions of the patient's history were reviewed and updated as appropriate: allergies, current medications, past family history, past medical history, past social history, past surgical history and problem list      Review of Systems:     Review of Systems   Constitutional: Negative for appetite change, chills, diaphoresis, fatigue, fever and unexpected weight change  HENT: Negative for postnasal drip and sneezing  Eyes: Negative for visual disturbance  Respiratory: Negative for chest tightness and shortness of breath  Cardiovascular: Negative for chest pain, palpitations and leg swelling  Gastrointestinal: Negative for abdominal pain and blood in stool  Endocrine: Negative for cold intolerance, heat intolerance, polydipsia, polyphagia and polyuria  Genitourinary: Negative for difficulty urinating, dysuria, frequency and urgency  Musculoskeletal: Negative for arthralgias and myalgias  Skin: Negative for rash and wound  Neurological: Negative for dizziness, weakness, light-headedness and headaches  Hematological: Negative for adenopathy  Psychiatric/Behavioral: Negative for confusion, dysphoric mood and sleep disturbance  The patient is not nervous/anxious           Past Medical History:     Past Medical History:   Diagnosis Date   • Anxiety    • Arthritis    • Asthma     when sick   • Colon polyp    • CRPS (complex regional pain syndrome)    • Depression    • DVT (deep venous thrombosis) ([facility])    • GERD (gastroesophageal reflux disease)    • H/O total hysterectomy    • History of transfusion     2017 - no adverse reaction   • Hyperlipidemia    • Inflammatory spondylopathies ([facility])    • Nonmelanoma skin cancer     LAST ASSESSED: [id number]   • PE (pulmonary thromboembolism) ([facility])    • PONV (postoperative nausea and vomiting)    • Prediabetes    • Squamous cell carcinoma    • Squamous cell skin cancer 2020    nose    • Urinary incontinence    • Uterine leiomyoma     LAST ASSESSED: [id number]   • Wears reading eyeglasses         Current Medications:     Current Outpatient Medications:   •  albuterol (PROVENTIL HFA,VENTOLIN HFA) 90 mcg/act inhaler, Inhale 2 puffs every 6 (six) hours as needed for wheezing, Disp: , Rfl:   •  b complex vitamins capsule, Take 1 capsule by mouth daily, Disp: , Rfl:   •  buPROPion (WELLBUTRIN XL) 300 mg 24 hr tablet, TAKE 1 TABLET BY MOUTH EVERY DAY IN THE MORNING, Disp: 90 tablet, Rfl: 3  •  calcium carbonate (OS-APRYL) 600 MG tablet, Take 600 mg by mouth 2 (two) times a day with meals, Disp: , Rfl:   •  cevimeline (EVOXAC) 30 MG capsule, Take 30 mg by mouth 3 (three) times a day, Disp: , Rfl:   •  Cholecalciferol (VITAMIN D3) 5000 units CAPS, Take by mouth, Disp: , Rfl:   •  desonide (DESOWEN) 0 05 % cream, Apply topically 2 (two) times a day (Patient taking differently: Apply topically as needed), Disp: 60 g, Rfl: 3  •  ferrous sulfate 324 (65 Fe) mg, Take 1 tablet (324 mg total) by mouth every other day, Disp: , Rfl:   •  folic acid (FOLVITE) 1 mg tablet, Take 3 tablets by mouth daily  , Disp: , Rfl:   •  glucosamine-chondroitin 500-400 MG tablet, Take 1 tablet by mouth in the morning, Disp: , Rfl:   •  HYDROcodone-acetaminophen (NORCO) 5-325 mg per tablet, Take 1 tablet by mouth every 8 (eight) hours as needed, Disp: , Rfl:   •  inFLIXimab (REMICADE IV), Infuse into a venous catheter Every 6 weeks  , Disp: , Rfl:   •  LORazepam (ATIVAN) 0 5 mg tablet, Take 1 tablet (0 5 mg total) by mouth daily at bedtime, Disp: 30 tablet, Rfl: 0  •  methotrexate 2 5 mg tablet, Take 2 5 mg by mouth once a week 8 pills weekly, Disp: , Rfl:   •  mometasone (NASONEX) 50 mcg/act nasal spray, SPRAY 2 SPRAYS INTO EACH NOSTRIL EVERY DAY, Disp: 17 g, Rfl: 2  •  omeprazole (PriLOSEC) 40 MG capsule, TAKE 1 CAPSULE (40 MG TOTAL) BY MOUTH DAILY  , Disp: 90 capsule, Rfl: 0  •  Probiotic Product (PROBIOTIC-10) CAPS, Take by mouth, Disp: , Rfl:   •  rivaroxaban (Xarelto) 20 mg tablet, Take 1 tablet (20 mg total) by mouth daily, Disp: 90 tablet, Rfl: 3  •  rosuvastatin (CRESTOR) 10 MG tablet, TAKE 1 TABLET BY MOUTH EVERY DAY, Disp: 90 tablet, Rfl: 1  • sertraline (ZOLOFT) 100 mg tablet, TAKE 1 TABLET BY MOUTH EVERY DAY, Disp: 90 tablet, Rfl: 3  •  VITAMIN B COMPLEX-C PO, vitamin B complex  Take 1 tablet by mouth once daily  , Disp: , Rfl:      Allergies: Allergies   Allergen Reactions   • Leflunomide Shortness Of Breath and Palpitations   • Penicillins Hives   • Ciprofloxacin Rash   • Codeine Itching and Rash   • Morphine Itching and Rash        Physical Exam:     /70 (BP Location: Left arm)   Pulse 90   Resp 16   Ht 5' 3" (1 6 m)   Wt 96 3 kg (212 lb 6 4 oz)   BMI 37 62 kg/m²     Physical Exam  Constitutional:       Appearance: She is well-developed  HENT:      Head: Normocephalic and atraumatic  Eyes:      Pupils: Pupils are equal, round, and reactive to light  Neck:      Thyroid: No thyromegaly  Cardiovascular:      Rate and Rhythm: Normal rate and regular rhythm  Heart sounds: No murmur heard  Pulmonary:      Effort: Pulmonary effort is normal       Breath sounds: Normal breath sounds  Abdominal:      General: Bowel sounds are normal       Palpations: Abdomen is soft  Musculoskeletal:         General: Normal range of motion  Cervical back: Normal range of motion and neck supple  Lymphadenopathy:      Cervical: No cervical adenopathy  Skin:     General: Skin is warm and dry  Neurological:      Mental Status: She is alert and oriented to person, place, and time  Data:     Laboratory Results: I have personally reviewed the pertinent laboratory results/reports   Radiology/Other Diagnostic Testing Results: I have personally reviewed pertinent reports        BRAYDEN Vang  [facility]

## 2023-02-16 ENCOUNTER — OFFICE VISIT (OUTPATIENT)
Dept: SURGERY | Facility: CLINIC | Age: 53
End: 2023-02-16

## 2023-02-16 VITALS
TEMPERATURE: 98 F | OXYGEN SATURATION: 95 % | SYSTOLIC BLOOD PRESSURE: 116 MMHG | DIASTOLIC BLOOD PRESSURE: 70 MMHG | RESPIRATION RATE: 16 BRPM | HEART RATE: 88 BPM | BODY MASS INDEX: 37.74 KG/M2 | HEIGHT: 63 IN | WEIGHT: 213 LBS

## 2023-02-16 DIAGNOSIS — Z48.89 POSTOPERATIVE VISIT: Primary | ICD-10-CM

## 2023-02-16 RX ORDER — CYCLOSPORINE 0.5 MG/ML
EMULSION OPHTHALMIC
COMMUNITY
Start: 2023-02-16

## 2023-02-16 NOTE — PROGRESS NOTES
Post-Op Follow Up- General Surgery   Rinku Ordoñez 45 y o  female MRN: [id number]  Unit/Bed#:  Encounter: [id number]    Assessment/Plan     Assessment:  Status post laparoscopic cholecystectomy, improved  Plan:  Patient is doing quite well from the surgical standpoint  She is discharged from my care and I will be glad to see her if any problem arises in the future  History of Present Illness     HPI:  Rinku Ordoñez is a 45 y o  female who presents to my office for first postop follow-up after laparoscopic cholecystectomy for chronic calculus cholecystitis from   The patient stated doing well, tolerating diet and having regular bowel movement  The patient denies having any fever, chills, nausea, vomiting, diarrhea, constipation or abdominal pain  Pathology discussed with patient        Historical Information   Past Medical History:   Diagnosis Date   • Anxiety    • Arthritis    • Asthma     when sick   • Colon polyp    • CRPS (complex regional pain syndrome)    • Depression    • DVT (deep venous thrombosis) (HCC)    • GERD (gastroesophageal reflux disease)    • H/O total hysterectomy    • History of transfusion      - no adverse reaction   • Hyperlipidemia    • Inflammatory spondylopathies (HCC)    • Nonmelanoma skin cancer     LAST ASSESSED: [id number]   • PE (pulmonary thromboembolism) (HCC)    • PONV (postoperative nausea and vomiting)    • Prediabetes    • Squamous cell carcinoma    • Squamous cell skin cancer     nose    • Urinary incontinence    • Uterine leiomyoma     LAST ASSESSED: [id number]   • Wears reading eyeglasses      Past Surgical History:   Procedure Laterality Date   • ANKLE SURGERY     • BACK SURGERY  2018   • BELOW KNEE LEG AMPUTATION      LAST ASSESSED: [id number]   • BUNIONECTOMY Right    •  SECTION     • CHOLECYSTECTOMY LAPAROSCOPIC N/A 2023    Procedure: CHOLECYSTECTOMY LAPAROSCOPIC;  Surgeon: Bree Liz MD;  Location: [facility] OR;  Service: General   • HYSTERECTOMY      LAST ASSESSED: [id number]   • OTHER SURGICAL HISTORY      SPINAL STEROTAXIS STIMULATION OF CORD; LAST ASSESSED: [id number]   • IA COLONOSCOPY FLX DX W/COLLJ SPEC WHEN PFRMD N/A 12/19/2018    Procedure: COLONOSCOPY;  Surgeon: Charity Gavin MD;  Location: [facility]; Service: Gastroenterology   • IA ESOPHAGOGASTRODUODENOSCOPY TRANSORAL DIAGNOSTIC N/A 12/19/2018    Procedure: ESOPHAGOGASTRODUODENOSCOPY (EGD); Surgeon: Charity Gavin MD;  Location: [facility];   Service: Gastroenterology   • IA LAPAROSCOPY W/RMVL ADNEXAL STRUCTURES N/A 11/10/2017    Procedure: LAPAROSCOPIC REMOVAL OF BILATERAL OVARIES;  LYSIS OF ADHESIONS;  Surgeon: Dyan Lu MD;  Location: [facility];  Service: Gynecology   • SINUS SURGERY     • SPINAL CORD STIMULATOR IMPLANT       Social History   Social History     Substance and Sexual Activity   Alcohol Use No     Social History     Substance and Sexual Activity   Drug Use No     Social History     Tobacco Use   Smoking Status Never   Smokeless Tobacco Never     Family History: non-contributory    Meds/Allergies   all medications and allergies reviewed     Current Outpatient Medications:   •  albuterol (PROVENTIL HFA,VENTOLIN HFA) 90 mcg/act inhaler, Inhale 2 puffs every 6 (six) hours as needed for wheezing, Disp: , Rfl:   •  b complex vitamins capsule, Take 1 capsule by mouth daily, Disp: , Rfl:   •  buPROPion (WELLBUTRIN XL) 300 mg 24 hr tablet, TAKE 1 TABLET BY MOUTH EVERY DAY IN THE MORNING, Disp: 90 tablet, Rfl: 3  •  calcium carbonate (OS-APRYL) 600 MG tablet, Take 600 mg by mouth 2 (two) times a day with meals, Disp: , Rfl:   •  cevimeline (EVOXAC) 30 MG capsule, Take 30 mg by mouth 3 (three) times a day, Disp: , Rfl:   •  Cholecalciferol (VITAMIN D3) 5000 units CAPS, Take by mouth, Disp: , Rfl:   •  desonide (DESOWEN) 0 05 % cream, Apply topically 2 (two) times a day (Patient taking differently: Apply topically as needed), Disp: 60 g, Rfl: 3  •  ferrous sulfate 324 (65 Fe) mg, Take 1 tablet (324 mg total) by mouth every other day, Disp: , Rfl:   •  folic acid (FOLVITE) 1 mg tablet, Take 3 tablets by mouth daily  , Disp: , Rfl:   •  glucosamine-chondroitin 500-400 MG tablet, Take 1 tablet by mouth in the morning, Disp: , Rfl:   •  HYDROcodone-acetaminophen (NORCO) 5-325 mg per tablet, Take 1 tablet by mouth every 8 (eight) hours as needed, Disp: , Rfl:   •  inFLIXimab (REMICADE IV), Infuse into a venous catheter Every 6 weeks  , Disp: , Rfl:   •  LORazepam (ATIVAN) 0 5 mg tablet, Take 1 tablet (0 5 mg total) by mouth daily at bedtime, Disp: 30 tablet, Rfl: 0  •  methotrexate 2 5 mg tablet, Take 2 5 mg by mouth once a week 8 pills weekly, Disp: , Rfl:   •  mometasone (NASONEX) 50 mcg/act nasal spray, SPRAY 2 SPRAYS INTO EACH NOSTRIL EVERY DAY, Disp: 17 g, Rfl: 2  •  omeprazole (PriLOSEC) 40 MG capsule, TAKE 1 CAPSULE (40 MG TOTAL) BY MOUTH DAILY  , Disp: 90 capsule, Rfl: 0  •  Probiotic Product (PROBIOTIC-10) CAPS, Take by mouth, Disp: , Rfl:   •  rivaroxaban (Xarelto) 20 mg tablet, Take 1 tablet (20 mg total) by mouth daily, Disp: 90 tablet, Rfl: 3  •  rosuvastatin (CRESTOR) 10 MG tablet, TAKE 1 TABLET BY MOUTH EVERY DAY, Disp: 90 tablet, Rfl: 1  •  sertraline (ZOLOFT) 100 mg tablet, TAKE 1 TABLET BY MOUTH EVERY DAY, Disp: 90 tablet, Rfl: 3  •  VITAMIN B COMPLEX-C PO, vitamin B complex  Take 1 tablet by mouth once daily  , Disp: , Rfl:   Allergies   Allergen Reactions   • Leflunomide Shortness Of Breath and Palpitations   • Penicillins Hives   • Ciprofloxacin Rash   • Codeine Itching and Rash   • Morphine Itching and Rash       Objective     Current Vitals:   Respirations: 16 (02/16/23 1334)  Height: 5' 3" (160 cm) (02/16/23 1334)  Weight - Scale: 96 6 kg (213 lb) (02/16/23 1334)    Physical Exam  Vitals and nursing note reviewed  Abdominal:      Comments: Abdomen is soft, nondistended and nontender    Incisions are well-healed without evidence of infection or incisional hernia

## 2023-02-17 ENCOUNTER — VBI (OUTPATIENT)
Dept: ADMINISTRATIVE | Facility: OTHER | Age: 53
End: 2023-02-17

## 2023-03-14 DIAGNOSIS — K21.9 GASTROESOPHAGEAL REFLUX DISEASE WITHOUT ESOPHAGITIS: ICD-10-CM

## 2023-03-14 RX ORDER — OMEPRAZOLE 40 MG/1
40 CAPSULE, DELAYED RELEASE ORAL DAILY
Qty: 90 CAPSULE | Refills: 0 | Status: SHIPPED | OUTPATIENT
Start: 2023-03-14

## 2023-04-06 DIAGNOSIS — K21.9 GASTROESOPHAGEAL REFLUX DISEASE WITHOUT ESOPHAGITIS: ICD-10-CM

## 2023-04-06 RX ORDER — OMEPRAZOLE 40 MG/1
40 CAPSULE, DELAYED RELEASE ORAL DAILY
Qty: 90 CAPSULE | Refills: 0 | Status: SHIPPED | OUTPATIENT
Start: 2023-04-06

## 2023-07-09 DIAGNOSIS — K21.9 GASTROESOPHAGEAL REFLUX DISEASE WITHOUT ESOPHAGITIS: ICD-10-CM

## 2023-07-09 RX ORDER — OMEPRAZOLE 40 MG/1
40 CAPSULE, DELAYED RELEASE ORAL DAILY
Qty: 90 CAPSULE | Refills: 0 | Status: SHIPPED | OUTPATIENT
Start: 2023-07-09

## 2023-07-10 DIAGNOSIS — F32.A DEPRESSION, UNSPECIFIED DEPRESSION TYPE: ICD-10-CM

## 2023-07-10 RX ORDER — SERTRALINE HYDROCHLORIDE 100 MG/1
TABLET, FILM COATED ORAL
Qty: 90 TABLET | Refills: 3 | Status: SHIPPED | OUTPATIENT
Start: 2023-07-10

## 2023-07-12 DIAGNOSIS — E78.49 OTHER HYPERLIPIDEMIA: ICD-10-CM

## 2023-07-12 RX ORDER — ROSUVASTATIN CALCIUM 10 MG/1
10 TABLET, COATED ORAL DAILY
Qty: 90 TABLET | Refills: 1 | Status: SHIPPED | OUTPATIENT
Start: 2023-07-12

## 2023-07-13 DIAGNOSIS — F32.A DEPRESSION, UNSPECIFIED DEPRESSION TYPE: ICD-10-CM

## 2023-07-14 RX ORDER — BUPROPION HYDROCHLORIDE 300 MG/1
300 TABLET ORAL EVERY MORNING
Qty: 90 TABLET | Refills: 3 | Status: SHIPPED | OUTPATIENT
Start: 2023-07-14

## 2023-08-07 ENCOUNTER — HOSPITAL ENCOUNTER (EMERGENCY)
Facility: HOSPITAL | Age: 53
Discharge: HOME | End: 2023-08-08
Attending: EMERGENCY MEDICINE
Payer: MEDICARE

## 2023-08-07 VITALS
DIASTOLIC BLOOD PRESSURE: 81 MMHG | RESPIRATION RATE: 20 BRPM | OXYGEN SATURATION: 98 % | SYSTOLIC BLOOD PRESSURE: 154 MMHG | TEMPERATURE: 98.5 F | HEART RATE: 91 BPM

## 2023-08-07 DIAGNOSIS — S01.511A LIP LACERATION, INITIAL ENCOUNTER: ICD-10-CM
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
## 2023-08-07 PROCEDURE — 99284 EMERGENCY DEPT VISIT MOD MDM: CPT

## 2023-08-08 PROCEDURE — 12011 RPR F/E/E/N/L/M 2.5 CM/<: CPT | Performed by: EMERGENCY MEDICINE

## 2023-08-08 PROCEDURE — 99284 EMERGENCY DEPT VISIT MOD MDM: CPT | Performed by: EMERGENCY MEDICINE

## 2023-08-08 PROCEDURE — NC001 PR NO CHARGE: Performed by: EMERGENCY MEDICINE

## 2023-08-08 RX ORDER — AMOXICILLIN AND CLAVULANATE POTASSIUM 875; 125 MG/1; MG/1
1 TABLET, FILM COATED ORAL EVERY 12 HOURS SCHEDULED
Qty: 13 TABLET | Refills: 0 | Status: SHIPPED | OUTPATIENT
Start: 2023-08-08 | End: 2023-08-15

## 2023-08-08 RX ORDER — LIDOCAINE HYDROCHLORIDE AND EPINEPHRINE 10; 10 MG/ML; UG/ML
10 INJECTION, SOLUTION INFILTRATION; PERINEURAL ONCE
Status: DISCONTINUED | OUTPATIENT
Start: 2023-08-08 | End: 2023-08-08 | Stop reason: HOSPADM

## 2023-08-08 RX ORDER — LIDOCAINE HYDROCHLORIDE 10 MG/ML
10 INJECTION, SOLUTION EPIDURAL; INFILTRATION; INTRACAUDAL; PERINEURAL ONCE
Status: COMPLETED | OUTPATIENT
Start: 2023-08-08 | End: 2023-08-08

## 2023-08-08 RX ORDER — AMOXICILLIN AND CLAVULANATE POTASSIUM 875; 125 MG/1; MG/1
1 TABLET, FILM COATED ORAL ONCE
Status: COMPLETED | OUTPATIENT
Start: 2023-08-08 | End: 2023-08-08

## 2023-08-08 RX ADMIN — LIDOCAINE HYDROCHLORIDE 10 ML: 10 INJECTION, SOLUTION EPIDURAL; INFILTRATION; INTRACAUDAL; PERINEURAL at 01:07

## 2023-08-08 RX ADMIN — AMOXICILLIN AND CLAVULANATE POTASSIUM 1 TABLET: 875; 125 TABLET, FILM COATED ORAL at 01:06

## 2023-08-08 NOTE — ED ATTENDING ATTESTATION
8/7/2023  I, Ami Haq MD, saw and evaluated the patient. I have discussed the patient with the resident/non-physician practitioner and agree with the resident's/non-physician practitioner's findings, Plan of Care, and MDM as documented in the resident's/non-physician practitioner's note, except where noted. All available labs and Radiology studies were reviewed. I was present for key portions of any procedure(s) performed by the resident/non-physician practitioner and I was immediately available to provide assistance. At this point I agree with the current assessment done in the Emergency Department. I have conducted an independent evaluation of this patient a history and physical is as follows:    59-year-old female presented for evaluation after dog bite to her lower lip this evening. Works as a . Reports dog is up-to-date on rabies vaccination. She is up-to-date on tetanus vaccination. Wound on the lower lip slightly traversing the vermilion border requiring suturing for cosmesis. ED Course  ED Course as of 08/08/23 0147   Tue Aug 08, 2023   0138 Would sutured by resident without complication. Will continue oral abx at home for infection prophylaxis.          Critical Care Time  Procedures

## 2023-08-08 NOTE — DISCHARGE INSTRUCTIONS
You have 3 absorbable sutures in your lip, these should dissolve on their own in approximately one week. Please return to the ED if you have worsening pain, swelling, or drainage from the area. Take the Augmentin for a total course of 7 days.
98
Bi-Rhombic Flap Text: The defect edges were debeveled with a #15 scalpel blade.  Given the location of the defect and the proximity to free margins a bi-rhombic flap was deemed most appropriate.  Using a sterile surgical marker, an appropriate rhombic flap was drawn incorporating the defect. The area thus outlined was incised deep to adipose tissue with a #15 scalpel blade.  The skin margins were undermined to an appropriate distance in all directions utilizing iris scissors.

## 2023-08-08 NOTE — ED PROVIDER NOTES
History  Chief Complaint   Patient presents with   • Dog Bite     Pt was bit by dog while helping out at vet clinic. Pt reports dog is UTD on vaccinations. Pt has lac to lower lip. HPI     Pt is a 46 y/o F with noncontributory past medical history presenting with a dog bite she sustained at work. 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 She says the dog was up to date on its immunizations particularly for rabies and she states she got her most recent Tdap approximately 2 years ago. Prior to arrival, she irrigated the area with water and chlorhexidine extensively with the vet's assistance. Denies any other injury or bites to other areas. Prior to Admission Medications   Prescriptions Last Dose Informant Patient Reported? Taking? Cholecalciferol (VITAMIN D3) 5000 units CAPS  Self Yes No   Sig: Take by mouth   HYDROcodone-acetaminophen (NORCO) 5-325 mg per tablet  Self Yes No   Sig: Take 1 tablet by mouth every 8 (eight) hours as needed   LORazepam (ATIVAN) 0.5 mg tablet  Self No No   Sig: Take 1 tablet (0.5 mg total) by mouth daily at bedtime   Probiotic Product (PROBIOTIC-10) CAPS  Self Yes No   Sig: Take by mouth   VITAMIN B COMPLEX-C PO  Self Yes No   Sig: vitamin B complex   Take 1 tablet by mouth once daily.    albuterol (PROVENTIL HFA,VENTOLIN HFA) 90 mcg/act inhaler  Self Yes No   Sig: Inhale 2 puffs every 6 (six) hours as needed for wheezing   b complex vitamins capsule  Self Yes No   Sig: Take 1 capsule by mouth daily   buPROPion (WELLBUTRIN XL) 300 mg 24 hr tablet   No No   Sig: Take 1 tablet (300 mg total) by mouth every morning   calcium carbonate (OS-APRYL) 600 MG tablet  Self Yes No   Sig: Take 600 mg by mouth 2 (two) times a day with meals   cevimeline (EVOXAC) 30 MG capsule  Self Yes No   Sig: Take 30 mg by mouth 3 (three) times a day   cycloSPORINE (RESTASIS) 0.05 % ophthalmic emulsion  Self Yes No   desonide (DESOWEN) 0.05 % cream  Self No No   Sig: Apply topically 2 (two) times a day   Patient taking differently: Apply topically as needed   ferrous sulfate 324 (65 Fe) mg  Self No No   Sig: Take 1 tablet (324 mg total) by mouth every other day   folic acid (FOLVITE) 1 mg tablet  Self Yes No   Sig: Take 3 tablets by mouth daily     glucosamine-chondroitin 500-400 MG tablet  Self Yes No   Sig: Take 1 tablet by mouth in the morning   inFLIXimab (REMICADE IV)  Self Yes No   Sig: Infuse into a venous catheter Every 6 weeks. methotrexate 2.5 mg tablet  Self Yes No   Sig: Take 2.5 mg by mouth once a week 8 pills weekly   mometasone (NASONEX) 50 mcg/act nasal spray  Self No No   Sig: SPRAY 2 SPRAYS INTO EACH NOSTRIL EVERY DAY   omeprazole (PriLOSEC) 40 MG capsule   No No   Sig: TAKE 1 CAPSULE (40 MG TOTAL) BY MOUTH DAILY.    rivaroxaban (Xarelto) 20 mg tablet  Self No No   Sig: Take 1 tablet (20 mg total) by mouth daily   rosuvastatin (CRESTOR) 10 MG tablet   No No   Sig: Take 1 tablet (10 mg total) by mouth daily   sertraline (ZOLOFT) 100 mg tablet   No No   Sig: TAKE 1 TABLET BY MOUTH EVERY DAY      Facility-Administered Medications: None       Past Medical History:   Diagnosis Date   • Anxiety    • Arthritis    • Asthma     when sick   • Colon polyp    • CRPS (complex regional pain syndrome)    • Depression    • DVT (deep venous thrombosis) ([facility])    • GERD (gastroesophageal reflux disease)    • H/O total hysterectomy    • History of transfusion     2017 - no adverse reaction   • Hyperlipidemia    • Inflammatory spondylopathies (HCC)    • Nonmelanoma skin cancer     LAST ASSESSED: [id number]   • PE (pulmonary thromboembolism) ([facility])    • PONV (postoperative nausea and vomiting)    • Prediabetes    • Squamous cell carcinoma    • Squamous cell skin cancer 2020    nose    • Urinary incontinence    • Uterine leiomyoma     LAST ASSESSED: [id number]   • Wears reading eyeglasses        Past Surgical History:   Procedure Laterality Date   • ANKLE SURGERY     • BACK SURGERY  2018   • BELOW KNEE LEG AMPUTATION      LAST ASSESSED: [id number]   • BUNIONECTOMY Right    •  SECTION     • CHOLECYSTECTOMY LAPAROSCOPIC N/A 2023    Procedure: CHOLECYSTECTOMY LAPAROSCOPIC;  Surgeon: Bryce Paulino MD;  Location: [facility];  Service: General   • HYSTERECTOMY      LAST ASSESSED: [id number]   • OTHER SURGICAL HISTORY      SPINAL STEROTAXIS STIMULATION OF CORD; LAST ASSESSED: [id number]   • VT COLONOSCOPY FLX DX W/COLLJ SPEC WHEN PFRMD N/A 2018    Procedure: COLONOSCOPY;  Surgeon: Enedina Yeung MD;  Location: [facility]; Service: Gastroenterology   • VT ESOPHAGOGASTRODUODENOSCOPY TRANSORAL DIAGNOSTIC N/A 2018    Procedure: ESOPHAGOGASTRODUODENOSCOPY (EGD); Surgeon: Enedina Yeung MD;  Location: [facility]; Service: Gastroenterology   • VT LAPAROSCOPY W/RMVL ADNEXAL STRUCTURES N/A 11/10/2017    Procedure: LAPAROSCOPIC REMOVAL OF BILATERAL OVARIES;  LYSIS OF ADHESIONS;  Surgeon: Shala Carpenter MD;  Location: [facility];  Service: Gynecology   • SINUS SURGERY     • SPINAL CORD STIMULATOR IMPLANT         Family History   Problem Relation Age of Onset   • Hypertension Mother    • Heart disease Mother    • Coronary artery disease Mother         Due to calcified coronary lesion    • Diabetes Father         Type 2, controlled with neuropathy    • Vitiligo Brother    • Breast cancer Cousin 28   • Breast cancer Other    • Vitiligo Daughter      I have reviewed and agree with the history as documented.     E-Cigarette/Vaping   • E-Cigarette Use Never User      E-Cigarette/Vaping Substances   • Nicotine No    • THC No    • CBD No    • Flavoring No    • Other No    • Unknown No      Social History     Tobacco Use   • Smoking status: Never   • Smokeless tobacco: Never   Vaping Use   • Vaping Use: Never used   Substance Use Topics   • Alcohol use: No   • Drug use: No        Review of Systems   Constitutional: Negative for chills and fever.   HENT: Negative for ear pain and sore throat. Eyes: Negative for pain and visual disturbance. Respiratory: Negative for cough and shortness of breath. Cardiovascular: Negative for chest pain and palpitations. Gastrointestinal: Negative for abdominal pain and vomiting. Genitourinary: Negative for dysuria and hematuria. Musculoskeletal: Negative for arthralgias and back pain. Skin: Positive for wound. Negative for color change and rash. Neurological: Negative for seizures and syncope. All other systems reviewed and are negative. Physical Exam  ED Triage Vitals [08/07/23 2235]   Temperature Pulse Respirations Blood Pressure SpO2   98.5 °F (36.9 °C) 91 20 154/81 98 %      Temp Source Heart Rate Source Patient Position - Orthostatic VS BP Location FiO2 (%)   Oral Monitor Sitting Right arm --      Pain Score       --             Orthostatic Vital Signs  Vitals:    08/07/23 2235   BP: 154/81   Pulse: 91   Patient Position - Orthostatic VS: Sitting       Physical Exam  Vitals and nursing note reviewed. Constitutional:       General: She is not in acute distress. Appearance: She is well-developed. HENT:      Head: Normocephalic and atraumatic. Eyes:      Conjunctiva/sclera: Conjunctivae normal.   Cardiovascular:      Rate and Rhythm: Normal rate and regular rhythm. Pulmonary:      Effort: Pulmonary effort is normal. No respiratory distress. Abdominal:      Palpations: Abdomen is soft. Tenderness: There is no abdominal tenderness. Musculoskeletal:         General: No swelling. Cervical back: Neck supple. Skin:     General: Skin is warm and dry. Capillary Refill: Capillary refill takes less than 2 seconds. Findings: Laceration present. Comments: Small 2cm laceration to anterior bottom lip traversing vermillion border, bleeding controlled   Neurological:      Mental Status: She is alert.    Psychiatric:         Mood and Affect: Mood normal.         ED Medications  Medications   amoxicillin-clavulanate (AUGMENTIN) 875-125 mg per tablet 1 tablet (1 tablet Oral Given 8/8/23 0106)   lidocaine (PF) (XYLOCAINE-MPF) 1 % injection 10 mL (10 mL Infiltration Given 8/8/23 0107)       Diagnostic Studies  Results Reviewed     None                 No orders to display         Procedures  Universal Protocol:  Consent: Verbal consent obtained. Risks and benefits: risks, benefits and alternatives were discussed  Consent given by: patient    Laceration repair    Date/Time: 8/8/2023 1:30 AM    Performed by: Ollie Moe DO  Authorized by: Ollie Moe DO  Body area: mouth  Location details: lower lip, interior  Laceration length: 2 cm  Tendon involvement: none  Nerve involvement: none  Anesthesia: local infiltration    Anesthesia:  Local Anesthetic: lidocaine 1% with epinephrine  Anesthetic total: 2 mL    Sedation:  Patient sedated: no      Wound Dehiscence:  Superficial Wound Dehiscence: simple closure      Procedure Details:  Irrigation solution: tap water  Amount of cleaning: standard  Debridement: none  Mucous membrane closure: 5-0 Chromic gut  Number of sutures: 3  Technique: simple  Approximation: close  Approximation difficulty: simple            ED Course                             SBIRT 22yo+    Flowsheet Row Most Recent Value   Initial Alcohol Screen: US AUDIT-C     1. How often do you have a drink containing alcohol? 2 Filed at: 08/08/2023 0007   2. How many drinks containing alcohol do you have on a typical day you are drinking? 1 Filed at: 08/08/2023 0007   3a. Male UNDER 65: How often do you have five or more drinks on one occasion? 0 Filed at: 08/08/2023 0007   3b. FEMALE Any Age, or MALE 65+: How often do you have 4 or more drinks on one occassion? 0 Filed at: 08/08/2023 0007   Audit-C Score 3 Filed at: 08/08/2023 0007   JOEL: How many times in the past year have you. ..     Used an illegal drug or used a prescription medication for non-medical reasons? Never Filed at: 08/08/2023 0007                Medical Decision Making  44 y/o F presenting with dog bite/laceration. On initial evaluation, laceration was clean and bleeding appeared controlled. No other injuries seen on exam. Given pt is up to date on vaccinations and dog was as well, rabies and Tdap deferred at this time. Given risk for infection given dog's poor dentition, initial dose of augmentin was given in ED and the wound was subsequently cleaned and closed with chromic gut suture. The patient tolerated the procedure well and was ultimately discharged in stable condition with course of amoxicillin for infection prophylaxis. Dog bite, initial encounter: acute illness or injury  Lip laceration, initial encounter: acute illness or injury  Risk  Prescription drug management. Disposition  Final diagnoses:   Dog bite, initial encounter   Lip laceration, initial encounter     Time reflects when diagnosis was documented in both MDM as applicable and the Disposition within this note     Time User Action Codes Description Comment    8/8/2023  1:34 AM [employer] Files Add Cosme.Conrad. [id number]] Dog bite, initial encounter     8/8/2023  1:34 AM [employer] Files Add [[id number]] Lip laceration, initial encounter       ED Disposition     ED Disposition   Discharge    Condition   Stable    Date/Time   Tue Aug 8, 2023  1:34 AM    Comment   Gerson Castano discharge to home/self care.                Follow-up Information     Follow up With Specialties Details Why Contact Info    Sirena Mckeon MD Internal Medicine, [facility] Services, Palliative Care Call  As needed [address] [facility] [address] [facility]  [phone]            Discharge Medication List as of 8/8/2023  1:40 AM      START taking these medications    Details   amoxicillin-clavulanate (AUGMENTIN) 875-125 mg per tablet Take 1 tablet by mouth every 12 (twelve) hours for 7 days, Starting Tue 8/8/2023, Until Tue 8/15/2023, Normal         CONTINUE these medications which have NOT CHANGED    Details   albuterol (PROVENTIL HFA,VENTOLIN HFA) 90 mcg/act inhaler Inhale 2 puffs every 6 (six) hours as needed for wheezing, Historical Med      b complex vitamins capsule Take 1 capsule by mouth daily, Historical Med      buPROPion (WELLBUTRIN XL) 300 mg 24 hr tablet Take 1 tablet (300 mg total) by mouth every morning, Starting Fri 7/14/2023, Normal      calcium carbonate (OS-APRYL) 600 MG tablet Take 600 mg by mouth 2 (two) times a day with meals, Historical Med      cevimeline (EVOXAC) 30 MG capsule Take 30 mg by mouth 3 (three) times a day, Starting Wed 1/19/2022, Historical Med      Cholecalciferol (VITAMIN D3) 5000 units CAPS Take by mouth, Historical Med      cycloSPORINE (RESTASIS) 0.05 % ophthalmic emulsion Starting Thu 2/16/2023, Historical Med      desonide (DESOWEN) 0.05 % cream Apply topically 2 (two) times a day, Starting Wed 2/27/2019, Normal      ferrous sulfate 324 (65 Fe) mg Take 1 tablet (324 mg total) by mouth every other day, Starting Thu 11/7/2019, No Print      folic acid (FOLVITE) 1 mg tablet Take 3 tablets by mouth daily  , Historical Med      glucosamine-chondroitin 500-400 MG tablet Take 1 tablet by mouth in the morning, Historical Med      HYDROcodone-acetaminophen (NORCO) 5-325 mg per tablet Take 1 tablet by mouth every 8 (eight) hours as needed, Starting Thu 9/9/2021, Historical Med      inFLIXimab (REMICADE IV) Infuse into a venous catheter Every 6 weeks. , Historical Med      LORazepam (ATIVAN) 0.5 mg tablet Take 1 tablet (0.5 mg total) by mouth daily at bedtime, Starting Fri 6/24/2022, Normal      methotrexate 2.5 mg tablet Take 2.5 mg by mouth once a week 8 pills weekly, Historical Med      mometasone (NASONEX) 50 mcg/act nasal spray SPRAY 2 SPRAYS INTO EACH NOSTRIL EVERY DAY, Normal      omeprazole (PriLOSEC) 40 MG capsule TAKE 1 CAPSULE (40 MG TOTAL) BY MOUTH DAILY. , Starting Sun 7/9/2023, Normal Probiotic Product (PROBIOTIC-10) CAPS Take by mouth, Historical Med      rivaroxaban (Xarelto) 20 mg tablet Take 1 tablet (20 mg total) by mouth daily, Starting Thu 2/9/2023, No Print      rosuvastatin (CRESTOR) 10 MG tablet Take 1 tablet (10 mg total) by mouth daily, Starting Wed 7/12/2023, Normal      sertraline (ZOLOFT) 100 mg tablet TAKE 1 TABLET BY MOUTH EVERY DAY, Normal      VITAMIN B COMPLEX-C PO vitamin B complex   Take 1 tablet by mouth once daily. , Historical Med           No discharge procedures on file. PDMP Review       Value Time User    PDMP Reviewed  Yes 6/24/2022 11:58 AM Easton Hensley MD           ED Provider  Attending physically available and evaluated Clarisa Lipscomb. I managed the patient along with the ED Attending.     Electronically Signed by         Mirella García DO  08/08/23 1234

## 2023-08-23 ENCOUNTER — OFFICE VISIT (OUTPATIENT)
Age: 53
End: 2023-08-23
Payer: MEDICARE

## 2023-08-23 VITALS
SYSTOLIC BLOOD PRESSURE: 118 MMHG | HEIGHT: 63 IN | BODY MASS INDEX: 37.56 KG/M2 | OXYGEN SATURATION: 99 % | DIASTOLIC BLOOD PRESSURE: 84 MMHG | WEIGHT: 212 LBS | HEART RATE: 89 BPM

## 2023-08-23 DIAGNOSIS — I20.8 EXERTIONAL ANGINA (HCC): ICD-10-CM

## 2023-08-23 DIAGNOSIS — Z13.6 ENCOUNTER FOR LIPID SCREENING FOR CARDIOVASCULAR DISEASE: ICD-10-CM

## 2023-08-23 DIAGNOSIS — Z13.29 SCREENING FOR THYROID DISORDER: ICD-10-CM

## 2023-08-23 DIAGNOSIS — F33.9 EPISODE OF RECURRENT MAJOR DEPRESSIVE DISORDER, UNSPECIFIED DEPRESSION EPISODE SEVERITY (HCC): ICD-10-CM

## 2023-08-23 DIAGNOSIS — K51.40 PSEUDOPOLYPOSIS OF COLON WITHOUT COMPLICATION, UNSPECIFIED PART OF COLON (HCC): ICD-10-CM

## 2023-08-23 DIAGNOSIS — I26.99 OTHER PULMONARY EMBOLISM WITHOUT ACUTE COR PULMONALE, UNSPECIFIED CHRONICITY (HCC): ICD-10-CM

## 2023-08-23 DIAGNOSIS — Z12.31 ENCOUNTER FOR SCREENING MAMMOGRAM FOR MALIGNANT NEOPLASM OF BREAST: ICD-10-CM

## 2023-08-23 DIAGNOSIS — Z13.220 ENCOUNTER FOR LIPID SCREENING FOR CARDIOVASCULAR DISEASE: ICD-10-CM

## 2023-08-23 DIAGNOSIS — D68.59 HYPERCOAGULABLE STATE (HCC): ICD-10-CM

## 2023-08-23 DIAGNOSIS — F11.20 CONTINUOUS OPIOID DEPENDENCE (HCC): ICD-10-CM

## 2023-08-23 DIAGNOSIS — E11.9 TYPE 2 DIABETES MELLITUS WITHOUT COMPLICATION, WITHOUT LONG-TERM CURRENT USE OF INSULIN (HCC): Primary | ICD-10-CM

## 2023-08-23 DIAGNOSIS — Z13.0 SCREENING FOR IRON DEFICIENCY ANEMIA: ICD-10-CM

## 2023-08-23 PROCEDURE — G0439 PPPS, SUBSEQ VISIT: HCPCS

## 2023-08-23 PROCEDURE — 99214 OFFICE O/P EST MOD 30 MIN: CPT

## 2023-08-23 NOTE — PROGRESS NOTES
Assessment and Plan:   1. Type 2 diabetes mellitus without complication, without long-term current use of insulin (HCC)  Due for labs, HGA1C   Does not check blood sugar  Limit carbs and sweets in diet. No formal exercise but works on the [employer] due    2. Pseudopolyposis of colon without complication, unspecified part of colon ([address])  Colonoscopy every 3-5 years. 2021 last completed  Normal BMs    3. Other pulmonary embolism without acute cor pulmonale, unspecified chronicity (HCC)  Anticoagulant status with Xarelto    4. Continuous opioid dependence (HCC)  Chronic pain   [place] or [place]  reviewed: No red flags were identified;    5. Episode of recurrent major depressive disorder, unspecified depression episode severity ([address])  Currently stable. Continue on bupropion 300 mg daily    6. Hypercoagulable state ([address])  Xarelto use due to history of PE    7.  Exertional angina (HCC)  stable    Problem List Items Addressed This Visit        Digestive    Pseudopolyposis of colon without complication, unspecified part of colon ([address])       Endocrine    Type 2 diabetes mellitus without complication, without long-term current use of insulin ([address]) - Primary    Relevant Orders    Comprehensive metabolic panel    Hemoglobin A1C    Comprehensive metabolic panel    Hemoglobin A1C       Cardiovascular and Mediastinum    Pulmonary embolism (HCC)    Exertional angina ([address])       Other    Episode of recurrent major depressive disorder, unspecified depression episode severity ([address])    Continuous opioid dependence ([address])    Hypercoagulable state ([address])   Other Visit Diagnoses     Encounter for screening mammogram for malignant neoplasm of breast        Relevant Orders    Mammo screening bilateral w 3d & cad    Screening for iron deficiency anemia        Relevant Orders    CBC and differential    CBC and differential    Encounter for lipid screening for cardiovascular disease        Relevant Orders    Lipid Panel with Direct LDL reflex Lipid panel    Screening for thyroid disorder        Relevant Orders    TSH, 3rd generation with Free T4 reflex    TSH, 3rd generation with Free T4 reflex           Preventive health issues were discussed with patient, and age appropriate screening tests were ordered as noted in patient's After Visit Summary. Personalized health advice and appropriate referrals for health education or preventive services given if needed, as noted in patient's After Visit Summary. History of Present Illness:     Patient presents for a Medicare Wellness Visit    Marco Antonio Romero is here today for her Medicare wellness. Overall she is feeling well and has no new complaints to offer today. She is taking all of her medications as prescribed. She is due for labs to check A1c as well as lipids. She does follow with rheumatology for ankylosing spondylitis. She is doing well on methotrexate and Remicade. Patient Care Team:  Rene Moyer MD as PCP - General (Internal Medicine)  MD Deena Polanco MD as Endoscopist  Ruth Keith PA-C (Gastroenterology)  Ken Antoine PA-C as Physician Assistant (Gastroenterology)     Review of Systems:     Review of Systems   Constitutional: Negative for appetite change, chills, diaphoresis, fatigue, fever and unexpected weight change. HENT: Negative for postnasal drip and sneezing. Eyes: Negative for visual disturbance. Respiratory: Negative for chest tightness and shortness of breath. Cardiovascular: Negative for chest pain, palpitations and leg swelling. Gastrointestinal: Negative for abdominal pain and blood in stool. Endocrine: Negative for cold intolerance, heat intolerance, polydipsia, polyphagia and polyuria. Genitourinary: Negative for difficulty urinating, dysuria, frequency and urgency. Musculoskeletal: Positive for gait problem. Negative for arthralgias and myalgias. Skin: Negative for rash and wound.    Neurological: Negative for dizziness, weakness, light-headedness and headaches. Hematological: Negative for adenopathy. Psychiatric/Behavioral: Negative for confusion, dysphoric mood and sleep disturbance. The patient is not nervous/anxious.          Problem List:     Patient Active Problem List   Diagnosis   • Inflammatory spondylopathy of lumbosacral region ([address])   • Hyperlipidemia   • HLA B27 (HLA B27 positive)   • Elevated liver enzymes   • Depression   • Autoimmune disease ([address])   • Pulmonary embolism (HCC)   • RSD (reflex sympathetic dystrophy)   • Unknown skin lesion   • Excoriation (skin-picking) disorder   • Iron deficiency anemia due to chronic blood loss   • Iron deficiency anemia   • Gastroesophageal reflux disease without esophagitis   • Health maintenance examination   • Tubular adenoma   • Type 2 diabetes mellitus without complication, without long-term current use of insulin (HCC)   • Other specified disorders involving the immune mechanism, not elsewhere classified ([address])   • Episode of recurrent major depressive disorder, unspecified depression episode severity ([address])   • Pseudopolyposis of colon without complication, unspecified part of colon ([address])   • Obesity, morbid ([address])   • Exertional angina ([address])   • Continuous opioid dependence ([address])   • Abdominal pain   • Chest pain   • Chronic cholecystitis   • PONV (postoperative nausea and vomiting)   • Fatty liver   • History of hysterectomy   • Arthritis   • Hypercoagulable state ([address])   • Postoperative visit      Past Medical and Surgical History:     Past Medical History:   Diagnosis Date   • Anxiety    • Arthritis    • Asthma     when sick   • Colon polyp    • CRPS (complex regional pain syndrome)    • Depression    • DVT (deep venous thrombosis) ([facility])    • GERD (gastroesophageal reflux disease)    • H/O total hysterectomy    • History of transfusion     2017 - no adverse reaction   • Hyperlipidemia    • Inflammatory spondylopathies ([address])    • Nonmelanoma skin cancer     LAST ASSESSED: [id number]   • PE (pulmonary thromboembolism) ([facility])    • PONV (postoperative nausea and vomiting)    • Prediabetes    • Squamous cell carcinoma    • Squamous cell skin cancer     nose    • Urinary incontinence    • Uterine leiomyoma     LAST ASSESSED:    • Wears reading eyeglasses      Past Surgical History:   Procedure Laterality Date   • ANKLE SURGERY     • BACK SURGERY  2018   • BELOW KNEE LEG AMPUTATION      LAST ASSESSED: [id number]   • BUNIONECTOMY Right    •  SECTION     • CHOLECYSTECTOMY LAPAROSCOPIC N/A 2023    Procedure: CHOLECYSTECTOMY LAPAROSCOPIC;  Surgeon: Dann Gerber MD;  Location: [facility];  Service: General   • HYSTERECTOMY      LAST ASSESSED:    • OTHER SURGICAL HISTORY      SPINAL STEROTAXIS STIMULATION OF CORD; LAST ASSESSED: [id number]   • NE COLONOSCOPY FLX DX W/COLLJ SPEC WHEN PFRMD N/A 2018    Procedure: COLONOSCOPY;  Surgeon: Mallory Garay MD;  Location: [facility]; Service: Gastroenterology   • NE ESOPHAGOGASTRODUODENOSCOPY TRANSORAL DIAGNOSTIC N/A 2018    Procedure: ESOPHAGOGASTRODUODENOSCOPY (EGD); Surgeon: Mallory Garay MD;  Location: [facility];   Service: Gastroenterology   • NE LAPAROSCOPY W/RMVL ADNEXAL STRUCTURES N/A 11/10/2017    Procedure: LAPAROSCOPIC REMOVAL OF BILATERAL OVARIES;  LYSIS OF ADHESIONS;  Surgeon: Flavio Heimlich, MD;  Location: [facility];  Service: Gynecology   • SINUS SURGERY     • SPINAL CORD STIMULATOR IMPLANT        Family History:     Family History   Problem Relation Age of Onset   • Hypertension Mother    • Heart disease Mother    • Coronary artery disease Mother         Due to calcified coronary lesion    • Diabetes Father         Type 2, controlled with neuropathy    • Vitiligo Brother    • Breast cancer Cousin 28   • Breast cancer Other    • Vitiligo Daughter       Social History:     Social History     Socioeconomic History   • Marital status: Single     Spouse name: None   • Number of children: None   • Years of education: None   • Highest education level: None   Occupational History   • Occupation: Retired   Tobacco Use   • Smoking status: Never   • Smokeless tobacco: Never   Vaping Use   • Vaping Use: Never used   Substance and Sexual Activity   • Alcohol use: No   • Drug use: No   • Sexual activity: Yes     Partners: Male   Other Topics Concern   • None   Social History Narrative   • None     Social Determinants of Health     Financial Resource Strain: Medium Risk (8/23/2023)    Overall Financial Resource Strain (CARDIA)    • Difficulty of Paying Living Expenses: Somewhat hard   Food Insecurity: Not on file   Transportation Needs: No Transportation Needs (8/23/2023)    PRAPARE - Transportation    • Lack of Transportation (Medical): No    • Lack of Transportation (Non-Medical):  No   Physical Activity: Inactive (2/27/2019)    Exercise Vital Sign    • Days of Exercise per Week: 0 days    • Minutes of Exercise per Session: 0 min   Stress: Stress Concern Present (2/27/2019)    [facility]    • Feeling of Stress : Rather much   Social Connections: Not on file   Intimate Partner Violence: Not on file   Housing Stability: Not on file      Medications and Allergies:     Current Outpatient Medications   Medication Sig Dispense Refill   • albuterol (PROVENTIL HFA,VENTOLIN HFA) 90 mcg/act inhaler Inhale 2 puffs every 6 (six) hours as needed for wheezing     • b complex vitamins capsule Take 1 capsule by mouth daily     • buPROPion (WELLBUTRIN XL) 300 mg 24 hr tablet Take 1 tablet (300 mg total) by mouth every morning 90 tablet 3   • calcium carbonate (OS-APRYL) 600 MG tablet Take 600 mg by mouth 2 (two) times a day with meals     • cevimeline (EVOXAC) 30 MG capsule Take 30 mg by mouth 3 (three) times a day     • Cholecalciferol (VITAMIN D3) 5000 units CAPS Take by mouth     • cycloSPORINE (RESTASIS) 0.05 % ophthalmic emulsion      • desonide (DESOWEN) 0.05 % cream Apply topically 2 (two) times a day (Patient taking differently: Apply topically as needed) 60 g 3   • ferrous sulfate 324 (65 Fe) mg Take 1 tablet (324 mg total) by mouth every other day     • folic acid (FOLVITE) 1 mg tablet Take 3 tablets by mouth daily       • glucosamine-chondroitin 500-400 MG tablet Take 1 tablet by mouth in the morning     • HYDROcodone-acetaminophen (NORCO) 5-325 mg per tablet Take 1 tablet by mouth every 8 (eight) hours as needed     • inFLIXimab (REMICADE IV) Infuse into a venous catheter Every 6 weeks. • LORazepam (ATIVAN) 0.5 mg tablet Take 1 tablet (0.5 mg total) by mouth daily at bedtime 30 tablet 0   • methotrexate 2.5 mg tablet Take 2.5 mg by mouth once a week Injection once a week 1cc     • mometasone (NASONEX) 50 mcg/act nasal spray SPRAY 2 SPRAYS INTO EACH NOSTRIL EVERY DAY 17 g 2   • omeprazole (PriLOSEC) 40 MG capsule TAKE 1 CAPSULE (40 MG TOTAL) BY MOUTH DAILY. 90 capsule 0   • Probiotic Product (PROBIOTIC-10) CAPS Take by mouth     • rivaroxaban (Xarelto) 20 mg tablet Take 1 tablet (20 mg total) by mouth daily 90 tablet 3   • rosuvastatin (CRESTOR) 10 MG tablet Take 1 tablet (10 mg total) by mouth daily 90 tablet 1   • sertraline (ZOLOFT) 100 mg tablet TAKE 1 TABLET BY MOUTH EVERY DAY 90 tablet 3   • VITAMIN B COMPLEX-C PO vitamin B complex   Take 1 tablet by mouth once daily. No current facility-administered medications for this visit.      Allergies   Allergen Reactions   • Leflunomide Shortness Of Breath and Palpitations   • Penicillins Hives   • Ciprofloxacin Rash   • Codeine Itching and Rash   • Morphine Itching and Rash      Immunizations:     Immunization History   Administered Date(s) Administered   • INFLUENZA 11/07/2015, 11/16/2016, 10/05/2017, 10/25/2018, 10/26/2019, 10/31/2020   • Influenza Quadrivalent 3 years and older 11/01/2019   • Influenza Quadrivalent, 6-35 Months IM 11/16/2016   • Influenza, seasonal, injectable 10/07/2010, 10/05/2017   • Pneumococcal Polysaccharide PPV23 04/12/2019   • Tdap 10/30/2017, 07/18/2022   • influenza, injectable, quadrivalent 11/01/2019      Health Maintenance:         Topic Date Due   • Breast Cancer Screening: Mammogram  04/16/2020   • Colorectal Cancer Screening  11/02/2024   • HIV Screening  Completed   • Hepatitis C Screening  Completed         Topic Date Due   • COVID-19 Vaccine (1) Never done   • Influenza Vaccine (1) 09/01/2023      Medicare Screening Tests and Risk Assessments:     Avery Abel is here for her Subsequent Wellness visit. Last Medicare Wellness visit information reviewed, patient interviewed, no change since last AWV. Health Risk Assessment:   Patient rates overall health as fair. Patient feels that their physical health rating is same. Patient is satisfied with their life. Eyesight was rated as same. Hearing was rated as same. Patient feels that their emotional and mental health rating is same. Patients states they are never, rarely angry. Patient states they are never, rarely unusually tired/fatigued. Pain experienced in the last 7 days has been some. Patient states that she has experienced no weight loss or gain in last 6 months. Depression Screening:   PHQ-9 Score: 0      Fall Risk Screening: In the past year, patient has experienced: no history of falling in past year      Urinary Incontinence Screening:   Patient has not leaked urine accidently in the last six months. Home Safety:  Patient does not have trouble with stairs inside or outside of their home. Patient has working smoke alarms and has working carbon monoxide detector. Home safety hazards include: none. Nutrition:   Current diet is Regular. Medications:   Patient is currently taking over-the-counter supplements. OTC medications include: see medication list. Patient is able to manage medications.      Activities of Daily Living (ADLs)/Instrumental Activities of Daily Living (IADLs):   Walk and transfer into and out of bed and chair?: Yes  Dress and groom yourself?: Yes    Bathe or shower yourself?: Yes    Feed yourself? Yes  Do your laundry/housekeeping?: Yes  Manage your money, pay your bills and track your expenses?: Yes  Make your own meals?: Yes    Do your own shopping?: Yes    Previous Hospitalizations:   Any hospitalizations or ED visits within the last 12 months?: Yes    How many hospitalizations have you had in the last year?: 1-2    Advance Care Planning:   Living will: Yes    Advanced directive: Yes    End of Life Decisions reviewed with patient: Yes    Provider agrees with end of life decisions: Yes      PREVENTIVE SCREENINGS      Cardiovascular Screening:    General: Screening Not Indicated and History Lipid Disorder      Diabetes Screening:     General: Screening Not Indicated and History Diabetes      Colorectal Cancer Screening:     General: Screening Current      Cervical Cancer Screening:    General: Screening Not Indicated      Lung Cancer Screening:     General: Screening Not Indicated      Hepatitis C Screening:    General: Screening Current    Screening, Brief Intervention, and Referral to Treatment (SBIRT)    Screening  Typical number of drinks in a day: 0  Typical number of drinks in a week: 0  Interpretation: Low risk drinking behavior. Single Item Drug Screening:  How often have you used an illegal drug (including marijuana) or a prescription medication for non-medical reasons in the past year? never    Single Item Drug Screen Score: 0  Interpretation: Negative screen for possible drug use disorder    Review of Current Opioid Use    Opioid Risk Tool (ORT) Interpretation: Complete Opioid Risk Tool (ORT)    No results found.      Physical Exam:     /84 (BP Location: Left arm, Patient Position: Sitting, Cuff Size: Standard)   Pulse 89   Ht 5' 3" (1.6 m)   Wt 96.2 kg (212 lb)   SpO2 99%   BMI 37.55 kg/m²     Physical Exam  Constitutional:       Appearance: She is well-developed. HENT:      Head: Normocephalic and atraumatic. Eyes:      Pupils: Pupils are equal, round, and reactive to light. Neck:      Thyroid: No thyromegaly. Cardiovascular:      Rate and Rhythm: Normal rate and regular rhythm. Pulses: no weak pulses          Dorsalis pedis pulses are 2+ on the right side. Posterior tibial pulses are 2+ on the right side. Heart sounds: No murmur heard. Pulmonary:      Effort: Pulmonary effort is normal.      Breath sounds: Normal breath sounds. Abdominal:      General: Bowel sounds are normal.      Palpations: Abdomen is soft. Musculoskeletal:         General: Normal range of motion. Cervical back: Normal range of motion and neck supple. Feet:      Right foot:      Skin integrity: No ulcer, skin breakdown, erythema, warmth, callus or dry skin. Left foot: amputated  Lymphadenopathy:      Cervical: No cervical adenopathy. Skin:     General: Skin is warm and dry. Neurological:      Mental Status: She is alert and oriented to person, place, and time. Diabetic Foot Exam    Patient's shoes and socks removed. Right Foot/Ankle   Right Foot Inspection  Skin Exam: skin normal and skin intact. No dry skin, no warmth, no callus, no erythema, no maceration, no abnormal color, no pre-ulcer, no ulcer and no callus. Toe Exam: ROM and strength within normal limits. Sensory   Vibration: intact  Monofilament testing: intact    Vascular  Capillary refills: < 3 seconds  The right DP pulse is 2+. The right PT pulse is 2+.      Left Foot/Ankle  Left Foot Inspection  Amputation: amputation left foot     Assign Risk Category  No deformity present  No loss of protective sensation  No weak pulses  Risk: Matthewland Roda Fothergill

## 2023-08-23 NOTE — PATIENT INSTRUCTIONS
Type 2 Diabetes Management for Adults   AMBULATORY CARE:   Type 2 diabetes  is a disease that affects how your body uses glucose (sugar). Either your body cannot make enough insulin, or it cannot use the insulin correctly. It is important to keep diabetes controlled to prevent damage to your heart, blood vessels, and other organs. Management will help you feel well and enjoy your daily activities. Your diabetes care team providers can help you make a plan to fit diabetes care into your schedule. Your plan can change over time to fit your needs and your family's needs. Have someone call your local emergency number (911 in the [address]) if:   • You cannot be woken. • You have signs of diabetic ketoacidosis:     ? confusion, fatigue    ? vomiting    ? rapid heartbeat    ? fruity smelling breath    ? extreme thirst    ? dry mouth and skin    • You have any of the following signs of a heart attack:      ? Squeezing, pressure, or pain in your chest    ? You may  also have any of the following:     - Discomfort or pain in your back, neck, jaw, stomach, or arm    - Shortness of breath    - Nausea or vomiting    - Lightheadedness or a sudden cold sweat    • You have any of the following signs of a stroke:      ? Numbness or drooping on one side of your face     ? Weakness in an arm or leg    ? Confusion or difficulty speaking    ? Dizziness, a severe headache, or vision loss    Call your doctor or diabetes care team provider if:   • You have a sore or wound that will not heal.    • You have a change in the amount you urinate. • Your blood sugar levels are higher than your target goals. • You often have lower blood sugar levels than your target goals. • Your skin is red, dry, warm, or swollen. • You have trouble coping with diabetes, or you feel anxious or depressed. • You have questions or concerns about your condition or care.     What you need to know about high blood sugar levels:  High blood sugar levels may not cause any symptoms. You may feel more thirsty or urinate more often than usual. Over time, high blood sugar levels can damage your nerves, blood vessels, tissues, and organs. The following can increase your blood sugar levels:  • Large meals or large amounts of carbohydrates at one time    • Less physical activity    • Stress    • Illness    • A lower dose of diabetes medicine or insulin, or a late dose    What you need to know about low blood sugar levels:  Symptoms include feeling shaky, dizzy, irritable, or confused. You can prevent symptoms by keeping your blood sugar levels from going too low. • Treat a low blood sugar level right away:      ? Drink 4 ounces of juice or have 1 tube of glucose gel. ? Check your blood sugar level again 10 to 15 minutes later. ? When the level goes back to normal, eat a meal or snack to prevent another decrease. • Keep glucose gel, raisins, or hard candy with you at all times to treat a low blood sugar level. • Your blood sugar level can get too low if you take diabetes medicine or insulin and do not eat enough food. • If you use insulin, check your blood sugar level before you exercise. ? If your blood sugar level is below 100 mg/dL, eat 4 crackers or 2 ounces of raisins, or drink 4 ounces of juice. ? Check your level every 30 minutes if you exercise longer than 1 hour. ? You may need a snack during or after exercise. What you can do to manage your blood sugar levels:   • Check your blood sugar levels as directed and as needed. Several items are available to use to check your levels. You may need to check by testing a drop of blood in a glucose monitor. You may instead be given a continuous glucose monitoring (CGM) device. The device is worn at all times. The CGM checks your blood sugar level every 5 minutes. It sends results to an electronic device such as a smart phone. A CGM can be used with or without an insulin pump.  You and your diabetes care team providers will decide on the best method for you. The goal for blood sugar levels before meals  is between 80 and 130 mg/dL and 2 hours after eating  is lower than 180 mg/dL. • Make healthy food choices. Work with a dietitian to develop a meal plan that works for you and your schedule. A dietitian can help you learn how to eat the right amount of carbohydrates during your meals and snacks. Carbohydrates can raise your blood sugar level if you eat too many at one time. Examples of foods that contain carbohydrates are breads, cereals, rice, pasta, and sweets. • Eat high-fiber foods as directed. Fiber helps improve blood sugar levels. Fiber also lowers your risk for heart disease and other problems diabetes can cause. Examples of high-fiber foods include vegetables, whole-grain bread, and beans such as logan beans. Your dietitian can tell you how much fiber to have each day. • Get regular physical activity. Physical activity can help you get to your target blood sugar level goal and manage your weight. Get at least 150 minutes of moderate to vigorous aerobic physical activity each week. Do not miss more than 2 days in a row. Do not sit longer than 30 minutes at a time. Your healthcare provider can help you create an activity plan. The plan can include the best activities for you and can help you build your strength and endurance. • Maintain a healthy weight. Ask your team what a healthy weight is for you. A healthy weight can help you control diabetes and prevent heart disease. Ask your team to help you create a weight loss plan, if needed. Weight loss can help make a difference in managing diabetes. Your team will help you set a weight-loss goal, such as 10 to 15 pounds, or 5% of your extra weight. Together you and your team can set manageable weight loss goals. • Take your diabetes medicine or insulin as directed.   You may need diabetes medicine, insulin, or both to help control your blood sugar levels. Your healthcare provider will teach you how and when to take your diabetes medicine or insulin. You will also be taught about side effects oral diabetes medicine can cause. Insulin may be injected or given through a pump or pen. You and your providers will decide on the best method for you:    ? An insulin pump  is an implanted device that gives your insulin 24 hours a day. An insulin pump prevents the need for multiple insulin injections in a day. ? An insulin pen  is a device prefilled with the right amount of insulin. ? You and your family members will be taught how to draw up and give insulin  if this is the best method for you. Your providers will also teach you how to dispose of needles and syringes. ? You will learn how much insulin you need  and when to give it. You will be taught when not to give insulin. You will also be taught what to do if your blood sugar level drops too low. This may happen if you take insulin and do not eat the right amount of carbohydrates. More ways to manage type 2 diabetes:   • Wear medical alert identification. Wear medical alert jewelry or carry a card that says you have diabetes. Ask your provider where to get these items. • Do not smoke. Nicotine and other chemicals in cigarettes and cigars can cause lung and blood vessel damage. It also makes it more difficult to manage your diabetes. Ask your provider for information if you currently smoke and need help to quit. Do not use e-cigarettes or smokeless tobacco in place of cigarettes or to help you quit. They still contain nicotine. • Check your feet each day for cuts, scratches, calluses, or other wounds. Look for redness and swelling, and feel for warmth. Wear shoes that fit well. Check your shoes for rocks or other objects that can hurt your feet. Do not walk barefoot or wear shoes without socks.  Wear cotton socks to help keep your feet dry. • Ask about vaccines you may need. You have a higher risk for serious illness if you get the flu, pneumonia, COVID-19, or hepatitis. Ask your provider if you should get vaccines to prevent these or other diseases, and when to get the vaccines. • Talk to your provider if you become stressed about diabetes care. Sometimes being able to fit diabetes care into your life can cause increased stress. The stress can cause you not to take care of yourself properly. Your care team providers can help by offering tips about self-care. Your providers may suggest you talk to a mental health provider who can listen and offer help with self-care issues. • Have your A1c checked as directed. Your provider may check your A1c every 3 months, or 2 times each year if your diabetes is controlled. An A1c test shows the average amount of sugar in your blood over the past 2 to 3 months. Your provider will tell you what your A1c level should be. • Have screening tests as directed. Your provider may recommend screening for complications of diabetes and other conditions that may develop. Some screenings may begin right away and some may happen within the first 5 years of diagnosis:    ? Examples of diabetes complications  include kidney problems, high cholesterol, high blood pressure, blood vessel problems, eye problems, and sleep apnea. ? You may be screened for a low vitamin B level  if you take oral diabetes medicine for a long time. ? Women of childbearing years may be screened  for polycystic ovarian syndrome (PCOS). Follow up with your doctor or diabetes care team providers as directed: You may need to have blood tests done before your follow-up visit. The test results will show if changes need to be made in your treatment or self-care. Talk to your provider if you cannot afford your medicine. Write down your questions so you remember to ask them during your visits.   © Copyright [employer] 2022 Information is for End User's use only and may not be sold, redistributed or otherwise used for commercial purposes. The above information is an  only. It is not intended as medical advice for individual conditions or treatments. Talk to your doctor, nurse or pharmacist before following any medical regimen to see if it is safe and effective for you. Foot Care for People with Diabetes   AMBULATORY CARE:   What you need to know about foot care:  Long-term high blood sugar levels can damage the blood vessels and nerves in your legs and feet. This damage makes it hard to feel pressure, pain, temperature, and touch. You may not be able to feel a cut or sore, or shoes that are too tight. Foot care is needed to prevent serious problems, such as an infection or amputation. Diabetes may cause your toes to become crooked or curved under. These changes may affect the way you walk and can lead to increased pressure on your foot. The pressure can decrease blood flow to your feet. Lack of blood flow increases your risk for a foot ulcer. Call your care team provider if:   • Your feet become numb, weak, or hard to move. • You have pus draining from a sore on your foot. • You have a wound on your foot that gets bigger, deeper, or does not heal.    • You see blisters, cuts, scratches, calluses, or sores on your foot. • You have a fever, and your feet become red, warm, and swollen. • Your toenails become thick, curled, or yellow. • You find it hard to check your feet because your vision is poor. • You have questions or concerns about your condition or care. How to care for your feet:   • Check your feet each day. Look at your whole foot, including the bottom, and between and under your toes. Check for wounds, corns, and calluses. Use a mirror to see the bottom of your feet. The skin on your feet may be shiny, tight, or darker than normal. Your feet may also be cold and pale.  Feel your feet by running your hands along the tops, bottoms, sides, and between your toes. Redness, swelling, and warmth are signs of blood flow problems that can lead to a foot ulcer. Do not try to remove corns or calluses yourself. Do not ignore small problems, such as dry skin or small wounds. These can become life-threatening over time without proper care. • Wash your feet each day with soap and warm water. Do not use hot water, because this can injure your foot. Dry your feet gently with a towel after you wash them. Dry between and under your toes. • Apply lotion or a moisturizer on your dry feet. Ask your care team provider what lotions are best to use. Do not put lotion or moisturizer between your toes. Moisture between your toes could lead to skin breakdown. • Cut your toenails correctly. File or cut your toenails straight across. Use a soft brush to clean around your toenails. If your toenails are very thick, you may need to have a care team provider or specialist cut them. • Protect your feet. Do not walk barefoot or wear your shoes without socks. Check your shoes for rocks or other objects that can hurt your feet. Wear cotton socks to help keep your feet dry. Wear socks without toe seams, or wear them with the seams inside out. Change your socks each day. Do not wear socks that are dirty or damp. • Wear shoes that fit well. Wear shoes that do not rub against any area of your feet. Your shoes should be ½ to ¾ inch (1 to 2 centimeters) longer than your feet. Your shoes should also have extra space around the widest part of your feet. Walking or athletic shoes with laces or straps that adjust are best. Ask your care team provider for help to choose shoes that fit you best. Ask your provider if you need to wear an insert, orthotic, or bandage on your feet. • Go to your follow-up visits. Your care team provider will do a foot exam at least 1 time each year.  You may need a foot exam more often if you have nerve damage, foot deformities, or ulcers. Your provider will check for nerve damage and how well you can feel your feet. Your provider will check your shoes to see if they fit well. • Do not smoke. Smoking can damage your blood vessels and put you at increased risk for foot ulcers. Ask your care team provider for information if you currently smoke and need help to quit. E-cigarettes or smokeless tobacco still contain nicotine. Talk to your care team provider before you use these products. Follow up with your diabetes care team provider or foot specialist as directed: You will need to have your feet checked at least 1 time each year. You may need a foot exam more often if you have nerve damage, foot deformities, or ulcers. Write down your questions so you remember to ask them during your visits. © Copyright [facility] [place] 2022 Information is for End User's use only and may not be sold, redistributed or otherwise used for commercial purposes. The above information is an  only. It is not intended as medical advice for individual conditions or treatments. Talk to your doctor, nurse or pharmacist before following any medical regimen to see if it is safe and effective for you.

## 2023-10-04 DIAGNOSIS — O22.30 DVT (DEEP VEIN THROMBOSIS) IN PREGNANCY: ICD-10-CM

## 2023-11-07 DIAGNOSIS — K21.9 GASTROESOPHAGEAL REFLUX DISEASE WITHOUT ESOPHAGITIS: ICD-10-CM

## 2023-11-07 RX ORDER — OMEPRAZOLE 40 MG/1
40 CAPSULE, DELAYED RELEASE ORAL DAILY
Qty: 90 CAPSULE | Refills: 0 | Status: SHIPPED | OUTPATIENT
Start: 2023-11-07

## 2023-11-07 NOTE — TELEPHONE ENCOUNTER
Medication Refill Request     Name Omeprazole  Dose/Frequency 40 mg daily  Quantity 90  Verified pharmacy   [x]  Verified ordering Provider   [x]  Does patient have enough for the next 3 days?  Yes [x] No []

## 2023-11-28 ENCOUNTER — APPOINTMENT (OUTPATIENT)
Dept: LAB | Facility: HOSPITAL | Age: 53
End: 2023-11-28
Payer: MEDICARE

## 2023-11-28 ENCOUNTER — OFFICE VISIT (OUTPATIENT)
Age: 53
End: 2023-11-28
Payer: MEDICARE

## 2023-11-28 VITALS
BODY MASS INDEX: 38.98 KG/M2 | SYSTOLIC BLOOD PRESSURE: 118 MMHG | OXYGEN SATURATION: 99 % | WEIGHT: 220 LBS | DIASTOLIC BLOOD PRESSURE: 80 MMHG | HEIGHT: 63 IN | HEART RATE: 73 BPM | RESPIRATION RATE: 16 BRPM

## 2023-11-28 DIAGNOSIS — R19.7 DIARRHEA, UNSPECIFIED TYPE: Primary | ICD-10-CM

## 2023-11-28 DIAGNOSIS — R19.7 DIARRHEA, UNSPECIFIED TYPE: ICD-10-CM

## 2023-11-28 LAB
ALBUMIN SERPL BCP-MCNC: 3.9 G/DL (ref 3.5–5)
ALP SERPL-CCNC: 72 U/L (ref 34–104)
ALT SERPL W P-5'-P-CCNC: 19 U/L (ref 7–52)
ANION GAP SERPL CALCULATED.3IONS-SCNC: 6 MMOL/L
AST SERPL W P-5'-P-CCNC: 16 U/L (ref 13–39)
BASOPHILS # BLD AUTO: 0.05 THOUSANDS/ÂΜL (ref 0–0.1)
BASOPHILS NFR BLD AUTO: 1 % (ref 0–1)
BILIRUB SERPL-MCNC: 0.42 MG/DL (ref 0.2–1)
BUN SERPL-MCNC: 11 MG/DL (ref 5–25)
CALCIUM SERPL-MCNC: 9 MG/DL (ref 8.4–10.2)
CHLORIDE SERPL-SCNC: 106 MMOL/L (ref 96–108)
CO2 SERPL-SCNC: 25 MMOL/L (ref 21–32)
CREAT SERPL-MCNC: 1.01 MG/DL (ref 0.6–1.3)
CRP SERPL QL: 34.8 MG/L
EOSINOPHIL # BLD AUTO: 0.21 THOUSAND/ÂΜL (ref 0–0.61)
EOSINOPHIL NFR BLD AUTO: 3 % (ref 0–6)
ERYTHROCYTE [DISTWIDTH] IN BLOOD BY AUTOMATED COUNT: 15.9 % (ref 11.6–15.1)
ERYTHROCYTE [SEDIMENTATION RATE] IN BLOOD: 45 MM/HOUR (ref 0–29)
GFR SERPL CREATININE-BSD FRML MDRD: 62 ML/MIN/1.73SQ M
GLUCOSE SERPL-MCNC: 84 MG/DL (ref 65–140)
HCT VFR BLD AUTO: 37.5 % (ref 34.8–46.1)
HGB BLD-MCNC: 11.6 G/DL (ref 11.5–15.4)
IMM GRANULOCYTES # BLD AUTO: 0.01 THOUSAND/UL (ref 0–0.2)
IMM GRANULOCYTES NFR BLD AUTO: 0 % (ref 0–2)
LYMPHOCYTES # BLD AUTO: 2.47 THOUSANDS/ÂΜL (ref 0.6–4.47)
LYMPHOCYTES NFR BLD AUTO: 33 % (ref 14–44)
MCH RBC QN AUTO: 23.8 PG (ref 26.8–34.3)
MCHC RBC AUTO-ENTMCNC: 30.9 G/DL (ref 31.4–37.4)
MCV RBC AUTO: 77 FL (ref 82–98)
MONOCYTES # BLD AUTO: 0.9 THOUSAND/ÂΜL (ref 0.17–1.22)
MONOCYTES NFR BLD AUTO: 12 % (ref 4–12)
NEUTROPHILS # BLD AUTO: 3.78 THOUSANDS/ÂΜL (ref 1.85–7.62)
NEUTS SEG NFR BLD AUTO: 51 % (ref 43–75)
NRBC BLD AUTO-RTO: 0 /100 WBCS
PLATELET # BLD AUTO: 270 THOUSANDS/UL (ref 149–390)
PMV BLD AUTO: 11.1 FL (ref 8.9–12.7)
POTASSIUM SERPL-SCNC: 3.6 MMOL/L (ref 3.5–5.3)
PROT SERPL-MCNC: 7.6 G/DL (ref 6.4–8.4)
RBC # BLD AUTO: 4.88 MILLION/UL (ref 3.81–5.12)
SODIUM SERPL-SCNC: 137 MMOL/L (ref 135–147)
WBC # BLD AUTO: 7.42 THOUSAND/UL (ref 4.31–10.16)

## 2023-11-28 PROCEDURE — 85652 RBC SED RATE AUTOMATED: CPT

## 2023-11-28 PROCEDURE — 80053 COMPREHEN METABOLIC PANEL: CPT

## 2023-11-28 PROCEDURE — 86140 C-REACTIVE PROTEIN: CPT

## 2023-11-28 PROCEDURE — 36415 COLL VENOUS BLD VENIPUNCTURE: CPT

## 2023-11-28 PROCEDURE — 99213 OFFICE O/P EST LOW 20 MIN: CPT

## 2023-11-28 PROCEDURE — 85025 COMPLETE CBC W/AUTO DIFF WBC: CPT

## 2023-11-28 RX ORDER — OXYCODONE HYDROCHLORIDE AND ACETAMINOPHEN 5; 325 MG/1; MG/1
TABLET ORAL
COMMUNITY
Start: 2023-10-19

## 2023-11-28 RX ORDER — CLINDAMYCIN HYDROCHLORIDE 300 MG/1
300 CAPSULE ORAL 4 TIMES DAILY
COMMUNITY
Start: 2023-10-19

## 2023-11-28 RX ORDER — PREDNISONE 5 MG/1
5 TABLET ORAL DAILY
COMMUNITY
Start: 2023-11-13

## 2023-11-28 NOTE — PROGRESS NOTES
INTERNAL MEDICINE FOLLOW-UP VISIT  [facility] - [facility] INTERNAL MEDICINE [address]    NAME: Sindhu Rainey  AGE: 52 y.o. SEX: female  : 1970     DATE: 2023     Assessment and Plan:   1. Diarrhea, unspecified type  5 days of liquid stool. No fevers, no abdominal pain, no melena, no vomiting. Occasional nausea. Continue on "brat" diet. If any blood, fever, dizziness, syncope, abdominal pain, contact the office or seek medical attention  We will complete a stool collection for bacteria, C. difficile as well as labs to rule out infection as well as dehydration.  - CBC and differential; Future  - Comprehensive metabolic panel; Future  - Sedimentation rate, automated; Future  - C-reactive protein; Future  - Clostridium difficile toxin by PCR with EIA; Future  - Fecal leukocytes; Future  - Ova and parasite examination; Future  - Stool Enteric Bacterial Panel by PCR; Future  - Calprotectin,Fecal; Future  - Stool culture; Future        No follow-ups on file. Chief Complaint:     Chief Complaint   Patient presents with   • Diarrhea     Since Thursday, patient denies any fever      History of Present Illness:     Rogelio Carias is a 47-year-old female present today in the office for complaints of diarrhea since . She states she has had liquid diarrhea, no abdominal pain, no blood in her stool. She denies any vomiting but does admit to intermittent nausea. She denies any fever. Been trying to follow a brat diet however continues with the diarrhea. 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 She denies any recent travel, any recent new foods, no sushi or raw foods, no new medications.     The following portions of the patient's history were reviewed and updated as appropriate: allergies, current medications, past family history, past medical history, past social history, past surgical history and problem list.     Review of Systems: Review of Systems   Constitutional:  Negative for appetite change, chills, diaphoresis, fatigue, fever and unexpected weight change. HENT:  Negative for postnasal drip and sneezing. Eyes:  Negative for visual disturbance. Respiratory:  Negative for chest tightness and shortness of breath. Cardiovascular:  Negative for chest pain, palpitations and leg swelling. Gastrointestinal:  Positive for diarrhea. Negative for abdominal pain and blood in stool. Endocrine: Negative for cold intolerance, heat intolerance, polydipsia, polyphagia and polyuria. Genitourinary:  Negative for difficulty urinating, dysuria, frequency and urgency. Musculoskeletal:  Negative for arthralgias and myalgias. Skin:  Negative for rash and wound. Neurological:  Negative for dizziness, weakness, light-headedness and headaches. Hematological:  Negative for adenopathy. Psychiatric/Behavioral:  Negative for confusion, dysphoric mood and sleep disturbance. The patient is not nervous/anxious.          Past Medical History:     Past Medical History:   Diagnosis Date   • Anxiety    • Arthritis    • Asthma     when sick   • Colon polyp    • CRPS (complex regional pain syndrome)    • Depression    • DVT (deep venous thrombosis) ([facility])    • GERD (gastroesophageal reflux disease)    • H/O total hysterectomy    • History of transfusion     2017 - no adverse reaction   • Hyperlipidemia    • Inflammatory spondylopathies ([facility])    • Nonmelanoma skin cancer     LAST ASSESSED: [id number]   • PE (pulmonary thromboembolism) ([facility])    • PONV (postoperative nausea and vomiting)    • Prediabetes    • Squamous cell carcinoma    • Squamous cell skin cancer 2020    nose    • Urinary incontinence    • Uterine leiomyoma     LAST ASSESSED: [id number]   • Wears reading eyeglasses         Current Medications:     Current Outpatient Medications:   •  albuterol (PROVENTIL HFA,VENTOLIN HFA) 90 mcg/act inhaler, Inhale 2 puffs every 6 (six) hours as needed for wheezing, Disp: , Rfl:   •  b complex vitamins capsule, Take 1 capsule by mouth daily, Disp: , Rfl:   •  buPROPion (WELLBUTRIN XL) 300 mg 24 hr tablet, Take 1 tablet (300 mg total) by mouth every morning, Disp: 90 tablet, Rfl: 3  •  calcium carbonate (OS-APRYL) 600 MG tablet, Take 600 mg by mouth 2 (two) times a day with meals, Disp: , Rfl:   •  cevimeline (EVOXAC) 30 MG capsule, Take 30 mg by mouth 3 (three) times a day, Disp: , Rfl:   •  Cholecalciferol (VITAMIN D3) 5000 units CAPS, Take by mouth, Disp: , Rfl:   •  clindamycin (CLEOCIN) 300 MG capsule, Take 300 mg by mouth 4 (four) times a day, Disp: , Rfl:   •  cycloSPORINE (RESTASIS) 0.05 % ophthalmic emulsion, , Disp: , Rfl:   •  desonide (DESOWEN) 0.05 % cream, Apply topically 2 (two) times a day (Patient taking differently: Apply topically as needed), Disp: 60 g, Rfl: 3  •  ferrous sulfate 324 (65 Fe) mg, Take 1 tablet (324 mg total) by mouth every other day, Disp: , Rfl:   •  folic acid (FOLVITE) 1 mg tablet, Take 3 tablets by mouth daily  , Disp: , Rfl:   •  glucosamine-chondroitin 500-400 MG tablet, Take 1 tablet by mouth in the morning, Disp: , Rfl:   •  HYDROcodone-acetaminophen (NORCO) 5-325 mg per tablet, Take 1 tablet by mouth every 8 (eight) hours as needed, Disp: , Rfl:   •  inFLIXimab (REMICADE IV), Infuse into a venous catheter Every 6 weeks. , Disp: , Rfl:   •  LORazepam (ATIVAN) 0.5 mg tablet, Take 1 tablet (0.5 mg total) by mouth daily at bedtime, Disp: 30 tablet, Rfl: 0  •  methotrexate 2.5 mg tablet, Take 2.5 mg by mouth once a week Injection once a week 1cc, Disp: , Rfl:   •  mometasone (NASONEX) 50 mcg/act nasal spray, SPRAY 2 SPRAYS INTO EACH NOSTRIL EVERY DAY, Disp: 17 g, Rfl: 2  •  mupirocin (BACTROBAN) 2 % ointment, ADMINISTER INTO NOSTRIL 2 TIMES A DAY., Disp: , Rfl:   •  omeprazole (PriLOSEC) 40 MG capsule, Take 1 capsule (40 mg total) by mouth daily, Disp: 90 capsule, Rfl: 0  •  oxyCODONE-acetaminophen (PERCOCET) 5-325 mg per tablet, TAKE 1 TABLET BY MOUTH EVERY 4 HOURS TO EVERY 6 HOURS AS NEEDED FOR PAIN, Disp: , Rfl:   •  predniSONE 5 mg tablet, Take 5 mg by mouth daily, Disp: , Rfl:   •  Probiotic Product (PROBIOTIC-10) CAPS, Take by mouth, Disp: , Rfl:   •  rivaroxaban (Xarelto) 20 mg tablet, Take 1 tablet (20 mg total) by mouth daily, Disp: 90 tablet, Rfl: 3  •  rosuvastatin (CRESTOR) 10 MG tablet, Take 1 tablet (10 mg total) by mouth daily, Disp: 90 tablet, Rfl: 1  •  sertraline (ZOLOFT) 100 mg tablet, TAKE 1 TABLET BY MOUTH EVERY DAY, Disp: 90 tablet, Rfl: 3  •  VITAMIN B COMPLEX-C PO, vitamin B complex  Take 1 tablet by mouth once daily. , Disp: , Rfl:      Allergies: Allergies   Allergen Reactions   • Leflunomide Shortness Of Breath and Palpitations   • Penicillins Hives   • Ciprofloxacin Rash   • Codeine Itching and Rash   • Morphine Itching and Rash        Physical Exam:     /80 (BP Location: Left arm, Patient Position: Sitting, Cuff Size: Large)   Pulse 73   Resp 16   Ht 5' 3" (1.6 m)   Wt 99.8 kg (220 lb)   SpO2 99%   BMI 38.97 kg/m²     Physical Exam  Constitutional:       Appearance: She is well-developed. HENT:      Head: Normocephalic and atraumatic. Eyes:      Pupils: Pupils are equal, round, and reactive to light. Neck:      Thyroid: No thyromegaly. Cardiovascular:      Rate and Rhythm: Normal rate and regular rhythm. Heart sounds: No murmur heard. Pulmonary:      Effort: Pulmonary effort is normal.      Breath sounds: Normal breath sounds. Abdominal:      General: Bowel sounds are normal.      Palpations: Abdomen is soft. Musculoskeletal:         General: Normal range of motion. Cervical back: Normal range of motion and neck supple. Lymphadenopathy:      Cervical: No cervical adenopathy. Skin:     General: Skin is warm and dry. Neurological:      Mental Status: She is alert and oriented to person, place, and time.            Data:     Laboratory Results: I have personally reviewed the pertinent laboratory results/reports   Radiology/Other Diagnostic Testing Results: I have personally reviewed pertinent reports.       [place], [address] [facility] INTERNAL MEDICINE [address]

## 2023-11-29 ENCOUNTER — APPOINTMENT (OUTPATIENT)
Dept: LAB | Facility: HOSPITAL | Age: 53
End: 2023-11-29
Payer: MEDICARE

## 2023-11-29 DIAGNOSIS — R19.7 DIARRHEA, UNSPECIFIED TYPE: ICD-10-CM

## 2023-11-29 PROCEDURE — 87505 NFCT AGENT DETECTION GI: CPT

## 2023-11-29 PROCEDURE — 83993 ASSAY FOR CALPROTECTIN FECAL: CPT

## 2023-11-29 PROCEDURE — 89055 LEUKOCYTE ASSESSMENT FECAL: CPT

## 2023-11-29 PROCEDURE — 87177 OVA AND PARASITES SMEARS: CPT

## 2023-11-29 PROCEDURE — 87209 SMEAR COMPLEX STAIN: CPT

## 2023-11-30 ENCOUNTER — TELEPHONE (OUTPATIENT)
Age: 53
End: 2023-11-30

## 2023-11-30 DIAGNOSIS — R19.7 DIARRHEA, UNSPECIFIED TYPE: Primary | ICD-10-CM

## 2023-11-30 LAB
C DIFF TOX GENS STL QL NAA+PROBE: NEGATIVE
CAMPYLOBACTER DNA SPEC NAA+PROBE: DETECTED
SALMONELLA DNA SPEC QL NAA+PROBE: ABNORMAL
SHIGA TOXIN STX GENE SPEC NAA+PROBE: ABNORMAL
SHIGELLA DNA SPEC QL NAA+PROBE: ABNORMAL

## 2023-11-30 RX ORDER — AZITHROMYCIN 250 MG/1
TABLET, FILM COATED ORAL
Qty: 6 TABLET | Refills: 0 | Status: SHIPPED | OUTPATIENT
Start: 2023-11-30 | End: 2023-12-04

## 2023-11-30 NOTE — TELEPHONE ENCOUNTER
----- Message from [address] sent at 11/30/2023  1:10 PM EST -----  Please ask how she is feeling? So far she is positive for campylobacter but still awaiting a few more studies to come back. This bacteria is self limiting and usually needs to treatment will go away on its own.

## 2023-11-30 NOTE — TELEPHONE ENCOUNTER
Patient stated that she cannot have Cypro as a treatment for the Campylobacter sp that was detected in her lab work but she would like to have something sent to the pharmacy as soon as possible because she stated that she feels really horrible, please advise

## 2023-12-03 LAB — CALPROTECTIN STL-MCNT: 39 UG/G (ref 0–120)

## 2023-12-06 LAB — WBC SPEC QL GRAM STN: NORMAL

## 2023-12-07 ENCOUNTER — TELEPHONE (OUTPATIENT)
Age: 53
End: 2023-12-07

## 2024-01-03 DIAGNOSIS — E78.49 OTHER HYPERLIPIDEMIA: ICD-10-CM

## 2024-01-03 RX ORDER — ROSUVASTATIN CALCIUM 10 MG/1
10 TABLET, COATED ORAL DAILY
Qty: 90 TABLET | Refills: 1 | Status: SHIPPED | OUTPATIENT
Start: 2024-01-03

## 2024-01-05 DIAGNOSIS — K21.9 GASTROESOPHAGEAL REFLUX DISEASE WITHOUT ESOPHAGITIS: ICD-10-CM

## 2024-01-05 RX ORDER — OMEPRAZOLE 40 MG/1
40 CAPSULE, DELAYED RELEASE ORAL DAILY
Qty: 90 CAPSULE | Refills: 0 | Status: SHIPPED | OUTPATIENT
Start: 2024-01-05

## 2024-02-21 PROBLEM — Z00.00 HEALTH MAINTENANCE EXAMINATION: Status: RESOLVED | Noted: 2019-04-12 | Resolved: 2024-02-21

## 2024-04-29 DIAGNOSIS — K21.9 GASTROESOPHAGEAL REFLUX DISEASE WITHOUT ESOPHAGITIS: ICD-10-CM

## 2024-04-29 RX ORDER — OMEPRAZOLE 40 MG/1
40 CAPSULE, DELAYED RELEASE ORAL DAILY
Qty: 90 CAPSULE | Refills: 0 | Status: SHIPPED | OUTPATIENT
Start: 2024-04-29

## 2024-06-28 DIAGNOSIS — E78.49 OTHER HYPERLIPIDEMIA: ICD-10-CM

## 2024-06-28 RX ORDER — ROSUVASTATIN CALCIUM 10 MG/1
10 TABLET, COATED ORAL DAILY
Qty: 90 TABLET | Refills: 0 | Status: SHIPPED | OUTPATIENT
Start: 2024-06-28

## 2024-07-12 DIAGNOSIS — F32.A DEPRESSION, UNSPECIFIED DEPRESSION TYPE: ICD-10-CM

## 2024-07-12 RX ORDER — SERTRALINE HYDROCHLORIDE 100 MG/1
TABLET, FILM COATED ORAL
Qty: 100 TABLET | Refills: 1 | Status: SHIPPED | OUTPATIENT
Start: 2024-07-12

## 2024-07-12 RX ORDER — BUPROPION HYDROCHLORIDE 300 MG/1
300 TABLET ORAL EVERY MORNING
Qty: 100 TABLET | Refills: 1 | Status: SHIPPED | OUTPATIENT
Start: 2024-07-12

## 2024-08-07 DIAGNOSIS — K21.9 GASTROESOPHAGEAL REFLUX DISEASE WITHOUT ESOPHAGITIS: ICD-10-CM

## 2024-08-07 RX ORDER — OMEPRAZOLE 40 MG/1
40 CAPSULE, DELAYED RELEASE ORAL DAILY
Qty: 90 CAPSULE | Refills: 1 | Status: SHIPPED | OUTPATIENT
Start: 2024-08-07

## 2024-08-16 DIAGNOSIS — O22.30 DVT (DEEP VEIN THROMBOSIS) IN PREGNANCY: ICD-10-CM

## 2024-08-16 RX ORDER — RIVAROXABAN 20 MG/1
20 TABLET, FILM COATED ORAL DAILY
Qty: 30 TABLET | Refills: 0 | Status: SHIPPED | OUTPATIENT
Start: 2024-08-16

## 2024-09-18 ENCOUNTER — TELEPHONE (OUTPATIENT)
Dept: GASTROENTEROLOGY | Facility: CLINIC | Age: 54
End: 2024-09-18

## 2024-09-18 NOTE — TELEPHONE ENCOUNTER
Spoke with the patient and he  is just getting ready to begin chemotherapy and life will be crazy. She will call us when she is ready but will probably not be until after his treatments.

## 2024-09-22 DIAGNOSIS — E78.49 OTHER HYPERLIPIDEMIA: ICD-10-CM

## 2024-09-23 RX ORDER — ROSUVASTATIN CALCIUM 10 MG/1
10 TABLET, COATED ORAL DAILY
Qty: 90 TABLET | Refills: 3 | Status: SHIPPED | OUTPATIENT
Start: 2024-09-23

## 2024-10-24 DIAGNOSIS — O22.30 DVT (DEEP VEIN THROMBOSIS) IN PREGNANCY: ICD-10-CM

## 2024-10-24 RX ORDER — RIVAROXABAN 20 MG/1
20 TABLET, FILM COATED ORAL DAILY
Qty: 30 TABLET | Refills: 5 | Status: SHIPPED | OUTPATIENT
Start: 2024-10-24

## 2024-10-25 DIAGNOSIS — F32.A DEPRESSION, UNSPECIFIED DEPRESSION TYPE: ICD-10-CM

## 2024-10-28 RX ORDER — SERTRALINE HYDROCHLORIDE 100 MG/1
TABLET, FILM COATED ORAL
Qty: 90 TABLET | Refills: 0 | Status: SHIPPED | OUTPATIENT
Start: 2024-10-28

## 2024-10-28 RX ORDER — BUPROPION HYDROCHLORIDE 300 MG/1
300 TABLET ORAL EVERY MORNING
Qty: 90 TABLET | Refills: 0 | Status: SHIPPED | OUTPATIENT
Start: 2024-10-28

## 2024-12-07 DIAGNOSIS — K21.9 GASTROESOPHAGEAL REFLUX DISEASE WITHOUT ESOPHAGITIS: ICD-10-CM

## 2024-12-09 RX ORDER — OMEPRAZOLE 40 MG/1
40 CAPSULE, DELAYED RELEASE ORAL DAILY
Qty: 90 CAPSULE | Refills: 1 | Status: SHIPPED | OUTPATIENT
Start: 2024-12-09

## 2025-01-08 PROBLEM — R19.8 OTHER SPECIFIED SYMPTOMS AND SIGNS INVOLVING THE DIGESTIVE SYSTEM AND ABDOMEN: Status: RESOLVED | Noted: 2025-01-08 | Resolved: 2025-01-08

## 2025-01-08 PROBLEM — M19.90 ARTHRITIS: Status: RESOLVED | Noted: 2023-01-25 | Resolved: 2025-01-08

## 2025-01-08 PROBLEM — M35.00 SICCA (HCC): Status: ACTIVE | Noted: 2020-10-02

## 2025-01-08 PROBLEM — M45.9 ANKYLOSING SPONDYLITIS (HCC): Status: ACTIVE | Noted: 2020-10-02

## 2025-01-08 PROBLEM — R10.31 RIGHT LOWER QUADRANT ABDOMINAL PAIN: Status: RESOLVED | Noted: 2025-01-08 | Resolved: 2025-01-08

## 2025-01-08 PROBLEM — Z01.818 PRE-OPERATIVE EXAMINATION: Status: RESOLVED | Noted: 2025-01-08 | Resolved: 2025-01-08

## 2025-01-08 PROBLEM — D22.9 MULTIPLE BENIGN MELANOCYTIC NEVI: Status: RESOLVED | Noted: 2025-01-08 | Resolved: 2025-01-08

## 2025-01-08 PROBLEM — D50.9 IRON DEFICIENCY ANEMIA: Status: RESOLVED | Noted: 2018-12-11 | Resolved: 2025-01-08

## 2025-01-08 PROBLEM — F33.9 EPISODE OF RECURRENT MAJOR DEPRESSIVE DISORDER, UNSPECIFIED DEPRESSION EPISODE SEVERITY (HCC): Status: RESOLVED | Noted: 2021-07-06 | Resolved: 2025-01-08

## 2025-01-08 PROBLEM — R53.82 CHRONIC FATIGUE: Status: RESOLVED | Noted: 2023-01-04 | Resolved: 2025-01-08

## 2025-01-08 PROBLEM — L30.9 ECZEMA: Status: RESOLVED | Noted: 2025-01-08 | Resolved: 2025-01-08

## 2025-01-08 PROBLEM — Z79.01 ANTICOAGULANT LONG-TERM USE: Status: RESOLVED | Noted: 2025-01-08 | Resolved: 2025-01-08

## 2025-01-08 PROBLEM — R71.8 MICROCYTIC RED BLOOD CELLS: Status: RESOLVED | Noted: 2025-01-08 | Resolved: 2025-01-08

## 2025-01-08 PROBLEM — M25.50 ARTHRALGIA: Status: RESOLVED | Noted: 2020-10-02 | Resolved: 2025-01-08

## 2025-01-08 PROBLEM — R07.9 CHEST PAIN: Status: RESOLVED | Noted: 2022-12-26 | Resolved: 2025-01-08

## 2025-01-08 PROBLEM — Z48.89 POSTOPERATIVE VISIT: Status: RESOLVED | Noted: 2023-02-16 | Resolved: 2025-01-08

## 2025-01-08 PROBLEM — S88.919A AMPUTATION OF LEG (HCC): Status: ACTIVE | Noted: 2025-01-08

## 2025-01-08 PROBLEM — R10.9 ABDOMINAL PAIN: Status: RESOLVED | Noted: 2022-12-26 | Resolved: 2025-01-08

## 2025-01-08 PROBLEM — M54.50 LOW BACK PAIN: Status: RESOLVED | Noted: 2020-10-02 | Resolved: 2025-01-08

## 2025-01-08 NOTE — PATIENT INSTRUCTIONS
"Patient Education     Low blood sugar in people with diabetes   The Basics   Written by the doctors and editors at [facility]   What is low blood sugar? -- This is when the level of sugar in a person's blood gets too low. It is also called \"hypoglycemia.\"  Low blood sugar can cause symptoms ranging from sweating and feeling hungry to passing out.  Low blood sugar can happen in people with diabetes who take certain medicines, including insulin, other medicines given as shots, and some types of pills.  When can people with diabetes get low blood sugar? -- People with diabetes can get low blood sugar when they:   Take too much medicine, including insulin, other medicines given as shots, or certain diabetes pills   Do not eat enough food   Exercise too much without eating a snack or reducing their insulin dose   Wait too long between meals   Drink too much alcohol or drink alcohol on an empty stomach  What are the symptoms of low blood sugar? -- The symptoms can be different from person to person, and can change over time. During the early stages of low blood sugar, a person can:   Sweat or tremble   Feel hungry   Feel worried  People who have early symptoms should check their blood sugar level to see if it is low and needs to be treated. If low blood sugar levels are not treated, severe symptoms can occur. These can include:   Trouble walking or feeling weak   Trouble seeing clearly   Being confused or acting in a strange way   Passing out or having a seizure  Some people do not get symptoms during the early stages of low blood sugar. Doctors sometimes call this \"hypoglycemia unawareness.\" People with hypoglycemia unawareness are more likely to have severe symptoms, because they might not know that they have low blood sugar until they have severe symptoms. Hypoglycemia unawareness is more likely in people who:   Have had type 1 diabetes for more than 5 to 10 years   Have frequent episodes of low blood sugar   Use insulin " to keep their blood sugar level tightly managed   Are tired   Drink a lot of alcohol   Take certain medicines for high blood pressure or diabetes  How is low blood sugar treated? -- It can be treated with:   Quick sources of sugar - People can eat or drink quick sources of sugar (table 1). Foods that have fat, such as chocolate or cheese, do not treat low blood sugar as quickly. You and a family member should carry a quick source of sugar at all times.   A dose of glucagon - Glucagon is a hormone that can quickly raise blood sugar levels and stop severe symptoms. It comes as a shot (figure 1) or a nose spray. If your doctor recommends that you carry glucagon with you, they will tell you when and how to use it. If possible, it's also a good idea to have a family member, friend, or roommate learn how to give you glucagon. That way, they can give it to you if you can't do it yourself.  After treating low blood sugar, it is very important to recheck your blood sugar level to make sure that it rises and stays in the normal range. Once your blood sugar is normal, eat a small snack that contains protein, fat, and carbohydrate. This can help keep your blood sugar stable.  What should I do after treatment? -- After treatment for low blood sugar, most people can get back to their usual routine. But your doctor or nurse might recommend that you check your blood sugar level more often during the next 2 to 3 days.  If your low blood sugar was treated with glucagon, call your doctor or nurse. They might change the dose of your diabetes medicine.  How can I prevent low blood sugar? -- The best way is to:   Check your blood sugar levels often - Your doctor or nurse will tell you how and when to check your blood sugar levels at home. They will also tell you what your blood sugar levels should be, and when to treat low blood sugar.   Learn the symptoms of low blood sugar, and be ready to treat it in the early stages. Treating low  "blood sugar early can prevent severe symptoms.  When should I go to a hospital or call for an ambulance? -- A family member or friend should take you to a hospital or call for an ambulance (in the [place] and [place], call 9-1-1) if you:   Are still confused 15 minutes after being treated with a dose of glucagon   Have passed out, and there is no glucagon nearby   Still have low blood sugar after treatment  If you have low blood sugar, do not try to drive yourself to the hospital. Driving with low blood sugar can be dangerous.  All topics are updated as new evidence becomes available and our peer review process is complete.  This topic retrieved from [employer] on: Apr 11, 2024.  Topic 01581 Version 22.0  Release: 32.3.2 - C32.100  © 2024 UpToDate, Inc. and/or its affiliates. All rights reserved.  table 1: Quick sources of sugar to treat low blood sugar  3 or 4 glucose tablets   ½ cup of juice or regular soda (not sugar-free)   2 tablespoons of raisins   4 or 5 saltine crackers   1 tablespoon of sugar   1 tablespoon of honey or corn syrup   6 to 8 hard candies   These sources of sugar act quickly to treat low blood sugar levels. People with diabetes who use insulin or certain other diabetes medicines should carry at least 1 of these items at all times.  Graphic 50438 Version 4.0  figure 1: Glucagon autoinjector     Some people carry glucagon in the form of an autoinjector \"pen.\" This makes it easy to give a dose into the upper arm, thigh, or belly.  Graphic 804121 Version 2.0  Consumer Information Use and Disclaimer   Disclaimer: This generalized information is a limited summary of diagnosis, treatment, and/or medication information. It is not meant to be comprehensive and should be used as a tool to help the user understand and/or assess potential diagnostic and treatment options. It does NOT include all information about conditions, treatments, medications, side effects, or risks that may apply to a specific patient. It " is not intended to be medical advice or a substitute for the medical advice, diagnosis, or treatment of a health care provider based on the health care provider's examination and assessment of a patient's specific and unique circumstances. Patients must speak with a health care provider for complete information about their health, medical questions, and treatment options, including any risks or benefits regarding use of medications. This information does not endorse any treatments or medications as safe, effective, or approved for treating a specific patient. UpToDate, Inc. and its affiliates disclaim any warranty or liability relating to this information or the use thereof.The use of this information is governed by the Terms of Use, available at https://www.[employer].com/en/know/clinical-effectiveness-terms. 2024© UpToDate, Inc. and its affiliates and/or licensors. All rights reserved.  Copyright   © 2024 UpToDate, Inc. and/or its affiliates. All rights reserved.

## 2025-01-09 ENCOUNTER — OFFICE VISIT (OUTPATIENT)
Age: 55
End: 2025-01-09
Payer: MEDICARE

## 2025-01-09 VITALS
SYSTOLIC BLOOD PRESSURE: 138 MMHG | OXYGEN SATURATION: 100 % | HEIGHT: 63 IN | DIASTOLIC BLOOD PRESSURE: 84 MMHG | WEIGHT: 213 LBS | HEART RATE: 88 BPM | BODY MASS INDEX: 37.74 KG/M2

## 2025-01-09 DIAGNOSIS — E66.01 OBESITY, MORBID (HCC): ICD-10-CM

## 2025-01-09 DIAGNOSIS — Z12.11 SCREENING FOR COLORECTAL CANCER: ICD-10-CM

## 2025-01-09 DIAGNOSIS — Z12.31 ENCOUNTER FOR SCREENING MAMMOGRAM FOR BREAST CANCER: ICD-10-CM

## 2025-01-09 DIAGNOSIS — Z00.00 MEDICARE ANNUAL WELLNESS VISIT, SUBSEQUENT: Primary | ICD-10-CM

## 2025-01-09 DIAGNOSIS — M45.9 ANKYLOSING SPONDYLITIS, UNSPECIFIED SITE OF SPINE (HCC): ICD-10-CM

## 2025-01-09 DIAGNOSIS — K51.40 PSEUDOPOLYPOSIS OF COLON WITHOUT COMPLICATION, UNSPECIFIED PART OF COLON (HCC): ICD-10-CM

## 2025-01-09 DIAGNOSIS — D50.0 IRON DEFICIENCY ANEMIA DUE TO CHRONIC BLOOD LOSS: ICD-10-CM

## 2025-01-09 DIAGNOSIS — M35.00 SICCA, UNSPECIFIED TYPE (HCC): ICD-10-CM

## 2025-01-09 DIAGNOSIS — Z13.29 SCREENING FOR THYROID DISORDER: ICD-10-CM

## 2025-01-09 DIAGNOSIS — D89.89 OTHER SPECIFIED DISORDERS INVOLVING THE IMMUNE MECHANISM, NOT ELSEWHERE CLASSIFIED (HCC): ICD-10-CM

## 2025-01-09 DIAGNOSIS — E11.628 TYPE 2 DIABETES MELLITUS WITH OTHER SKIN COMPLICATIONS (HCC): ICD-10-CM

## 2025-01-09 DIAGNOSIS — G54.6 PHANTOM LIMB SYNDROME WITH PAIN (HCC): ICD-10-CM

## 2025-01-09 DIAGNOSIS — F41.9 ANXIETY: ICD-10-CM

## 2025-01-09 DIAGNOSIS — Z78.0 POSTMENOPAUSAL: ICD-10-CM

## 2025-01-09 DIAGNOSIS — E78.2 MIXED HYPERLIPIDEMIA: ICD-10-CM

## 2025-01-09 DIAGNOSIS — E11.9 TYPE 2 DIABETES MELLITUS WITHOUT COMPLICATION, WITHOUT LONG-TERM CURRENT USE OF INSULIN (HCC): ICD-10-CM

## 2025-01-09 DIAGNOSIS — I20.89 EXERTIONAL ANGINA (HCC): ICD-10-CM

## 2025-01-09 DIAGNOSIS — F11.20 CONTINUOUS OPIOID DEPENDENCE (HCC): ICD-10-CM

## 2025-01-09 DIAGNOSIS — D68.59 HYPERCOAGULABLE STATE (HCC): ICD-10-CM

## 2025-01-09 DIAGNOSIS — I26.99 OTHER PULMONARY EMBOLISM WITHOUT ACUTE COR PULMONALE, UNSPECIFIED CHRONICITY (HCC): ICD-10-CM

## 2025-01-09 DIAGNOSIS — F33.9 EPISODE OF RECURRENT MAJOR DEPRESSIVE DISORDER, UNSPECIFIED DEPRESSION EPISODE SEVERITY (HCC): ICD-10-CM

## 2025-01-09 DIAGNOSIS — Z12.12 SCREENING FOR COLORECTAL CANCER: ICD-10-CM

## 2025-01-09 PROCEDURE — 99214 OFFICE O/P EST MOD 30 MIN: CPT

## 2025-01-09 PROCEDURE — G0439 PPPS, SUBSEQ VISIT: HCPCS

## 2025-01-09 RX ORDER — LORAZEPAM 0.5 MG/1
0.5 TABLET ORAL
Qty: 30 TABLET | Refills: 0 | Status: SHIPPED | OUTPATIENT
Start: 2025-01-09

## 2025-01-09 NOTE — PROGRESS NOTES
Diabetic Foot Exam    Patient's shoes and socks removed.    Right Foot/Ankle   Right Foot Inspection  Skin Exam: skin normal and skin intact. No dry skin, no warmth, no callus, no erythema, no maceration, no abnormal color, no pre-ulcer, no ulcer and no callus.     Toe Exam: ROM and strength within normal limits.     Left Foot/Ankle  Left Foot Inspection  Skin Exam: skin normal and skin intact. No dry skin, no warmth, no erythema, no maceration, normal color, no pre-ulcer, no ulcer and no callus.     Toe Exam: ROM and strength within normal limits.     Assign Risk Category  No deformity present  No loss of protective sensation  No weak pulses  Risk: 2{Diabetic Foot Exam Documentation Incomplete}

## 2025-01-09 NOTE — PROGRESS NOTES
Name: Rimma Garcia      : 1970      MRN: [id number]  Encounter Provider: Tari Tovar PA-C  Encounter Date: 2025   Encounter department: [facility] [address]    Assessment & Plan  Medicare annual wellness visit, subsequent  Recommended eating a well-balanced diet of fruits, veggies, and lean meats and increasing water intake. 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 She sleeps okay. She denies any alcohol, tobacco, or illicit drug use. She is UTD with dentist and eye doctor. She is  to her , Jose. She has 3 kids and 3 grandkids.        Other pulmonary embolism without acute cor pulmonale, unspecified chronicity (HCC)  She has had 2 DVT's. The first was provoked after her hysterectomy around . She had a second DVT shortly after. She is currently on xarelto 20 mg daily lifelong.        Type 2 diabetes mellitus without complication, without long-term current use of insulin (HCC)  Due for A1c. No home sugars. She is not currently on medication. Reviewed a low sugar and carb diet.   Lab Results   Component Value Date    HGBA1C 6.0 (H) 2023       Orders:    Hemoglobin A1C    Comprehensive metabolic panel    Albumin / creatinine urine ratio    Continuous opioid dependence (HCC)  Not currently on any opioids at this time.        Episode of recurrent major depressive disorder, unspecified depression episode severity (HCC)  Depression Screening Follow-up Plan: Patient's depression screening was positive with a PHQ-9 score of 16. Patient with underlying depression and was advised to continue current medications as prescribed.She has had an increase in anxiety and depression due to her  being chronically ill. She is on wellbutrin 300 mg daily. Recommended adding ativan 0.5 mg prn for anxiety. Reviewed potential adverse effects.          Iron deficiency anemia due to chronic blood loss  Due for CBC and iron panel.   Orders:    CBC and  differential    Iron Panel (Includes Ferritin, Iron Sat%, Iron, and TIBC); Future    Mixed hyperlipidemia  Due for lipid panel. Currently on rosuvastatin 10 mg daily.   Orders:    Lipid Panel with Direct LDL reflex    Encounter for screening mammogram for breast cancer  Due for mammogram.   Orders:    Mammo screening bilateral w 3d and cad    Screening for colorectal cancer  Due for colonoscopy.   Orders:    Ambulatory Referral to Gastroenterology    Screening for thyroid disorder    Orders:    TSH, 3rd generation with Free T4 reflex    Phantom limb syndrome with pain (HCC)  Currently stable.        Type 2 diabetes mellitus with other skin complications (HCC)  Due for A1c.   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        Ankylosing spondylitis, unspecified site of spine (HCC)  She is receiving infusions every 6 weeks with rheumatology. Last one was 12/27/24.        Sicca, unspecified type (HCC)  She follows with rheumatology.        Pseudopolyposis of colon without complication, unspecified part of colon (HCC)  Due for colonoscopy.        Hypercoagulable state (HCC)  Currently on xarelto 20 mg daily.        Obesity, morbid (HCC)  Encouraged weight loss through diet and exercise.       Exertional angina (HCC)  She denies any exertional angina at this time.       Postmenopausal  Due for DEXA scan, but she defers at this time.        Anxiety    Orders:    LORazepam (ATIVAN) 0.5 mg tablet; Take 1 tablet (0.5 mg total) by mouth daily at bedtime      Depression Screening and Follow-up Plan:   Patient with underlying depression and was advised to continue current medications as prescribed.     Preventive health issues were discussed with patient, and age appropriate screening tests were ordered as noted in patient's After Visit Summary. Personalized health  advice and appropriate referrals for health education or preventive services given if needed, as noted in patient's After Visit Summary.    History of Present Illness     Patient is a 55 yo female that presents today for her annual medicare wellness visit. She has no new complaints today.    Health Maintenance:  Due for DM eye exam, DM foot exam, colonoscopy, labs, and mammogram.  Family history of HTN, CAD, breast cancer, DM.  Immunizations: due for COVID and flu vaccines.        Patient Care Team:  Ottoniel Nicole MD as PCP - General (Internal Medicine)  MD Jorge Georges III, MD as Endoscopist  Ruth Samson PA-C (Gastroenterology)  Charlene Ridley PA-C as Physician Assistant (Gastroenterology)    Review of Systems   Constitutional:  Negative for chills, fatigue and fever.   HENT:  Negative for ear discharge, ear pain, postnasal drip, rhinorrhea, sinus pressure, sinus pain, sore throat, tinnitus and trouble swallowing.    Eyes:  Negative for pain, discharge and itching.   Respiratory:  Negative for cough, shortness of breath and wheezing.    Cardiovascular:  Negative for chest pain, palpitations and leg swelling.   Gastrointestinal:  Negative for abdominal pain, constipation, diarrhea, nausea and vomiting.   Endocrine: Negative for polydipsia, polyphagia and polyuria.   Genitourinary:  Negative for difficulty urinating, frequency, hematuria and urgency.   Musculoskeletal:  Negative for arthralgias, joint swelling and myalgias.   Skin:  Negative for color change.   Allergic/Immunologic: Negative for environmental allergies.   Neurological:  Negative for dizziness, weakness, light-headedness, numbness and headaches.   Hematological:  Negative for adenopathy.   Psychiatric/Behavioral:  Positive for sleep disturbance. Negative for decreased concentration. The patient is nervous/anxious.      Medical History Reviewed by provider this encounter:  Tobacco  Allergies  Meds  Problems  Med Hx   Surg Hx  Fam Hx       Annual Wellness Visit Questionnaire   Rimma is here for her Subsequent Wellness visit. Last Medicare Wellness visit information reviewed, patient interviewed and updates made to the record today.      Health Risk Assessment:   Patient rates overall health as fair. Patient feels that their physical health rating is slightly worse. Patient is dissatisfied with their life. Eyesight was rated as same. Hearing was rated as same. Patient feels that their emotional and mental health rating is same. Patients states they are never, rarely angry. Patient states they are never, rarely unusually tired/fatigued. Pain experienced in the last 7 days has been a lot. Patient's pain rating has been 6/10. Patient states that she has experienced no weight loss or gain in last 6 months.     Depression Screening:   PHQ-9 Score: 16      Fall Risk Screening:   In the past year, patient has experienced: no history of falling in past year      Urinary Incontinence Screening:   Patient has not leaked urine accidently in the last six months.     Home Safety:  Patient has trouble with stairs inside or outside of their home. Patient has working smoke alarms and has working carbon monoxide detector. Home safety hazards include: none.     Nutrition:   Current diet is Regular.     Medications:   Patient is not currently taking any over-the-counter supplements. Patient is not able to manage medications.     Activities of Daily Living (ADLs)/Instrumental Activities of Daily Living (IADLs):   Walk and transfer into and out of bed and chair?: Yes  Dress and groom yourself?: Yes    Bathe or shower yourself?: Yes    Feed yourself? Yes  Do your laundry/housekeeping?: Yes  Manage your money, pay your bills and track your expenses?: Yes  Make your own meals?: Yes    Do your own shopping?: Yes    Previous Hospitalizations:   Any hospitalizations or ED visits within the last 12 months?: No      Advance Care Planning:   Living will: Yes     Durable POA for healthcare: Yes    Advanced directive: Yes    Advanced directive counseling given: Yes      PREVENTIVE SCREENINGS      Cardiovascular Screening:    General: History Lipid Disorder and Risks and Benefits Discussed    Due for: Lipid Panel      Diabetes Screening:     General: History Diabetes and Risks and Benefits Discussed    Due for: Blood Glucose      Colorectal Cancer Screening:     General: Screening Current    Due for: Colonoscopy - Low Risk      Breast Cancer Screening:     General: Risks and Benefits Discussed    Due for: Mammogram        Cervical Cancer Screening:    General: Screening Not Indicated      Osteoporosis Screening:    General: Risks and Benefits Discussed    Due for: Bone Density Ultrasound      Abdominal Aortic Aneurysm (AAA) Screening:        General: Screening Not Indicated      Lung Cancer Screening:     General: Screening Not Indicated      Hepatitis C Screening:    General: Screening Current    Screening, Brief Intervention, and Referral to Treatment (SBIRT)    Screening  Typical number of drinks in a day: 0  Typical number of drinks in a week: 0  Interpretation: Low risk drinking behavior.    Single Item Drug Screening:  How often have you used an illegal drug (including marijuana) or a prescription medication for non-medical reasons in the past year? never    Single Item Drug Screen Score: 0  Interpretation: Negative screen for possible drug use disorder    Social Drivers of Health     Financial Resource Strain: Medium Risk (8/23/2023)    Overall Financial Resource Strain (CARDIA)     Difficulty of Paying Living Expenses: Somewhat hard   Food Insecurity: No Food Insecurity (1/9/2025)    Hunger Vital Sign     Worried About Running Out of Food in the Last Year: Never true     Ran Out of Food in the Last Year: Never true   Transportation Needs: No Transportation Needs (1/9/2025)    PRAPARE - Transportation     Lack of Transportation (Medical): No     Lack of  "Transportation (Non-Medical): No   Housing Stability: Unknown (1/9/2025)    Housing Stability Vital Sign     Unable to Pay for Housing in the Last Year: No     Homeless in the Last Year: No   Utilities: Not At Risk (1/9/2025)    [facility] Utilities     Threatened with loss of utilities: No     No results found.    Objective   /84   Pulse 88   Ht 5' 3\" (1.6 m)   Wt 96.6 kg (213 lb)   SpO2 100%   BMI 37.73 kg/m²     Physical Exam  Vitals and nursing note reviewed.   Constitutional:       General: She is awake. She is not in acute distress.     Appearance: Normal appearance. She is well-developed and well-groomed. She is obese.   HENT:      Head: Normocephalic and atraumatic.      Right Ear: Hearing and external ear normal.      Left Ear: Hearing and external ear normal.      Nose: Nose normal.      Mouth/Throat:      Lips: Pink.      Mouth: Mucous membranes are moist.   Eyes:      General: Lids are normal. Vision grossly intact. Gaze aligned appropriately.      Conjunctiva/sclera: Conjunctivae normal.   Neck:      Vascular: No carotid bruit.      Trachea: Trachea and phonation normal.   Cardiovascular:      Rate and Rhythm: Normal rate and regular rhythm.      Heart sounds: Normal heart sounds, S1 normal and S2 normal. No murmur heard.     No friction rub. No gallop.   Pulmonary:      Effort: Pulmonary effort is normal. No respiratory distress.      Breath sounds: Normal breath sounds and air entry. No decreased breath sounds, wheezing, rhonchi or rales.   Abdominal:      General: Abdomen is protuberant.   Musculoskeletal:         General: No swelling.      Cervical back: Neck supple.      Right lower leg: No edema.      Left lower leg: No edema.   Skin:     General: Skin is warm.      Capillary Refill: Capillary refill takes less than 2 seconds.   Neurological:      Mental Status: She is alert.   Psychiatric:         Attention and Perception: Attention and perception normal.         Mood and Affect: Mood and " affect normal.         Speech: Speech normal.         Behavior: Behavior normal. Behavior is cooperative.         Thought Content: Thought content normal.         Cognition and Memory: Cognition and memory normal.         Judgment: Judgment normal.

## 2025-01-09 NOTE — ASSESSMENT & PLAN NOTE
Due for lipid panel. Currently on rosuvastatin 10 mg daily.   Orders:    Lipid Panel with Direct LDL reflex

## 2025-01-09 NOTE — ASSESSMENT & PLAN NOTE
Depression Screening Follow-up Plan: Patient's depression screening was positive with a PHQ-9 score of 16. Patient with underlying depression and was advised to continue current medications as prescribed.She has had an increase in anxiety and depression due to her  being chronically ill. She is on wellbutrin 300 mg daily. Recommended adding ativan 0.5 mg prn for anxiety. Reviewed potential adverse effects.           no

## 2025-01-09 NOTE — ASSESSMENT & PLAN NOTE
Due for A1c. No home sugars. She is not currently on medication. Reviewed a low sugar and carb diet.   Lab Results   Component Value Date    HGBA1C 6.0 (H) 02/07/2023       Orders:    Hemoglobin A1C    Comprehensive metabolic panel    Albumin / creatinine urine ratio

## 2025-01-09 NOTE — PROGRESS NOTES
Patient's shoes and socks removed.    Right Foot/Ankle   Right Foot Inspection  Skin Exam: skin normal and skin intact. No dry skin, no warmth, no callus, no erythema, no maceration, no abnormal color, no pre-ulcer, no ulcer and no callus.     Toe Exam: ROM and strength within normal limits.     Sensory   Monofilament testing: intact    Vascular  The right DP pulse is 1+. The right PT pulse is 1+.     Right Toe  - Comprehensive Exam  Ecchymosis: none  Swelling: none       Left Foot/Ankle  Left Foot Inspection  Skin Exam: skin normal and skin intact. No dry skin, no warmth, no erythema, no maceration, normal color, no pre-ulcer, no ulcer and no callus.     Toe Exam: ROM and strength within normal limits.     Sensory   Monofilament testing: intact    Vascular  The left DP pulse is 1+. The left PT pulse is 1+.     Left Toe  - Comprehensive Exam  Ecchymosis: none  Swelling: none       Assign Risk Category  No deformity present  No loss of protective sensation  No weak pulses  Risk: 0

## 2025-01-09 NOTE — ASSESSMENT & PLAN NOTE
Due for CBC and iron panel.   Orders:    CBC and differential    Iron Panel (Includes Ferritin, Iron Sat%, Iron, and TIBC); Future

## 2025-01-09 NOTE — ASSESSMENT & PLAN NOTE
She has had 2 DVT's. The first was provoked after her hysterectomy around 2009. She had a second DVT shortly after. She is currently on xarelto 20 mg daily lifelong.

## 2025-04-06 DIAGNOSIS — F32.A DEPRESSION, UNSPECIFIED DEPRESSION TYPE: ICD-10-CM

## 2025-04-07 RX ORDER — BUPROPION HYDROCHLORIDE 300 MG/1
300 TABLET ORAL EVERY MORNING
Qty: 90 TABLET | Refills: 1 | Status: SHIPPED | OUTPATIENT
Start: 2025-04-07

## 2025-04-07 RX ORDER — SERTRALINE HYDROCHLORIDE 100 MG/1
100 TABLET, FILM COATED ORAL DAILY
Qty: 90 TABLET | Refills: 0 | OUTPATIENT
Start: 2025-04-07

## 2025-06-24 DIAGNOSIS — O22.30 DVT (DEEP VEIN THROMBOSIS) IN PREGNANCY: ICD-10-CM

## 2025-06-24 DIAGNOSIS — K21.9 GASTROESOPHAGEAL REFLUX DISEASE WITHOUT ESOPHAGITIS: ICD-10-CM

## 2025-06-24 NOTE — TELEPHONE ENCOUNTER
Medication: Omeprazole (Prilosec)     Dose/Frequency: 40 mg / TAKE 1 CAPSULE (40 MG TOTAL) BY MOUTH DAILY.     Quantity: 90    Pharmacy: [facility]    Office:   [x] PCP/Provider - Ottoniel Nicole MD  [] Speciality/Provider -     Does the patient have enough for 3 days?   [] Yes   [] No - Send as HP to POD       Medication: rivaroxaban (Xarelto)     Dose/Frequency: 20 mg tablet / TAKE 1 TABLET BY MOUTH EVERY DAY     Quantity: 90 (requesting) Rx was 30    Pharmacy: [facility]     Office:   [x] PCP/Provider - Ottoniel Nicole MD  [] Speciality/Provider -     Does the patient have enough for 3 days?   [] Yes   [] No - Send as HP to POD

## 2025-06-25 RX ORDER — OMEPRAZOLE 40 MG/1
40 CAPSULE, DELAYED RELEASE ORAL DAILY
Qty: 90 CAPSULE | Refills: 1 | Status: SHIPPED | OUTPATIENT
Start: 2025-06-25

## 2025-06-25 RX ORDER — RIVAROXABAN 20 MG/1
20 TABLET, FILM COATED ORAL DAILY
Qty: 180 TABLET | Refills: 1 | Status: SHIPPED | OUTPATIENT
Start: 2025-06-25

## 2025-07-02 ENCOUNTER — APPOINTMENT (OUTPATIENT)
Dept: LAB | Facility: CLINIC | Age: 55
End: 2025-07-02
Payer: MEDICARE

## 2025-07-02 DIAGNOSIS — D50.0 IRON DEFICIENCY ANEMIA DUE TO CHRONIC BLOOD LOSS: ICD-10-CM

## 2025-07-02 PROBLEM — L98.9 UNKNOWN SKIN LESION: Status: RESOLVED | Noted: 2018-10-31 | Resolved: 2025-07-02

## 2025-07-02 PROBLEM — E11.628 TYPE 2 DIABETES MELLITUS WITH OTHER SKIN COMPLICATIONS (HCC): Status: RESOLVED | Noted: 2025-01-09 | Resolved: 2025-07-02

## 2025-07-02 LAB
CHOLEST SERPL-MCNC: 286 MG/DL (ref ?–200)
CREAT UR-MCNC: 99.8 MG/DL
EST. AVERAGE GLUCOSE BLD GHB EST-MCNC: 137 MG/DL
FERRITIN SERPL-MCNC: 7 NG/ML (ref 30–307)
HBA1C MFR BLD: 6.4 %
HDLC SERPL-MCNC: 56 MG/DL
IRON SATN MFR SERPL: 13 % (ref 15–50)
IRON SERPL-MCNC: 53 UG/DL (ref 50–212)
LDLC SERPL CALC-MCNC: 191 MG/DL (ref 0–100)
MICROALBUMIN UR-MCNC: <7 MG/L
TIBC SERPL-MCNC: 414.4 UG/DL (ref 250–450)
TRANSFERRIN SERPL-MCNC: 296 MG/DL (ref 203–362)
TRIGL SERPL-MCNC: 194 MG/DL (ref ?–150)
TSH SERPL DL<=0.05 MIU/L-ACNC: 0.45 UIU/ML (ref 0.45–4.5)
UIBC SERPL-MCNC: 361 UG/DL (ref 155–355)

## 2025-07-02 PROCEDURE — 82728 ASSAY OF FERRITIN: CPT

## 2025-07-02 PROCEDURE — 83550 IRON BINDING TEST: CPT

## 2025-07-02 PROCEDURE — 83540 ASSAY OF IRON: CPT

## 2025-07-03 ENCOUNTER — OFFICE VISIT (OUTPATIENT)
Age: 55
End: 2025-07-03
Payer: MEDICARE

## 2025-07-03 VITALS
HEART RATE: 82 BPM | HEIGHT: 63 IN | BODY MASS INDEX: 36.86 KG/M2 | DIASTOLIC BLOOD PRESSURE: 82 MMHG | SYSTOLIC BLOOD PRESSURE: 134 MMHG | OXYGEN SATURATION: 97 % | WEIGHT: 208 LBS

## 2025-07-03 DIAGNOSIS — Z12.12 SCREENING FOR COLORECTAL CANCER: ICD-10-CM

## 2025-07-03 DIAGNOSIS — M45.9 ANKYLOSING SPONDYLITIS, UNSPECIFIED SITE OF SPINE (HCC): ICD-10-CM

## 2025-07-03 DIAGNOSIS — F33.9 EPISODE OF RECURRENT MAJOR DEPRESSIVE DISORDER, UNSPECIFIED DEPRESSION EPISODE SEVERITY (HCC): ICD-10-CM

## 2025-07-03 DIAGNOSIS — Z12.11 SCREENING FOR COLORECTAL CANCER: ICD-10-CM

## 2025-07-03 DIAGNOSIS — E66.01 OBESITY, MORBID (HCC): ICD-10-CM

## 2025-07-03 DIAGNOSIS — Z12.31 ENCOUNTER FOR SCREENING MAMMOGRAM FOR BREAST CANCER: ICD-10-CM

## 2025-07-03 DIAGNOSIS — I20.89 EXERTIONAL ANGINA (HCC): Primary | ICD-10-CM

## 2025-07-03 DIAGNOSIS — D50.0 IRON DEFICIENCY ANEMIA DUE TO CHRONIC BLOOD LOSS: ICD-10-CM

## 2025-07-03 DIAGNOSIS — E78.2 MIXED HYPERLIPIDEMIA: ICD-10-CM

## 2025-07-03 DIAGNOSIS — E11.9 TYPE 2 DIABETES MELLITUS IN REMISSION (HCC): ICD-10-CM

## 2025-07-03 DIAGNOSIS — K51.40 PSEUDOPOLYPOSIS OF COLON WITHOUT COMPLICATION, UNSPECIFIED PART OF COLON (HCC): ICD-10-CM

## 2025-07-03 DIAGNOSIS — E78.49 OTHER HYPERLIPIDEMIA: ICD-10-CM

## 2025-07-03 DIAGNOSIS — F41.9 ANXIETY: ICD-10-CM

## 2025-07-03 DIAGNOSIS — I26.99 OTHER PULMONARY EMBOLISM WITHOUT ACUTE COR PULMONALE, UNSPECIFIED CHRONICITY (HCC): ICD-10-CM

## 2025-07-03 DIAGNOSIS — S88.919A AMPUTATION OF LEG (HCC): ICD-10-CM

## 2025-07-03 DIAGNOSIS — R53.83 OTHER FATIGUE: ICD-10-CM

## 2025-07-03 PROCEDURE — G2211 COMPLEX E/M VISIT ADD ON: HCPCS

## 2025-07-03 PROCEDURE — 99214 OFFICE O/P EST MOD 30 MIN: CPT

## 2025-07-03 RX ORDER — LORAZEPAM 0.5 MG/1
0.5 TABLET ORAL
Qty: 30 TABLET | Refills: 0 | Status: SHIPPED | OUTPATIENT
Start: 2025-07-03

## 2025-07-03 RX ORDER — ROSUVASTATIN CALCIUM 20 MG/1
20 TABLET, COATED ORAL DAILY
Qty: 90 TABLET | Refills: 3 | Status: SHIPPED | OUTPATIENT
Start: 2025-07-03

## 2025-07-03 NOTE — ASSESSMENT & PLAN NOTE
Orders:    Lipid Panel with Direct LDL reflex; Future    rosuvastatin (CRESTOR) 20 MG tablet; Take 1 tablet (20 mg total) by mouth daily    
A1c 6.4. No home sugars. Not currently on medication at this time. Recommended low sugar and carb diet.   Lab Results   Component Value Date    HGBA1C 6.4 (H) 07/02/2025       Orders:    Hemoglobin A1C; Future    Comprehensive metabolic panel; Future    Albumin / creatinine urine ratio; Future    
Continue Remicade infusions.  CBC, CMP, ESR, and CRP are monitored by rheumatology.  She was recently treated with a short course of Decadron as bridge therapy.  Follow-up as scheduled for 2 months.       
Due for colonoscopy.       
Encouraged weight loss through diet and exericse.          
Ferritin is low at 7. Hx of MERCEDES since at least 2019. Recommended restarting iron supplement qod with food. Reviewed potential adverse effects. No hx of gastric bypass. She is s/p bilateral salping oophorectomy. Recent colonoscopy was stable in 2022. Recommended referral to GI at this time for EGD and colonoscopy.   Orders:    Ambulatory Referral to Gastroenterology; Future    Iron Panel (Includes Ferritin, Iron Sat%, Iron, and TIBC); Future    
LDL is 191. Recommended increasing rosuvastatin to 20 mg daily.  Continue low-cholesterol diet.       
She admits to an increase in anxiety and depression due to current life stressors.  Continue Wellbutrin 300 mg and Ativan as needed.  Recommended establishing with a therapist at this time, but she declines.  Reviewed healthy coping mechanisms.       
She denies any CP or CP on exertion.        
She has had 2 DVT's. The first was provoked after her hysterectomy around 2009. She had a second DVT shortly after. She is currently on xarelto 20 mg daily lifelong.        
None

## 2025-07-03 NOTE — PROGRESS NOTES
Name: Rimma Garcia      : 1970      MRN: [id number]  Encounter Provider: Tari Tovar PA-C  Encounter Date: 7/3/2025   [facility]  :  Assessment & Plan  Exertional angina (HCC)  She denies any CP or CP on exertion.        Type 2 diabetes mellitus in remission (HCC)  A1c 6.4. No home sugars. Not currently on medication at this time. Recommended low sugar and carb diet.   Lab Results   Component Value Date    HGBA1C 6.4 (H) 2025       Orders:    Hemoglobin A1C; Future    Comprehensive metabolic panel; Future    Albumin / creatinine urine ratio; Future    Other pulmonary embolism without acute cor pulmonale, unspecified chronicity (HCC)  She has had 2 DVT's. The first was provoked after her hysterectomy around . She had a second DVT shortly after. She is currently on xarelto 20 mg daily lifelong.        Ankylosing spondylitis, unspecified site of spine (HCC)  Continue Remicade infusions.  CBC, CMP, ESR, and CRP are monitored by rheumatology.  She was recently treated with a short course of Decadron as bridge therapy.  Follow-up as scheduled for 2 months.       Iron deficiency anemia due to chronic blood loss  Ferritin is low at 7. Hx of MERCEDES since at least . Recommended restarting iron supplement qod with food. Reviewed potential adverse effects. No hx of gastric bypass. She is s/p bilateral salping oophorectomy. Recent colonoscopy was stable in . Recommended referral to GI at this time for EGD and colonoscopy.   Orders:    Ambulatory Referral to Gastroenterology; Future    Iron Panel (Includes Ferritin, Iron Sat%, Iron, and TIBC); Future    Episode of recurrent major depressive disorder, unspecified depression episode severity (HCC)  She admits to an increase in anxiety and depression due to current life stressors.  Continue Wellbutrin 300 mg and Ativan as needed.  Recommended establishing with a therapist at this time, but  she declines.  Reviewed healthy coping mechanisms.       Amputation of leg (HCC)         Mixed hyperlipidemia  LDL is 191. Recommended increasing rosuvastatin to 20 mg daily.  Continue low-cholesterol diet.       Obesity, morbid (HCC)  Encouraged weight loss through diet and exericse.          Encounter for screening mammogram for breast cancer  Due for mammogram.  Orders:    Mammo screening bilateral w 3d and cad; Future    Pseudopolyposis of colon without complication, unspecified part of colon (HCC)  Due for colonoscopy.       Screening for colorectal cancer    Orders:    Ambulatory Referral to Gastroenterology; Future    Other hyperlipidemia    Orders:    Lipid Panel with Direct LDL reflex; Future    rosuvastatin (CRESTOR) 20 MG tablet; Take 1 tablet (20 mg total) by mouth daily    Other fatigue    Orders:    CBC and differential; Future    TSH, 3rd generation with Free T4 reflex; Future    Anxiety    Orders:    LORazepam (ATIVAN) 0.5 mg tablet; Take 1 tablet (0.5 mg total) by mouth daily at bedtime          Depression Screening and Follow-up Plan: Patient's depression screening was positive with a PHQ-9 score of 15.   Patient with underlying depression and was advised to continue current medications as prescribed.       History of Present Illness   Patient is a 54-year-old female that presents today for 6-month follow-up.  She complains of an increase in stress.     Recommended eating a well-balanced diet of fruits, veggies, and lean meats and increasing water intake. 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 She sleeps okay. She denies any alcohol, tobacco, or illicit drug use. She is UTD with dentist and eye doctor. She is  to her , Jose. She has 3 kids and 3 grandkids.     Health Maintenance:  Due for colonoscopy, DM eye exam, mammogram.  Immunizations: shingles and PCV.      Review of Systems   Constitutional:  Negative for chills, fatigue and fever.   HENT:  Negative for ear  "discharge, ear pain, postnasal drip, rhinorrhea, sinus pressure, sinus pain, sore throat, tinnitus and trouble swallowing.    Eyes:  Negative for pain, discharge and itching.   Respiratory:  Negative for cough, shortness of breath and wheezing.    Cardiovascular:  Negative for chest pain, palpitations and leg swelling.   Gastrointestinal:  Negative for abdominal pain, constipation, diarrhea, nausea and vomiting.   Endocrine: Negative for polydipsia, polyphagia and polyuria.   Genitourinary:  Negative for difficulty urinating, frequency, hematuria and urgency.   Musculoskeletal:  Negative for arthralgias, joint swelling and myalgias.   Skin:  Negative for color change.   Allergic/Immunologic: Negative for environmental allergies.   Neurological:  Negative for dizziness, weakness, light-headedness, numbness and headaches.   Hematological:  Negative for adenopathy.   Psychiatric/Behavioral:  Positive for sleep disturbance. Negative for decreased concentration. The patient is nervous/anxious.        Objective   Pulse 82   Ht 5' 3\" (1.6 m)   Wt 94.3 kg (208 lb)   SpO2 97%   BMI 36.85 kg/m²      Physical Exam  Vitals and nursing note reviewed.   Constitutional:       General: She is awake. She is not in acute distress.     Appearance: Normal appearance. She is well-developed and well-groomed. She is obese.   HENT:      Head: Normocephalic and atraumatic.      Right Ear: Hearing and external ear normal.      Left Ear: Hearing and external ear normal.      Nose: Nose normal.      Mouth/Throat:      Lips: Pink.      Mouth: Mucous membranes are moist.     Eyes:      General: Lids are normal. Vision grossly intact. Gaze aligned appropriately.      Conjunctiva/sclera: Conjunctivae normal.     Neck:      Vascular: No carotid bruit.      Trachea: Trachea and phonation normal.     Cardiovascular:      Rate and Rhythm: Normal rate and regular rhythm.      Heart sounds: Normal heart sounds, S1 normal and S2 normal. No murmur " heard.     No friction rub. No gallop.   Pulmonary:      Effort: Pulmonary effort is normal. No respiratory distress.      Breath sounds: Normal breath sounds and air entry. No decreased breath sounds, wheezing, rhonchi or rales.   Abdominal:      General: Abdomen is protuberant.     Musculoskeletal:         General: No swelling.      Cervical back: Neck supple.      Right lower leg: No edema.      Left lower leg: No edema.     Skin:     General: Skin is warm.      Capillary Refill: Capillary refill takes less than 2 seconds.     Neurological:      Mental Status: She is alert.     Psychiatric:         Attention and Perception: Attention and perception normal.         Mood and Affect: Mood and affect normal.         Speech: Speech normal.         Behavior: Behavior normal. Behavior is cooperative.         Thought Content: Thought content normal.         Cognition and Memory: Cognition and memory normal.         Judgment: Judgment normal.

## 2025-07-03 NOTE — PATIENT INSTRUCTIONS
"Patient Education     Low blood sugar in people with diabetes   The Basics   Written by the doctors and editors at [facility]   What is low blood sugar? -- This is when the level of sugar in a person's blood gets too low. It is also called \"hypoglycemia.\"  Low blood sugar can cause symptoms ranging from sweating and feeling hungry to passing out.  Low blood sugar can happen in people with diabetes who take certain medicines, including insulin, other medicines given as shots, and some types of pills.  When can people with diabetes get low blood sugar? -- People with diabetes can get low blood sugar when they:   Take too much medicine, including insulin, other medicines given as shots, or certain diabetes pills   Do not eat enough food   Exercise too much without eating a snack or reducing their insulin dose   Wait too long between meals   Drink too much alcohol or drink alcohol on an empty stomach  What are the symptoms of low blood sugar? -- The symptoms can be different from person to person, and can change over time. During the early stages of low blood sugar, a person can:   Sweat or tremble   Feel hungry   Feel worried  People who have early symptoms should check their blood sugar level to see if it is low and needs to be treated. If low blood sugar levels are not treated, severe symptoms can occur. These can include:   Trouble walking or feeling weak   Trouble seeing clearly   Being confused or acting in a strange way   Passing out or having a seizure  Some people do not get symptoms during the early stages of low blood sugar. Doctors sometimes call this \"hypoglycemia unawareness.\" People with hypoglycemia unawareness are more likely to have severe symptoms, because they might not know that they have low blood sugar until they have severe symptoms. Hypoglycemia unawareness is more likely in people who:   Have had type 1 diabetes for more than 5 to 10 years   Have frequent episodes of low blood sugar   Use insulin " to keep their blood sugar level tightly managed   Are tired   Drink a lot of alcohol   Take certain medicines for high blood pressure or diabetes  How is low blood sugar treated? -- It can be treated with:   Quick sources of sugar - People can eat or drink quick sources of sugar (table 1). Foods that have fat, such as chocolate or cheese, do not treat low blood sugar as quickly. You and a family member should carry a quick source of sugar at all times.   A dose of glucagon - Glucagon is a hormone that can quickly raise blood sugar levels and stop severe symptoms. It comes as a shot (figure 1) or a nose spray. If your doctor recommends that you carry glucagon with you, they will tell you when and how to use it. If possible, it's also a good idea to have a family member, friend, or roommate learn how to give you glucagon. That way, they can give it to you if you can't do it yourself.  After treating low blood sugar, it is very important to recheck your blood sugar level to make sure that it rises and stays in the normal range. Once your blood sugar is normal, eat a small snack that contains protein, fat, and carbohydrate. This can help keep your blood sugar stable.  What should I do after treatment? -- After treatment for low blood sugar, most people can get back to their usual routine. But your doctor or nurse might recommend that you check your blood sugar level more often during the next 2 to 3 days.  If your low blood sugar was treated with glucagon, call your doctor or nurse. They might change the dose of your diabetes medicine.  How can I prevent low blood sugar? -- The best way is to:   Check your blood sugar levels often - Your doctor or nurse will tell you how and when to check your blood sugar levels at home. They will also tell you what your blood sugar levels should be, and when to treat low blood sugar.   Learn the symptoms of low blood sugar, and be ready to treat it in the early stages. Treating low  "blood sugar early can prevent severe symptoms.  When should I go to a hospital or call for an ambulance? -- A family member or friend should take you to a hospital or call for an ambulance (in the [place] and [place], call 9-1-1) if you:   Are still confused 15 minutes after being treated with a dose of glucagon   Have passed out, and there is no glucagon nearby   Still have low blood sugar after treatment  If you have low blood sugar, do not try to drive yourself to the hospital. Driving with low blood sugar can be dangerous.  All topics are updated as new evidence becomes available and our peer review process is complete.  This topic retrieved from [employer] on: Apr 11, 2024.  Topic 23189 Version 22.0  Release: 32.3.2 - C32.100  © 2024 UpToDate, Inc. and/or its affiliates. All rights reserved.  table 1: Quick sources of sugar to treat low blood sugar  3 or 4 glucose tablets   ½ cup of juice or regular soda (not sugar-free)   2 tablespoons of raisins   4 or 5 saltine crackers   1 tablespoon of sugar   1 tablespoon of honey or corn syrup   6 to 8 hard candies   These sources of sugar act quickly to treat low blood sugar levels. People with diabetes who use insulin or certain other diabetes medicines should carry at least 1 of these items at all times.  Graphic 38500 Version 4.0  figure 1: Glucagon autoinjector     Some people carry glucagon in the form of an autoinjector \"pen.\" This makes it easy to give a dose into the upper arm, thigh, or belly.  Graphic 680862 Version 2.0  Consumer Information Use and Disclaimer   Disclaimer: This generalized information is a limited summary of diagnosis, treatment, and/or medication information. It is not meant to be comprehensive and should be used as a tool to help the user understand and/or assess potential diagnostic and treatment options. It does NOT include all information about conditions, treatments, medications, side effects, or risks that may apply to a specific patient. It " is not intended to be medical advice or a substitute for the medical advice, diagnosis, or treatment of a health care provider based on the health care provider's examination and assessment of a patient's specific and unique circumstances. Patients must speak with a health care provider for complete information about their health, medical questions, and treatment options, including any risks or benefits regarding use of medications. This information does not endorse any treatments or medications as safe, effective, or approved for treating a specific patient. Up[employer], Inc[employer] and its affiliates disclaim any warranty or liability relating to this information or the use thereof.The use of this information is governed by the Terms of Use, available at https://www.woltersTRAKLOKuwer.com/en/know/clinical-effectiveness-terms. 2024© UpToDate, Inc. and its affiliates and/or licensors. All rights reserved.  Copyright   © 2024 Up[employer]Date, Inc[employer] and/or its affiliates. All rights reserved.
